# Patient Record
Sex: FEMALE | Race: WHITE | Employment: FULL TIME | URBAN - METROPOLITAN AREA
[De-identification: names, ages, dates, MRNs, and addresses within clinical notes are randomized per-mention and may not be internally consistent; named-entity substitution may affect disease eponyms.]

---

## 2017-03-20 ENCOUNTER — TELEPHONE (OUTPATIENT)
Dept: OBGYN CLINIC | Age: 37
End: 2017-03-20

## 2017-03-21 RX ORDER — VITAMIN A ACETATE, .BETA.-CAROTENE, ASCORBIC ACID, CHOLECALCIFEROL, .ALPHA.-TOCOPHEROL ACETATE, DL-, THIAMINE MONONITRATE, RIBOFLAVIN, NIACINAMIDE, PYRIDOXINE HYDROCHLORIDE, FOLIC ACID, CYANOCOBALAMIN, CALCIUM CARBONATE, FERROUS FUMARATE, ZINC OXIDE, AND CUPRIC OXIDE 2000; 2000; 120; 400; 22; 1.84; 3; 20; 10; 1; 12; 200; 27; 25; 2 [IU]/1; [IU]/1; MG/1; [IU]/1; MG/1; MG/1; MG/1; MG/1; MG/1; MG/1; UG/1; MG/1; MG/1; MG/1; MG/1
1 TABLET ORAL DAILY
Qty: 30 TABLET | Refills: 5 | Status: SHIPPED | OUTPATIENT
Start: 2017-03-21 | End: 2017-09-18 | Stop reason: SDUPTHER

## 2017-03-28 ENCOUNTER — OFFICE VISIT (OUTPATIENT)
Dept: OBGYN CLINIC | Age: 37
End: 2017-03-28
Payer: MEDICARE

## 2017-03-28 ENCOUNTER — HOSPITAL ENCOUNTER (OUTPATIENT)
Age: 37
Setting detail: SPECIMEN
Discharge: HOME | End: 2017-03-28
Payer: MEDICARE

## 2017-03-28 VITALS
BODY MASS INDEX: 41.12 KG/M2 | DIASTOLIC BLOOD PRESSURE: 70 MMHG | HEIGHT: 68 IN | WEIGHT: 271.3 LBS | SYSTOLIC BLOOD PRESSURE: 130 MMHG

## 2017-03-28 DIAGNOSIS — O09.529 AMA (ADVANCED MATERNAL AGE) MULTIGRAVIDA 35+, UNSPECIFIED TRIMESTER: ICD-10-CM

## 2017-03-28 DIAGNOSIS — N91.2 AMENORRHEA: ICD-10-CM

## 2017-03-28 DIAGNOSIS — Z01.419 VISIT FOR GYNECOLOGIC EXAMINATION: ICD-10-CM

## 2017-03-28 DIAGNOSIS — Z32.01 POSITIVE URINE PREGNANCY TEST: ICD-10-CM

## 2017-03-28 DIAGNOSIS — N91.2 AMENORRHEA: Primary | ICD-10-CM

## 2017-03-28 LAB
-: ABNORMAL
ABO/RH: NORMAL
ABSOLUTE EOS #: 0.2 K/UL (ref 0–0.4)
ABSOLUTE LYMPH #: 1.8 K/UL (ref 1–4.8)
ABSOLUTE MONO #: 0.7 K/UL (ref 0.1–1.2)
AMORPHOUS: ABNORMAL
ANTIBODY SCREEN: NEGATIVE
BACTERIA: ABNORMAL
BASOPHILS # BLD: 1 % (ref 0–2)
BASOPHILS ABSOLUTE: 0 K/UL (ref 0–0.2)
BILIRUBIN URINE: NEGATIVE
CASTS UA: ABNORMAL /LPF (ref 0–2)
CHLAMYDIA CULTURE: POSITIVE
COLOR: ABNORMAL
COMMENT UA: ABNORMAL
CONTROL: PRESENT
CRYSTALS, UA: ABNORMAL /HPF
CULTURE, GONORRHOEAE: NEGATIVE
DIFFERENTIAL TYPE: ABNORMAL
EOSINOPHILS RELATIVE PERCENT: 2 % (ref 1–4)
EPITHELIAL CELLS UA: ABNORMAL /HPF (ref 0–5)
GLUCOSE URINE: NEGATIVE
HCT VFR BLD CALC: 39.2 % (ref 36–46)
HEMOGLOBIN: 13.5 G/DL (ref 12–16)
HEPATITIS B SURFACE ANTIGEN: NONREACTIVE
HIV AG/AB: NONREACTIVE
KETONES, URINE: ABNORMAL
LEUKOCYTE ESTERASE, URINE: ABNORMAL
LYMPHOCYTES # BLD: 21 % (ref 24–44)
MCH RBC QN AUTO: 29.1 PG (ref 26–34)
MCHC RBC AUTO-ENTMCNC: 34.4 G/DL (ref 31–37)
MCV RBC AUTO: 84.5 FL (ref 80–100)
MONOCYTES # BLD: 9 % (ref 2–11)
MUCUS: ABNORMAL
NITRITE, URINE: NEGATIVE
OTHER OBSERVATIONS UA: ABNORMAL
PDW BLD-RTO: 13.8 % (ref 12.5–15.4)
PH UA: 5 (ref 5–8)
PLATELET # BLD: 387 K/UL (ref 140–450)
PLATELET ESTIMATE: ABNORMAL
PMV BLD AUTO: 7.3 FL (ref 6–12)
PREGNANCY TEST URINE, POC: POSITIVE
PROTEIN UA: NEGATIVE
RBC # BLD: 4.64 M/UL (ref 4–5.2)
RBC # BLD: ABNORMAL 10*6/UL
RBC UA: ABNORMAL /HPF (ref 0–2)
RENAL EPITHELIAL, UA: ABNORMAL /HPF
RUBV IGG SER QL: 28.5 IU/ML
SEG NEUTROPHILS: 67 % (ref 36–66)
SEGMENTED NEUTROPHILS ABSOLUTE COUNT: 5.8 K/UL (ref 1.8–7.7)
SPECIFIC GRAVITY UA: 1.03 (ref 1–1.03)
T. PALLIDUM, IGG: NONREACTIVE
TRICHOMONAS: ABNORMAL
TURBIDITY: ABNORMAL
URINE HGB: NEGATIVE
UROBILINOGEN, URINE: NORMAL
WBC # BLD: 8.6 K/UL (ref 3.5–11)
WBC # BLD: ABNORMAL 10*3/UL
WBC UA: ABNORMAL /HPF (ref 0–5)
YEAST: ABNORMAL

## 2017-03-28 PROCEDURE — 99213 OFFICE O/P EST LOW 20 MIN: CPT | Performed by: NURSE PRACTITIONER

## 2017-03-28 PROCEDURE — 81025 URINE PREGNANCY TEST: CPT | Performed by: NURSE PRACTITIONER

## 2017-03-28 RX ORDER — OSELTAMIVIR PHOSPHATE 75 MG/1
CAPSULE ORAL
COMMUNITY
Start: 2017-01-15 | End: 2017-04-06

## 2017-03-28 ASSESSMENT — ENCOUNTER SYMPTOMS
COUGH: 0
RHINORRHEA: 0
CONSTIPATION: 0
WHEEZING: 0
SHORTNESS OF BREATH: 0
ABDOMINAL PAIN: 0
BACK PAIN: 0
DIARRHEA: 0

## 2017-03-29 ENCOUNTER — TELEPHONE (OUTPATIENT)
Dept: OBGYN CLINIC | Age: 37
End: 2017-03-29

## 2017-03-29 DIAGNOSIS — A74.9 CHLAMYDIA: Primary | ICD-10-CM

## 2017-03-29 LAB
C TRACH DNA GENITAL QL NAA+PROBE: ABNORMAL
CULTURE: NORMAL
CULTURE: NORMAL
Lab: NORMAL
N. GONORRHOEAE DNA: NEGATIVE
SPECIMEN DESCRIPTION: NORMAL
STATUS: NORMAL

## 2017-03-29 RX ORDER — AZITHROMYCIN 250 MG/1
TABLET, FILM COATED ORAL
Qty: 4 TABLET | Refills: 0 | Status: SHIPPED | OUTPATIENT
Start: 2017-03-29 | End: 2017-04-06

## 2017-03-30 ENCOUNTER — TELEPHONE (OUTPATIENT)
Dept: OBGYN CLINIC | Age: 37
End: 2017-03-30

## 2017-03-30 LAB
ALCOHOL URINE: NEGATIVE MG/DL
AMPHETAMINE SCREEN URINE: NEGATIVE NG/ML
BARBITURATE SCREEN URINE: NEGATIVE NG/ML
BENZODIAZEPINE SCREEN, URINE: NEGATIVE NG/ML
CANNABINOID SCREEN URINE: NEGATIVE NG/ML
COCAINE(METAB.)SCREEN, URINE: NEGATIVE NG/ML
CREATININE URINE: 246.3 MG/DL (ref 20–400)
CYTOLOGY REPORT: NORMAL
Lab: NORMAL
METHADONE SCREEN, URINE: NEGATIVE NG/ML
OPIATES, URINE: NEGATIVE NG/ML
PHENCYCLIDINE, URINE: NEGATIVE NG/ML
PROPOXYPHENE, URINE: NEGATIVE NG/ML

## 2017-04-06 ENCOUNTER — INITIAL PRENATAL (OUTPATIENT)
Dept: OBGYN CLINIC | Age: 37
End: 2017-04-06
Payer: MEDICARE

## 2017-04-06 ENCOUNTER — PROCEDURE VISIT (OUTPATIENT)
Dept: OBGYN CLINIC | Age: 37
End: 2017-04-06
Payer: MEDICARE

## 2017-04-06 VITALS — WEIGHT: 268 LBS | SYSTOLIC BLOOD PRESSURE: 102 MMHG | BODY MASS INDEX: 41.36 KG/M2 | DIASTOLIC BLOOD PRESSURE: 78 MMHG

## 2017-04-06 DIAGNOSIS — E28.2 PCOS (POLYCYSTIC OVARIAN SYNDROME): ICD-10-CM

## 2017-04-06 DIAGNOSIS — O10.019 BENIGN ESSENTIAL HYPERTENSION ANTEPARTUM: ICD-10-CM

## 2017-04-06 DIAGNOSIS — Z36.89 ENCOUNTER TO ESTABLISH GESTATIONAL AGE USING ULTRASOUND: ICD-10-CM

## 2017-04-06 DIAGNOSIS — O10.919 CHRONIC HYPERTENSION IN PREGNANCY: Primary | ICD-10-CM

## 2017-04-06 DIAGNOSIS — A74.9 POSITIVE CHLAMYDIA PCR: ICD-10-CM

## 2017-04-06 DIAGNOSIS — O09.529 AMA (ADVANCED MATERNAL AGE) MULTIGRAVIDA 35+, UNSPECIFIED TRIMESTER: ICD-10-CM

## 2017-04-06 DIAGNOSIS — Z3A.08 8 WEEKS GESTATION OF PREGNANCY: Primary | ICD-10-CM

## 2017-04-06 DIAGNOSIS — Z87.59 HISTORY OF ECTOPIC PREGNANCY: ICD-10-CM

## 2017-04-06 LAB
ABDOMINAL CIRCUMFERENCE: NORMAL CM
BIPARIETAL DIAMETER: NORMAL CM
ESTIMATED FETAL WEIGHT: NORMAL GRAMS
FEMORAL DIAMETER: NORMAL CM
HC/AC: NORMAL
HEAD CIRCUMFERENCE: NORMAL CM

## 2017-04-06 PROCEDURE — 99213 OFFICE O/P EST LOW 20 MIN: CPT | Performed by: NURSE PRACTITIONER

## 2017-04-06 PROCEDURE — 76801 OB US < 14 WKS SINGLE FETUS: CPT | Performed by: OBSTETRICS & GYNECOLOGY

## 2017-04-06 PROCEDURE — 76817 TRANSVAGINAL US OBSTETRIC: CPT | Performed by: OBSTETRICS & GYNECOLOGY

## 2017-05-04 ENCOUNTER — HOSPITAL ENCOUNTER (OUTPATIENT)
Age: 37
Setting detail: SPECIMEN
Discharge: HOME | End: 2017-05-04
Payer: MEDICARE

## 2017-05-04 ENCOUNTER — ROUTINE PRENATAL (OUTPATIENT)
Dept: OBGYN CLINIC | Age: 37
End: 2017-05-04
Payer: MEDICARE

## 2017-05-04 VITALS — SYSTOLIC BLOOD PRESSURE: 112 MMHG | DIASTOLIC BLOOD PRESSURE: 80 MMHG | BODY MASS INDEX: 41.17 KG/M2 | WEIGHT: 266.8 LBS

## 2017-05-04 DIAGNOSIS — L29.9 ITCHING: ICD-10-CM

## 2017-05-04 DIAGNOSIS — O98.811 CHLAMYDIA INFECTION AFFECTING PREGNANCY IN FIRST TRIMESTER, ANTEPARTUM: ICD-10-CM

## 2017-05-04 DIAGNOSIS — Z3A.12 12 WEEKS GESTATION OF PREGNANCY: Primary | ICD-10-CM

## 2017-05-04 DIAGNOSIS — A74.9 CHLAMYDIA INFECTION AFFECTING PREGNANCY IN FIRST TRIMESTER, ANTEPARTUM: ICD-10-CM

## 2017-05-04 LAB
ALBUMIN SERPL-MCNC: 3.9 G/DL (ref 3.5–5.2)
ALBUMIN/GLOBULIN RATIO: 1.1 (ref 1–2.5)
ALP BLD-CCNC: 52 U/L (ref 35–104)
ALT SERPL-CCNC: 13 U/L (ref 5–33)
ANION GAP SERPL CALCULATED.3IONS-SCNC: 17 MMOL/L (ref 9–17)
AST SERPL-CCNC: 17 U/L
BILIRUB SERPL-MCNC: 0.39 MG/DL (ref 0.3–1.2)
BUN BLDV-MCNC: 6 MG/DL (ref 6–20)
BUN/CREAT BLD: ABNORMAL (ref 9–20)
CALCIUM SERPL-MCNC: 8.9 MG/DL (ref 8.6–10.4)
CHLORIDE BLD-SCNC: 100 MMOL/L (ref 98–107)
CO2: 21 MMOL/L (ref 20–31)
CREAT SERPL-MCNC: 0.39 MG/DL (ref 0.5–0.9)
GFR AFRICAN AMERICAN: >60 ML/MIN
GFR NON-AFRICAN AMERICAN: >60 ML/MIN
GFR SERPL CREATININE-BSD FRML MDRD: ABNORMAL ML/MIN/{1.73_M2}
GFR SERPL CREATININE-BSD FRML MDRD: ABNORMAL ML/MIN/{1.73_M2}
GLUCOSE BLD-MCNC: 84 MG/DL (ref 70–99)
POTASSIUM SERPL-SCNC: 4 MMOL/L (ref 3.7–5.3)
SODIUM BLD-SCNC: 138 MMOL/L (ref 135–144)
TOTAL PROTEIN: 7.4 G/DL (ref 6.4–8.3)

## 2017-05-04 PROCEDURE — 99213 OFFICE O/P EST LOW 20 MIN: CPT | Performed by: NURSE PRACTITIONER

## 2017-05-05 LAB
SEND OUT REPORT: NORMAL
TEST NAME: NORMAL

## 2017-05-08 DIAGNOSIS — A74.9 CHLAMYDIA INFECTION AFFECTING PREGNANCY IN FIRST TRIMESTER, ANTEPARTUM: ICD-10-CM

## 2017-05-08 DIAGNOSIS — Z3A.12 12 WEEKS GESTATION OF PREGNANCY: ICD-10-CM

## 2017-05-08 DIAGNOSIS — O98.811 CHLAMYDIA INFECTION AFFECTING PREGNANCY IN FIRST TRIMESTER, ANTEPARTUM: ICD-10-CM

## 2017-05-09 ENCOUNTER — ROUTINE PRENATAL (OUTPATIENT)
Dept: PERINATAL CARE | Age: 37
End: 2017-05-09
Payer: MEDICARE

## 2017-05-09 VITALS
HEART RATE: 95 BPM | RESPIRATION RATE: 18 BRPM | SYSTOLIC BLOOD PRESSURE: 113 MMHG | DIASTOLIC BLOOD PRESSURE: 66 MMHG | WEIGHT: 268 LBS | TEMPERATURE: 99.2 F | HEIGHT: 67 IN | BODY MASS INDEX: 42.06 KG/M2

## 2017-05-09 DIAGNOSIS — Z3A.13 13 WEEKS GESTATION OF PREGNANCY: ICD-10-CM

## 2017-05-09 DIAGNOSIS — O28.3 INCREASED NUCHAL TRANSLUCENCY SPACE ON FETAL ULTRASOUND: ICD-10-CM

## 2017-05-09 DIAGNOSIS — Z36.9 FIRST TRIMESTER SCREENING: ICD-10-CM

## 2017-05-09 DIAGNOSIS — O09.521 AMA (ADVANCED MATERNAL AGE) MULTIGRAVIDA 35+, FIRST TRIMESTER: Primary | ICD-10-CM

## 2017-05-09 DIAGNOSIS — O99.211 OBESITY COMPLICATING PREGNANCY, FIRST TRIMESTER: ICD-10-CM

## 2017-05-09 DIAGNOSIS — O36.80X0 EXAMINATION TO DETERMINE FETAL VIABILITY OF PREGNANCY, NOT APPLICABLE OR UNSPECIFIED FETUS: ICD-10-CM

## 2017-05-09 PROBLEM — O99.210 OBESITY COMPLICATING PREGNANCY: Status: ACTIVE | Noted: 2017-05-09

## 2017-05-09 PROBLEM — O09.529 AMA (ADVANCED MATERNAL AGE) MULTIGRAVIDA 35+: Status: ACTIVE | Noted: 2017-05-09

## 2017-05-09 PROCEDURE — 76801 OB US < 14 WKS SINGLE FETUS: CPT | Performed by: OBSTETRICS & GYNECOLOGY

## 2017-05-09 PROCEDURE — 76813 OB US NUCHAL MEAS 1 GEST: CPT | Performed by: OBSTETRICS & GYNECOLOGY

## 2017-05-09 PROCEDURE — 99242 OFF/OP CONSLTJ NEW/EST SF 20: CPT | Performed by: OBSTETRICS & GYNECOLOGY

## 2017-05-15 ENCOUNTER — HOSPITAL ENCOUNTER (OUTPATIENT)
Age: 37
Setting detail: SPECIMEN
Discharge: HOME | End: 2017-05-15
Payer: MEDICARE

## 2017-05-15 LAB
CREAT SERPL-MCNC: 0.38 MG/DL (ref 0.5–0.9)
CREATININE CLEARANCE: 262.6 ML/MIN/BSA (ref 71–151)
CREATININE TIMED UR: ABNORMAL MG/ X H
CREATININE URINE: 240.6 MG/DL
CREATININE URINE: 240.6 MG/DL (ref 28–217)
CREATININE, 24H UR: 1910 MG/24 H (ref 740–1570)
HOURS COLLECTED: 24 H
HOURS COLLECTED: 24 H
LENGTH OF COLLECTION: 24 H
PATIENT HEIGHT: 171 CM
PATIENT WEIGHT: 122 KG
PROTEIN 24 HOUR URINE: 119 MG/24 H
PROTEIN,TOT TIMED UR: NORMAL MG/X H
URINE TOTAL PROTEIN: 15 MG/DL
VOLUME: 794 ML

## 2017-05-16 ENCOUNTER — HOSPITAL ENCOUNTER (OUTPATIENT)
Age: 37
Discharge: HOME | End: 2017-05-16
Payer: MEDICARE

## 2017-05-16 PROCEDURE — 36415 COLL VENOUS BLD VENIPUNCTURE: CPT

## 2017-05-17 LAB
SEND OUT REPORT: NORMAL
TEST NAME: NORMAL

## 2017-05-18 ENCOUNTER — ROUTINE PRENATAL (OUTPATIENT)
Dept: OBGYN CLINIC | Age: 37
End: 2017-05-18
Payer: MEDICARE

## 2017-05-18 VITALS — SYSTOLIC BLOOD PRESSURE: 118 MMHG | DIASTOLIC BLOOD PRESSURE: 82 MMHG | BODY MASS INDEX: 41.57 KG/M2 | WEIGHT: 265.4 LBS

## 2017-05-18 DIAGNOSIS — N76.0 ACUTE VAGINITIS: Primary | ICD-10-CM

## 2017-05-18 PROCEDURE — 99213 OFFICE O/P EST LOW 20 MIN: CPT | Performed by: NURSE PRACTITIONER

## 2017-05-19 DIAGNOSIS — N76.0 ACUTE VAGINITIS: ICD-10-CM

## 2017-05-19 RX ORDER — METRONIDAZOLE 7.5 MG/G
GEL VAGINAL
Qty: 1 TUBE | Refills: 0 | Status: SHIPPED | OUTPATIENT
Start: 2017-05-19 | End: 2017-05-19 | Stop reason: CLARIF

## 2017-05-23 ENCOUNTER — TELEPHONE (OUTPATIENT)
Dept: PERINATAL CARE | Age: 37
End: 2017-05-23

## 2017-06-01 ENCOUNTER — ROUTINE PRENATAL (OUTPATIENT)
Dept: OBGYN CLINIC | Age: 37
End: 2017-06-01
Payer: MEDICARE

## 2017-06-01 VITALS — DIASTOLIC BLOOD PRESSURE: 72 MMHG | WEIGHT: 266.8 LBS | BODY MASS INDEX: 41.79 KG/M2 | SYSTOLIC BLOOD PRESSURE: 100 MMHG

## 2017-06-01 DIAGNOSIS — Z3A.16 16 WEEKS GESTATION OF PREGNANCY: Primary | ICD-10-CM

## 2017-06-01 PROCEDURE — 99213 OFFICE O/P EST LOW 20 MIN: CPT | Performed by: NURSE PRACTITIONER

## 2017-06-08 ENCOUNTER — ROUTINE PRENATAL (OUTPATIENT)
Dept: PERINATAL CARE | Age: 37
End: 2017-06-08
Payer: MEDICARE

## 2017-06-08 VITALS
DIASTOLIC BLOOD PRESSURE: 65 MMHG | TEMPERATURE: 98.4 F | RESPIRATION RATE: 18 BRPM | WEIGHT: 266 LBS | SYSTOLIC BLOOD PRESSURE: 110 MMHG | BODY MASS INDEX: 41.66 KG/M2 | HEART RATE: 90 BPM

## 2017-06-08 DIAGNOSIS — O09.522 AMA (ADVANCED MATERNAL AGE) MULTIGRAVIDA 35+, SECOND TRIMESTER: ICD-10-CM

## 2017-06-08 DIAGNOSIS — Z3A.17 17 WEEKS GESTATION OF PREGNANCY: ICD-10-CM

## 2017-06-08 DIAGNOSIS — O28.3 INCREASED NUCHAL TRANSLUCENCY SPACE ON FETAL ULTRASOUND: ICD-10-CM

## 2017-06-08 DIAGNOSIS — O28.9 ABNORMAL FIRST TRIMESTER SCREEN: Primary | ICD-10-CM

## 2017-06-08 PROCEDURE — 99213 OFFICE O/P EST LOW 20 MIN: CPT | Performed by: OBSTETRICS & GYNECOLOGY

## 2017-06-27 ENCOUNTER — ROUTINE PRENATAL (OUTPATIENT)
Dept: PERINATAL CARE | Age: 37
End: 2017-06-27
Payer: MEDICARE

## 2017-06-27 VITALS
TEMPERATURE: 98.6 F | DIASTOLIC BLOOD PRESSURE: 74 MMHG | WEIGHT: 265 LBS | SYSTOLIC BLOOD PRESSURE: 116 MMHG | BODY MASS INDEX: 41.5 KG/M2 | RESPIRATION RATE: 20 BRPM | HEART RATE: 84 BPM

## 2017-06-27 DIAGNOSIS — O28.9 ABNORMAL FIRST TRIMESTER SCREEN: ICD-10-CM

## 2017-06-27 DIAGNOSIS — O28.3 INCREASED NUCHAL TRANSLUCENCY SPACE ON FETAL ULTRASOUND: ICD-10-CM

## 2017-06-27 DIAGNOSIS — Z3A.20 20 WEEKS GESTATION OF PREGNANCY: ICD-10-CM

## 2017-06-27 DIAGNOSIS — O09.522 AMA (ADVANCED MATERNAL AGE) MULTIGRAVIDA 35+, SECOND TRIMESTER: Primary | ICD-10-CM

## 2017-06-27 DIAGNOSIS — O10.919 CHRONIC HYPERTENSION IN PREGNANCY: ICD-10-CM

## 2017-06-27 DIAGNOSIS — Z36.86 ENCOUNTER FOR SCREENING FOR RISK OF PRE-TERM LABOR: ICD-10-CM

## 2017-06-27 DIAGNOSIS — O99.212 OBESITY COMPLICATING PREGNANCY, SECOND TRIMESTER: ICD-10-CM

## 2017-06-27 PROCEDURE — 76811 OB US DETAILED SNGL FETUS: CPT | Performed by: OBSTETRICS & GYNECOLOGY

## 2017-06-27 PROCEDURE — 76817 TRANSVAGINAL US OBSTETRIC: CPT | Performed by: OBSTETRICS & GYNECOLOGY

## 2017-07-11 ENCOUNTER — HOSPITAL ENCOUNTER (OUTPATIENT)
Age: 37
Setting detail: SPECIMEN
Discharge: HOME | End: 2017-07-11
Payer: MEDICARE

## 2017-07-11 ENCOUNTER — ROUTINE PRENATAL (OUTPATIENT)
Dept: OBGYN CLINIC | Age: 37
End: 2017-07-11
Payer: MEDICARE

## 2017-07-11 VITALS
HEART RATE: 86 BPM | DIASTOLIC BLOOD PRESSURE: 82 MMHG | BODY MASS INDEX: 41.97 KG/M2 | WEIGHT: 268 LBS | SYSTOLIC BLOOD PRESSURE: 114 MMHG

## 2017-07-11 DIAGNOSIS — O09.529 AMA (ADVANCED MATERNAL AGE) MULTIGRAVIDA 35+, UNSPECIFIED TRIMESTER: ICD-10-CM

## 2017-07-11 DIAGNOSIS — O09.92 ENCOUNTER FOR SUPERVISION OF HIGH RISK PREGNANCY IN SECOND TRIMESTER, ANTEPARTUM: ICD-10-CM

## 2017-07-11 DIAGNOSIS — Z3A.22 22 WEEKS GESTATION OF PREGNANCY: Primary | ICD-10-CM

## 2017-07-11 PROCEDURE — 99213 OFFICE O/P EST LOW 20 MIN: CPT | Performed by: NURSE PRACTITIONER

## 2017-07-14 LAB
AFP INTERPRETATION: NORMAL
AFP MOM: 1.25
AFP SPECIMEN: NORMAL
AFP: 66 NG/ML
DATE OF BIRTH: NORMAL
DATING METHOD: NORMAL
DETERMINED BY: NORMAL
DIABETIC: NO
DUE DATE: NORMAL
ESTIMATED DUE DATE: NORMAL
FAMILY HISTORY NTD: NO
GESTATIONAL AGE: 22 WEEKS
HISTORY OF ANEUPLOIDY?: NO
INSULIN REQ DIABETES: NO
LAST MENSTRUAL PERIOD: NORMAL
MATERNAL AGE AT EDD: 36.6 YR
MATERNAL WEIGHT: 265
NUMBER OF FETUSES: NORMAL
PATIENT WEIGHT: 265 LBS
PHYSICIAN: NORMAL
RACE (MATERNAL): NORMAL
RACE: NORMAL
ZZ NTE CLEAN UP: HISTORY: NO

## 2017-07-25 ENCOUNTER — ROUTINE PRENATAL (OUTPATIENT)
Dept: PERINATAL CARE | Age: 37
End: 2017-07-25
Payer: MEDICARE

## 2017-07-25 VITALS
DIASTOLIC BLOOD PRESSURE: 77 MMHG | TEMPERATURE: 97.4 F | RESPIRATION RATE: 18 BRPM | SYSTOLIC BLOOD PRESSURE: 128 MMHG | HEART RATE: 98 BPM | WEIGHT: 269 LBS | BODY MASS INDEX: 42.22 KG/M2 | HEIGHT: 67 IN

## 2017-07-25 DIAGNOSIS — Z36.4 ANTENATAL SCREENING FOR FETAL GROWTH RETARDATION USING ULTRASONICS: ICD-10-CM

## 2017-07-25 DIAGNOSIS — O10.919 CHRONIC HYPERTENSION IN PREGNANCY: ICD-10-CM

## 2017-07-25 DIAGNOSIS — O28.9 ABNORMAL FIRST TRIMESTER SCREEN: ICD-10-CM

## 2017-07-25 DIAGNOSIS — Z3A.24 24 WEEKS GESTATION OF PREGNANCY: ICD-10-CM

## 2017-07-25 DIAGNOSIS — O28.3 INCREASED NUCHAL TRANSLUCENCY SPACE ON FETAL ULTRASOUND: Primary | ICD-10-CM

## 2017-07-25 DIAGNOSIS — O99.212 OBESITY COMPLICATING PREGNANCY, SECOND TRIMESTER: ICD-10-CM

## 2017-07-25 DIAGNOSIS — O09.522 AMA (ADVANCED MATERNAL AGE) MULTIGRAVIDA 35+, SECOND TRIMESTER: ICD-10-CM

## 2017-07-25 PROCEDURE — 76816 OB US FOLLOW-UP PER FETUS: CPT | Performed by: OBSTETRICS & GYNECOLOGY

## 2017-07-25 PROCEDURE — 76827 ECHO EXAM OF FETAL HEART: CPT | Performed by: OBSTETRICS & GYNECOLOGY

## 2017-07-25 PROCEDURE — 76820 UMBILICAL ARTERY ECHO: CPT | Performed by: OBSTETRICS & GYNECOLOGY

## 2017-07-25 PROCEDURE — 93325 DOPPLER ECHO COLOR FLOW MAPG: CPT | Performed by: OBSTETRICS & GYNECOLOGY

## 2017-07-25 PROCEDURE — 76825 ECHO EXAM OF FETAL HEART: CPT | Performed by: OBSTETRICS & GYNECOLOGY

## 2017-08-08 ENCOUNTER — ROUTINE PRENATAL (OUTPATIENT)
Dept: OBGYN CLINIC | Age: 37
End: 2017-08-08
Payer: MEDICARE

## 2017-08-08 VITALS
SYSTOLIC BLOOD PRESSURE: 116 MMHG | HEART RATE: 90 BPM | BODY MASS INDEX: 42.51 KG/M2 | DIASTOLIC BLOOD PRESSURE: 84 MMHG | WEIGHT: 271.4 LBS

## 2017-08-08 DIAGNOSIS — O09.92 ENCOUNTER FOR SUPERVISION OF HIGH RISK PREGNANCY IN SECOND TRIMESTER, ANTEPARTUM: ICD-10-CM

## 2017-08-08 DIAGNOSIS — Z3A.26 26 WEEKS GESTATION OF PREGNANCY: Primary | ICD-10-CM

## 2017-08-08 DIAGNOSIS — O09.529 AMA (ADVANCED MATERNAL AGE) MULTIGRAVIDA 35+, UNSPECIFIED TRIMESTER: ICD-10-CM

## 2017-08-08 PROCEDURE — 99213 OFFICE O/P EST LOW 20 MIN: CPT | Performed by: NURSE PRACTITIONER

## 2017-08-18 ENCOUNTER — HOSPITAL ENCOUNTER (OUTPATIENT)
Age: 37
Setting detail: SPECIMEN
Discharge: HOME | End: 2017-08-18
Payer: MEDICARE

## 2017-08-18 DIAGNOSIS — Z3A.26 26 WEEKS GESTATION OF PREGNANCY: ICD-10-CM

## 2017-08-18 LAB
ABSOLUTE EOS #: 0.3 K/UL (ref 0–0.4)
ABSOLUTE LYMPH #: 1.6 K/UL (ref 1–4.8)
ABSOLUTE MONO #: 0.7 K/UL (ref 0.1–1.2)
BASOPHILS # BLD: 0 %
BASOPHILS ABSOLUTE: 0 K/UL (ref 0–0.2)
DIFFERENTIAL TYPE: ABNORMAL
EOSINOPHILS RELATIVE PERCENT: 3 %
GLUCOSE ADMINISTRATION: NORMAL
GLUCOSE TOLERANCE SCREEN 50G: 133 MG/DL (ref 70–135)
HCT VFR BLD CALC: 35.7 % (ref 36–46)
HEMOGLOBIN: 12.4 G/DL (ref 12–16)
LYMPHOCYTES # BLD: 15 %
MCH RBC QN AUTO: 30.9 PG (ref 26–34)
MCHC RBC AUTO-ENTMCNC: 34.7 G/DL (ref 31–37)
MCV RBC AUTO: 89.1 FL (ref 80–100)
MONOCYTES # BLD: 7 %
PDW BLD-RTO: 13.9 % (ref 12.5–15.4)
PLATELET # BLD: 330 K/UL (ref 140–450)
PLATELET ESTIMATE: ABNORMAL
PMV BLD AUTO: 7.9 FL (ref 6–12)
RBC # BLD: 4 M/UL (ref 4–5.2)
RBC # BLD: ABNORMAL 10*6/UL
SEG NEUTROPHILS: 75 %
SEGMENTED NEUTROPHILS ABSOLUTE COUNT: 8.1 K/UL (ref 1.8–7.7)
WBC # BLD: 10.7 K/UL (ref 3.5–11)
WBC # BLD: ABNORMAL 10*3/UL

## 2017-08-22 ENCOUNTER — ROUTINE PRENATAL (OUTPATIENT)
Dept: PERINATAL CARE | Age: 37
End: 2017-08-22
Payer: MEDICARE

## 2017-08-22 VITALS
DIASTOLIC BLOOD PRESSURE: 68 MMHG | BODY MASS INDEX: 41.97 KG/M2 | TEMPERATURE: 98.4 F | WEIGHT: 268 LBS | SYSTOLIC BLOOD PRESSURE: 112 MMHG | HEART RATE: 92 BPM | RESPIRATION RATE: 18 BRPM

## 2017-08-22 DIAGNOSIS — Z36.4 ANTENATAL SCREENING FOR FETAL GROWTH RETARDATION USING ULTRASONICS: ICD-10-CM

## 2017-08-22 DIAGNOSIS — O99.213 OBESITY COMPLICATING PREGNANCY, THIRD TRIMESTER: ICD-10-CM

## 2017-08-22 DIAGNOSIS — O28.9 ABNORMAL FIRST TRIMESTER SCREEN: ICD-10-CM

## 2017-08-22 DIAGNOSIS — Z3A.28 28 WEEKS GESTATION OF PREGNANCY: ICD-10-CM

## 2017-08-22 DIAGNOSIS — O10.019 BENIGN ESSENTIAL HYPERTENSION, ANTEPARTUM: Primary | ICD-10-CM

## 2017-08-22 DIAGNOSIS — Z13.89 ENCOUNTER FOR ROUTINE SCREENING FOR MALFORMATION USING ULTRASONICS: ICD-10-CM

## 2017-08-22 DIAGNOSIS — O28.3 INCREASED NUCHAL TRANSLUCENCY SPACE ON FETAL ULTRASOUND: ICD-10-CM

## 2017-08-22 DIAGNOSIS — O09.523 AMA (ADVANCED MATERNAL AGE) MULTIGRAVIDA 35+, THIRD TRIMESTER: ICD-10-CM

## 2017-08-22 PROCEDURE — 76805 OB US >/= 14 WKS SNGL FETUS: CPT | Performed by: OBSTETRICS & GYNECOLOGY

## 2017-08-22 PROCEDURE — 76820 UMBILICAL ARTERY ECHO: CPT | Performed by: OBSTETRICS & GYNECOLOGY

## 2017-08-22 PROCEDURE — 76819 FETAL BIOPHYS PROFIL W/O NST: CPT | Performed by: OBSTETRICS & GYNECOLOGY

## 2017-08-29 ENCOUNTER — ROUTINE PRENATAL (OUTPATIENT)
Dept: OBGYN CLINIC | Age: 37
End: 2017-08-29
Payer: MEDICARE

## 2017-08-29 VITALS — WEIGHT: 267.8 LBS | DIASTOLIC BLOOD PRESSURE: 80 MMHG | BODY MASS INDEX: 41.94 KG/M2 | SYSTOLIC BLOOD PRESSURE: 118 MMHG

## 2017-08-29 DIAGNOSIS — O09.523 AMA (ADVANCED MATERNAL AGE) MULTIGRAVIDA 35+, THIRD TRIMESTER: ICD-10-CM

## 2017-08-29 DIAGNOSIS — E28.2 PCOS (POLYCYSTIC OVARIAN SYNDROME): ICD-10-CM

## 2017-08-29 DIAGNOSIS — O28.3 INCREASED NUCHAL TRANSLUCENCY SPACE ON FETAL ULTRASOUND: ICD-10-CM

## 2017-08-29 DIAGNOSIS — O10.019 BENIGN ESSENTIAL HYPERTENSION, ANTEPARTUM: ICD-10-CM

## 2017-08-29 DIAGNOSIS — Z3A.29 29 WEEKS GESTATION OF PREGNANCY: Primary | ICD-10-CM

## 2017-08-29 PROCEDURE — 99213 OFFICE O/P EST LOW 20 MIN: CPT | Performed by: OBSTETRICS & GYNECOLOGY

## 2017-08-29 RX ORDER — AMOXICILLIN AND CLAVULANATE POTASSIUM 600; 42.9 MG/5ML; MG/5ML
125 POWDER, FOR SUSPENSION ORAL DAILY
COMMUNITY
End: 2017-10-02 | Stop reason: ALTCHOICE

## 2017-09-11 ENCOUNTER — HOSPITAL ENCOUNTER (OUTPATIENT)
Age: 37
Discharge: HOME | End: 2017-09-11
Attending: OBSTETRICS & GYNECOLOGY | Admitting: OBSTETRICS & GYNECOLOGY
Payer: MEDICARE

## 2017-09-11 ENCOUNTER — TELEPHONE (OUTPATIENT)
Dept: OBGYN CLINIC | Age: 37
End: 2017-09-11

## 2017-09-11 VITALS
RESPIRATION RATE: 18 BRPM | HEART RATE: 89 BPM | SYSTOLIC BLOOD PRESSURE: 125 MMHG | DIASTOLIC BLOOD PRESSURE: 67 MMHG | TEMPERATURE: 98.3 F

## 2017-09-11 PROBLEM — N93.9 VAGINAL BLEEDING: Status: ACTIVE | Noted: 2017-09-11

## 2017-09-11 LAB
-: ABNORMAL
AMORPHOUS: ABNORMAL
BACTERIA: ABNORMAL
BILIRUBIN URINE: NEGATIVE
CASTS UA: ABNORMAL /LPF (ref 0–2)
COLOR: ABNORMAL
COMMENT UA: ABNORMAL
CRYSTALS, UA: ABNORMAL /HPF
DIRECT EXAM: NORMAL
EPITHELIAL CELLS UA: ABNORMAL /HPF (ref 0–5)
GLUCOSE URINE: NEGATIVE
KETONES, URINE: NEGATIVE
LEUKOCYTE ESTERASE, URINE: NEGATIVE
Lab: NORMAL
MUCUS: ABNORMAL
NITRITE, URINE: NEGATIVE
OTHER OBSERVATIONS UA: ABNORMAL
PH UA: 5 (ref 5–8)
PROTEIN UA: ABNORMAL
RBC UA: ABNORMAL /HPF (ref 0–2)
RENAL EPITHELIAL, UA: ABNORMAL /HPF
SPECIFIC GRAVITY UA: 1.03 (ref 1–1.03)
SPECIMEN DESCRIPTION: NORMAL
STATUS: NORMAL
TRICHOMONAS: ABNORMAL
TURBIDITY: ABNORMAL
URINE HGB: ABNORMAL
UROBILINOGEN, URINE: NORMAL
WBC UA: ABNORMAL /HPF (ref 0–5)
YEAST: ABNORMAL

## 2017-09-11 PROCEDURE — 87660 TRICHOMONAS VAGIN DIR PROBE: CPT

## 2017-09-11 PROCEDURE — 87480 CANDIDA DNA DIR PROBE: CPT

## 2017-09-11 PROCEDURE — 87510 GARDNER VAG DNA DIR PROBE: CPT

## 2017-09-11 PROCEDURE — 99213 OFFICE O/P EST LOW 20 MIN: CPT

## 2017-09-11 PROCEDURE — 87591 N.GONORRHOEAE DNA AMP PROB: CPT

## 2017-09-11 PROCEDURE — 87491 CHLMYD TRACH DNA AMP PROBE: CPT

## 2017-09-11 PROCEDURE — 81001 URINALYSIS AUTO W/SCOPE: CPT

## 2017-09-12 LAB
C TRACH DNA GENITAL QL NAA+PROBE: NEGATIVE
N. GONORRHOEAE DNA: NEGATIVE

## 2017-09-18 ENCOUNTER — TELEPHONE (OUTPATIENT)
Dept: OBGYN CLINIC | Age: 37
End: 2017-09-18

## 2017-09-18 RX ORDER — VITAMIN A ACETATE, .BETA.-CAROTENE, ASCORBIC ACID, CHOLECALCIFEROL, .ALPHA.-TOCOPHEROL ACETATE, DL-, THIAMINE MONONITRATE, RIBOFLAVIN, NIACINAMIDE, PYRIDOXINE HYDROCHLORIDE, FOLIC ACID, CYANOCOBALAMIN, CALCIUM CARBONATE, FERROUS FUMARATE, ZINC OXIDE, AND CUPRIC OXIDE 2000; 2000; 120; 400; 22; 1.84; 3; 20; 10; 1; 12; 200; 27; 25; 2 [IU]/1; [IU]/1; MG/1; [IU]/1; MG/1; MG/1; MG/1; MG/1; MG/1; MG/1; UG/1; MG/1; MG/1; MG/1; MG/1
1 TABLET ORAL DAILY
Qty: 30 TABLET | Refills: 12 | Status: SHIPPED | OUTPATIENT
Start: 2017-09-18 | End: 2018-09-18 | Stop reason: SDUPTHER

## 2017-09-19 ENCOUNTER — ROUTINE PRENATAL (OUTPATIENT)
Dept: PERINATAL CARE | Age: 37
End: 2017-09-19
Payer: MEDICARE

## 2017-09-19 VITALS
HEART RATE: 86 BPM | RESPIRATION RATE: 18 BRPM | WEIGHT: 266 LBS | TEMPERATURE: 98.1 F | SYSTOLIC BLOOD PRESSURE: 98 MMHG | DIASTOLIC BLOOD PRESSURE: 63 MMHG | BODY MASS INDEX: 41.66 KG/M2

## 2017-09-19 DIAGNOSIS — O09.523 AMA (ADVANCED MATERNAL AGE) MULTIGRAVIDA 35+, THIRD TRIMESTER: ICD-10-CM

## 2017-09-19 DIAGNOSIS — O99.213 OBESITY COMPLICATING PREGNANCY, THIRD TRIMESTER: ICD-10-CM

## 2017-09-19 DIAGNOSIS — O28.9 ABNORMAL FIRST TRIMESTER SCREEN: ICD-10-CM

## 2017-09-19 DIAGNOSIS — Z36.4 ANTENATAL SCREENING FOR FETAL GROWTH RETARDATION USING ULTRASONICS: ICD-10-CM

## 2017-09-19 DIAGNOSIS — Z3A.32 32 WEEKS GESTATION OF PREGNANCY: ICD-10-CM

## 2017-09-19 DIAGNOSIS — O28.3 INCREASED NUCHAL TRANSLUCENCY SPACE ON FETAL ULTRASOUND: Primary | ICD-10-CM

## 2017-09-19 DIAGNOSIS — O10.919 CHRONIC HYPERTENSION IN PREGNANCY: ICD-10-CM

## 2017-09-19 PROCEDURE — 76820 UMBILICAL ARTERY ECHO: CPT | Performed by: OBSTETRICS & GYNECOLOGY

## 2017-09-19 PROCEDURE — 76816 OB US FOLLOW-UP PER FETUS: CPT | Performed by: OBSTETRICS & GYNECOLOGY

## 2017-09-19 PROCEDURE — 76818 FETAL BIOPHYS PROFILE W/NST: CPT | Performed by: OBSTETRICS & GYNECOLOGY

## 2017-09-26 ENCOUNTER — ROUTINE PRENATAL (OUTPATIENT)
Dept: OBGYN CLINIC | Age: 37
End: 2017-09-26
Payer: MEDICARE

## 2017-09-26 ENCOUNTER — TELEPHONE (OUTPATIENT)
Dept: OBGYN CLINIC | Age: 37
End: 2017-09-26

## 2017-09-26 VITALS — BODY MASS INDEX: 41.16 KG/M2 | DIASTOLIC BLOOD PRESSURE: 64 MMHG | SYSTOLIC BLOOD PRESSURE: 120 MMHG | WEIGHT: 262.8 LBS

## 2017-09-26 DIAGNOSIS — O10.919 CHRONIC HYPERTENSION IN PREGNANCY: ICD-10-CM

## 2017-09-26 DIAGNOSIS — Z87.59 HISTORY OF ECTOPIC PREGNANCY: ICD-10-CM

## 2017-09-26 DIAGNOSIS — Z3A.33 33 WEEKS GESTATION OF PREGNANCY: ICD-10-CM

## 2017-09-26 DIAGNOSIS — O28.9 ABNORMAL FIRST TRIMESTER SCREEN: ICD-10-CM

## 2017-09-26 DIAGNOSIS — O09.93 HIGH-RISK PREGNANCY, THIRD TRIMESTER: Primary | ICD-10-CM

## 2017-09-26 DIAGNOSIS — O09.523 AMA (ADVANCED MATERNAL AGE) MULTIGRAVIDA 35+, THIRD TRIMESTER: ICD-10-CM

## 2017-09-26 PROBLEM — O09.90 HIGH-RISK PREGNANCY: Status: ACTIVE | Noted: 2017-09-26

## 2017-09-26 PROCEDURE — 99213 OFFICE O/P EST LOW 20 MIN: CPT | Performed by: NURSE PRACTITIONER

## 2017-09-26 PROCEDURE — 59025 FETAL NON-STRESS TEST: CPT | Performed by: NURSE PRACTITIONER

## 2017-09-26 RX ORDER — AZITHROMYCIN 250 MG/1
TABLET, FILM COATED ORAL
COMMUNITY
Start: 2017-07-20 | End: 2017-10-02 | Stop reason: ALTCHOICE

## 2017-09-26 RX ORDER — AMOXICILLIN AND CLAVULANATE POTASSIUM 875; 125 MG/1; MG/1
TABLET, FILM COATED ORAL
COMMUNITY
Start: 2017-08-26 | End: 2017-10-02 | Stop reason: ALTCHOICE

## 2017-09-26 NOTE — TELEPHONE ENCOUNTER
Patient calls back and scheduled  testing thru 10/20/17.  bpp /nst at [facility]  L&D 5 pm on 10/05/17 and 10/12/17

## 2017-09-26 NOTE — TELEPHONE ENCOUNTER
patient here for NST , also scheduled her for BPP/NST at [address] L&D 17 at 5 pm. She wants to call [facility] today and ask if she can just do testing once a week as she works, has 2 other children.  She will call after she speaks with DARREN and perhaps schedule more ante erendira testing

## 2017-09-29 ENCOUNTER — HOSPITAL ENCOUNTER (OUTPATIENT)
Age: 37
Discharge: HOME | End: 2017-09-29
Attending: OBSTETRICS & GYNECOLOGY | Admitting: OBSTETRICS & GYNECOLOGY
Payer: MEDICARE

## 2017-09-29 VITALS
SYSTOLIC BLOOD PRESSURE: 132 MMHG | TEMPERATURE: 98.1 F | HEART RATE: 94 BPM | DIASTOLIC BLOOD PRESSURE: 78 MMHG | RESPIRATION RATE: 20 BRPM

## 2017-09-29 PROCEDURE — 59025 FETAL NON-STRESS TEST: CPT

## 2017-09-29 PROCEDURE — 99213 OFFICE O/P EST LOW 20 MIN: CPT

## 2017-09-29 RX ORDER — ONDANSETRON 2 MG/ML
4 INJECTION INTRAMUSCULAR; INTRAVENOUS EVERY 6 HOURS PRN
Status: DISCONTINUED | OUTPATIENT
Start: 2017-09-29 | End: 2017-09-29 | Stop reason: HOSPADM

## 2017-09-29 RX ORDER — ACETAMINOPHEN 500 MG
1000 TABLET ORAL EVERY 6 HOURS PRN
Status: DISCONTINUED | OUTPATIENT
Start: 2017-09-29 | End: 2017-09-29 | Stop reason: HOSPADM

## 2017-10-02 ENCOUNTER — ROUTINE PRENATAL (OUTPATIENT)
Dept: OBGYN CLINIC | Age: 37
End: 2017-10-02
Payer: MEDICARE

## 2017-10-02 VITALS — SYSTOLIC BLOOD PRESSURE: 108 MMHG | WEIGHT: 267.8 LBS | BODY MASS INDEX: 41.94 KG/M2 | DIASTOLIC BLOOD PRESSURE: 84 MMHG

## 2017-10-02 DIAGNOSIS — O10.919 CHRONIC HYPERTENSION IN PREGNANCY: ICD-10-CM

## 2017-10-02 DIAGNOSIS — Z3A.33 33 WEEKS GESTATION OF PREGNANCY: ICD-10-CM

## 2017-10-02 DIAGNOSIS — O43.93 ABNORMAL PLACENTA AFFECTING MANAGEMENT OF MOTHER IN THIRD TRIMESTER: ICD-10-CM

## 2017-10-02 DIAGNOSIS — O09.523 AMA (ADVANCED MATERNAL AGE) MULTIGRAVIDA 35+, THIRD TRIMESTER: ICD-10-CM

## 2017-10-02 DIAGNOSIS — O28.9 ABNORMAL FIRST TRIMESTER SCREEN: Primary | ICD-10-CM

## 2017-10-02 PROCEDURE — 59025 FETAL NON-STRESS TEST: CPT | Performed by: OBSTETRICS & GYNECOLOGY

## 2017-10-02 PROCEDURE — 99213 OFFICE O/P EST LOW 20 MIN: CPT | Performed by: OBSTETRICS & GYNECOLOGY

## 2017-10-06 ENCOUNTER — HOSPITAL ENCOUNTER (OUTPATIENT)
Age: 37
Discharge: HOME | End: 2017-10-06
Attending: OBSTETRICS & GYNECOLOGY | Admitting: OBSTETRICS & GYNECOLOGY
Payer: MEDICARE

## 2017-10-06 VITALS — HEART RATE: 103 BPM | SYSTOLIC BLOOD PRESSURE: 139 MMHG | TEMPERATURE: 98.2 F | DIASTOLIC BLOOD PRESSURE: 79 MMHG

## 2017-10-06 PROCEDURE — 76818 FETAL BIOPHYS PROFILE W/NST: CPT | Performed by: OBSTETRICS & GYNECOLOGY

## 2017-10-06 PROCEDURE — 76818 FETAL BIOPHYS PROFILE W/NST: CPT

## 2017-10-06 PROCEDURE — 99213 OFFICE O/P EST LOW 20 MIN: CPT

## 2017-10-06 NOTE — PROGRESS NOTES
TESTING NOTE    Eri Mclaughlin is a 40 y.o. female [id number] at 34w3d    The patient was seen and examined. She is here today for  testing for because she is at high risk for the following reasons: cHTN, AMA, increased risk for down syndrome on FTS and increased NT. The baby is moving well and she denies any complaints. Patient Active Problem List   Diagnosis    Hypertension    PCOS (polycystic ovarian syndrome)    History of ectopic pregnancy    Abnormal first trimester screen    Chronic hypertension in pregnancy    AMA (advanced maternal age) multigravida 35+    Increased nuchal translucency space on fetal ultrasound    Obesity complicating pregnancy    Vaginal bleeding    High-risk pregnancy    Abnormal placenta affecting management of mother in third trimester     Vitals:  Vitals:    10/06/17 1734   BP: 139/79   Pulse: 103   Temp: 98.2 °F (36.8 °C)   TempSrc: Oral     NST:   Fetal heart rate baseline: 130, moderate variability, accelerations present, decelerations absent    The tracing has been reviewed and is considered reactive. Biophysical Profile:   Amniotic Fluid Index: MVP of 5 cm   Tone: Present  Movement: Present  Breathing: Present  Biophysical Score: 8 / 8  Fetal Position: Cephalic    Assessment/Plan:  1. Eri Mclaughlin is a 40 y.o. female [id number] at 34w3d  2. NST/BPP 10/10   - The patient will continue with her scheduled office appointments. - She will continue with her  testing as scheduled. - Signs and Symptoms of  labor precautions were reviewed. - She was counseled on adequate hydration prior to discharge   - The patient was instructed on fetal kick counts. 3. Dr. Aubrey Nazario has reviewed this NST/BPP and agrees with the above stated assessment and plan. 4. Discussed results with Dr. Lizbeth Freire who is agreeable to the above plan.      Micael Merlin, DO  Ob/Gyn Resident   10/6/2017, 6:45 PM     Date: 10/7/2017  Time: 6:36

## 2017-10-09 ENCOUNTER — ROUTINE PRENATAL (OUTPATIENT)
Dept: OBGYN CLINIC | Age: 37
End: 2017-10-09
Payer: MEDICARE

## 2017-10-09 VITALS
WEIGHT: 267 LBS | HEART RATE: 74 BPM | SYSTOLIC BLOOD PRESSURE: 110 MMHG | BODY MASS INDEX: 41.82 KG/M2 | DIASTOLIC BLOOD PRESSURE: 68 MMHG

## 2017-10-09 DIAGNOSIS — O09.93 HIGH-RISK PREGNANCY, THIRD TRIMESTER: ICD-10-CM

## 2017-10-09 DIAGNOSIS — Z3A.35 35 WEEKS GESTATION OF PREGNANCY: Primary | ICD-10-CM

## 2017-10-09 DIAGNOSIS — O09.523 AMA (ADVANCED MATERNAL AGE) MULTIGRAVIDA 35+, THIRD TRIMESTER: ICD-10-CM

## 2017-10-09 DIAGNOSIS — O10.919 CHRONIC HYPERTENSION IN PREGNANCY: ICD-10-CM

## 2017-10-09 DIAGNOSIS — O43.93 ABNORMAL PLACENTA AFFECTING MANAGEMENT OF MOTHER IN THIRD TRIMESTER: ICD-10-CM

## 2017-10-09 PROCEDURE — 99213 OFFICE O/P EST LOW 20 MIN: CPT | Performed by: NURSE PRACTITIONER

## 2017-10-09 PROCEDURE — 59025 FETAL NON-STRESS TEST: CPT | Performed by: NURSE PRACTITIONER

## 2017-10-09 NOTE — PATIENT INSTRUCTIONS
Counting Your Baby's Kicks: Care Instructions  Your Care Instructions  Counting your baby's kicks is one way your doctor can tell that your baby is healthy. Most womenespecially in a first pregnancyfeel their baby move for the first time between 16 and 22 weeks. The movement may feel like flutters rather than kicks. Your baby may move more at certain times of the day. When you are active, you may notice less kicking than when you are resting. At your prenatal visits, your doctor will ask whether the baby is active. In your last trimester, your doctor may ask you to count the number of times you feel your baby move. Follow-up care is a key part of your treatment and safety. Be sure to make and go to all appointments, and call your doctor if you are having problems. It's also a good idea to know your test results and keep a list of the medicines you take. How do you count fetal kicks? · A common method of checking your baby's movement is to count the number of kicks or moves you feel in 1 hour. Ten movements (such as kicks, flutters, or rolls) in 1 hour are normal. Some doctors suggest that you count in the morning until you get to 10 movements. Then you can quit for that day and start again the next day. · Pick your baby's most active time of day to count. This may be any time from morning to evening. · If you do not feel 10 movements in an hour, your baby may be sleeping. Wait for the next hour and count again. When should you call for help? Call your doctor now or seek immediate medical care if:  · You noticed that your baby has stopped moving or is moving much less than normal.  Watch closely for changes in your health, and be sure to contact your doctor if you have any problems. Where can you learn more? Go to https://chpejulianaeb.[email or web address] org and sign in to your DataPad account.  Enter W896 in the [employer] box to learn more about \"Counting Your Baby's Kicks: Care Instructions. \"     If you do not have an account, please click on the \"Sign Up Now\" link. Current as of: March 16, 2017  Content Version: 11.3  © 6377-0970 [employer]. Care instructions adapted under license by [facility] ([facility]). If you have questions about a medical condition or this instruction, always ask your healthcare professional. [facility] any warranty or liability for your use of this information. Learning About When to Call Your Doctor During Pregnancy (After 20 Weeks)  Your Care Instructions  It's common to have concerns about what might be a problem during pregnancy. Although most pregnant women don't have any serious problems, it's important to know when to call your doctor if you have certain symptoms or signs of labor. These are general suggestions. Your doctor may give you some more information about when to call. When to call your doctor (after 20 weeks)  Call 911 anytime you think you may need emergency care. For example, call if:  · You have severe vaginal bleeding. · You have sudden, severe pain in your belly. · You passed out (lost consciousness). · You have a seizure. · You see or feel the umbilical cord. · You think you are about to deliver your baby and can't make it safely to the hospital.  Call your doctor now or seek immediate medical care if:  · You have vaginal bleeding. · You have belly pain. · You have a fever. · You have symptoms of preeclampsia, such as:  ¨ Sudden swelling of your face, hands, or feet. ¨ New vision problems (such as dimness or blurring). ¨ A severe headache. · You have a sudden release of fluid from your vagina. (You think your water broke.)  · You think that you may be in labor. This means that you've had at least 4 contractions within 20 minutes or at least 8 contractions in an hour.   · You notice that your baby has stopped moving or is moving much less than normal.  · You have symptoms of a urinary tract Search Health Information box to learn more about \"Weeks 34 to 36 of Your Pregnancy: Care Instructions. \"     If you do not have an account, please click on the \"Sign Up Now\" link. Current as of: March 16, 2017  Content Version: 11.3  © 0754-5720 [employer]. Care instructions adapted under license by [facility] ([facility]). If you have questions about a medical condition or this instruction, always ask your healthcare professional. [address] any warranty or liability for your use of this information.

## 2017-10-12 ENCOUNTER — HOSPITAL ENCOUNTER (OUTPATIENT)
Age: 37
Discharge: NURSING, REHAB OR LONG-TERM CARE | End: 2017-10-12
Attending: OBSTETRICS & GYNECOLOGY | Admitting: OBSTETRICS & GYNECOLOGY
Payer: MEDICARE

## 2017-10-12 VITALS
DIASTOLIC BLOOD PRESSURE: 81 MMHG | TEMPERATURE: 98.2 F | RESPIRATION RATE: 18 BRPM | SYSTOLIC BLOOD PRESSURE: 128 MMHG | HEART RATE: 99 BPM

## 2017-10-12 PROCEDURE — 99213 OFFICE O/P EST LOW 20 MIN: CPT

## 2017-10-12 NOTE — PROGRESS NOTES
TESTING NOTE    Morris Guthrie is a 40 y.o. female [id number] at 35w2d    The patient was seen and examined. She is here today for  testing for because she is at high risk for the following reasons: cHTN, AMA, and increased risk of Down Syndrome on first trimester screen and increased NT. The baby is moving well and she denies any complaints. Patient Active Problem List   Diagnosis    Hypertension    PCOS (polycystic ovarian syndrome)    History of ectopic pregnancy    Abnormal first trimester screen    Chronic hypertension in pregnancy    AMA (advanced maternal age) multigravida 35+    Increased nuchal translucency space on fetal ultrasound    Obesity complicating pregnancy    Vaginal bleeding    High-risk pregnancy    Abnormal placenta affecting management of mother in third trimester    34 weeks gestation of pregnancy    Chronic hypertension with exacerbation during pregnancy in third trimester       Vitals:  Vitals:    10/12/17 1719 10/12/17 1720   BP: (!) 111/94 128/81   Pulse: 107 99   Resp:  18   Temp:  98.2 °F (36.8 °C)   TempSrc:  Oral       NST:   Fetal heart rate baseline: 130, moderate variability, accelerations present, decelerations absent    The tracing has been reviewed and is considered reactive. Biophysical Profile:   Amniotic Fluid Index: 13.5 cm  Tone: Present  Movement: Present  Breathing: Present    Biophysical Score: 8 / 8    Fetal Position: Cephalic    Assessment/Plan:  1. Morris Guthrie is a 40 y.o. female [id number] at 35w2d  2. NST/ BPP 10/10   - The patient will continue with her scheduled office appointments. - She will continue with her  testing as scheduled. - Signs and Symptoms of  labor precautions were reviewed. - She was counseled on adequate hydration prior to discharge   - The patient was instructed on fetal kick counts. 3. Dr. Misha Alvarado has reviewed this NST/ BPP and agrees with the above stated assessment and plan.

## 2017-10-17 ENCOUNTER — ROUTINE PRENATAL (OUTPATIENT)
Dept: PERINATAL CARE | Age: 37
End: 2017-10-17
Payer: MEDICARE

## 2017-10-17 VITALS
RESPIRATION RATE: 16 BRPM | WEIGHT: 267 LBS | TEMPERATURE: 98.4 F | HEART RATE: 84 BPM | SYSTOLIC BLOOD PRESSURE: 112 MMHG | BODY MASS INDEX: 41.91 KG/M2 | DIASTOLIC BLOOD PRESSURE: 64 MMHG | HEIGHT: 67 IN

## 2017-10-17 DIAGNOSIS — Z3A.36 36 WEEKS GESTATION OF PREGNANCY: ICD-10-CM

## 2017-10-17 DIAGNOSIS — O09.523 ELDERLY MULTIGRAVIDA IN THIRD TRIMESTER: ICD-10-CM

## 2017-10-17 DIAGNOSIS — O28.9 ABNORMAL FIRST TRIMESTER SCREEN: ICD-10-CM

## 2017-10-17 DIAGNOSIS — O40.9XX0 POLYHYDRAMNIOS, ANTEPARTUM, SINGLE OR UNSPECIFIED FETUS: Primary | ICD-10-CM

## 2017-10-17 DIAGNOSIS — Z36.4 ANTENATAL SCREENING FOR FETAL GROWTH RETARDATION USING ULTRASONICS: ICD-10-CM

## 2017-10-17 DIAGNOSIS — O99.213 OBESITY AFFECTING PREGNANCY IN THIRD TRIMESTER: ICD-10-CM

## 2017-10-17 DIAGNOSIS — O28.3 INCREASED NUCHAL TRANSLUCENCY SPACE ON FETAL ULTRASOUND: ICD-10-CM

## 2017-10-17 DIAGNOSIS — O36.60X0 EXCESSIVE FETAL GROWTH AFFECTING PREGNANCY, ANTEPARTUM, SINGLE OR UNSPECIFIED FETUS: ICD-10-CM

## 2017-10-17 DIAGNOSIS — Z13.89 ENCOUNTER FOR ROUTINE SCREENING FOR MALFORMATION USING ULTRASONICS: ICD-10-CM

## 2017-10-17 PROCEDURE — 76818 FETAL BIOPHYS PROFILE W/NST: CPT | Performed by: OBSTETRICS & GYNECOLOGY

## 2017-10-17 PROCEDURE — 76805 OB US >/= 14 WKS SNGL FETUS: CPT | Performed by: OBSTETRICS & GYNECOLOGY

## 2017-10-20 ENCOUNTER — HOSPITAL ENCOUNTER (OUTPATIENT)
Age: 37
Setting detail: SPECIMEN
Discharge: HOME | End: 2017-10-20
Payer: MEDICARE

## 2017-10-20 ENCOUNTER — ROUTINE PRENATAL (OUTPATIENT)
Dept: OBGYN CLINIC | Age: 37
End: 2017-10-20
Payer: MEDICARE

## 2017-10-20 VITALS
DIASTOLIC BLOOD PRESSURE: 64 MMHG | BODY MASS INDEX: 41.88 KG/M2 | WEIGHT: 267.4 LBS | HEART RATE: 86 BPM | SYSTOLIC BLOOD PRESSURE: 128 MMHG

## 2017-10-20 DIAGNOSIS — O10.919 CHRONIC HYPERTENSION IN PREGNANCY: ICD-10-CM

## 2017-10-20 DIAGNOSIS — Z3A.36 36 WEEKS GESTATION OF PREGNANCY: Primary | ICD-10-CM

## 2017-10-20 DIAGNOSIS — O09.93 HIGH-RISK PREGNANCY, THIRD TRIMESTER: ICD-10-CM

## 2017-10-20 DIAGNOSIS — O09.523 AMA (ADVANCED MATERNAL AGE) MULTIGRAVIDA 35+, THIRD TRIMESTER: ICD-10-CM

## 2017-10-20 DIAGNOSIS — O43.93 ABNORMAL PLACENTA AFFECTING MANAGEMENT OF MOTHER IN THIRD TRIMESTER: ICD-10-CM

## 2017-10-20 LAB
GROUP B STREP CULTURE: NEGATIVE
GROUP B STREP CULTURE: POSITIVE

## 2017-10-20 PROCEDURE — 59025 FETAL NON-STRESS TEST: CPT | Performed by: NURSE PRACTITIONER

## 2017-10-20 PROCEDURE — G8417 CALC BMI ABV UP PARAM F/U: HCPCS | Performed by: NURSE PRACTITIONER

## 2017-10-20 PROCEDURE — G8484 FLU IMMUNIZE NO ADMIN: HCPCS | Performed by: NURSE PRACTITIONER

## 2017-10-20 PROCEDURE — 1036F TOBACCO NON-USER: CPT | Performed by: NURSE PRACTITIONER

## 2017-10-20 PROCEDURE — 99213 OFFICE O/P EST LOW 20 MIN: CPT | Performed by: NURSE PRACTITIONER

## 2017-10-20 PROCEDURE — G8428 CUR MEDS NOT DOCUMENT: HCPCS | Performed by: NURSE PRACTITIONER

## 2017-10-20 RX ORDER — VALACYCLOVIR HYDROCHLORIDE 500 MG/1
500 TABLET, FILM COATED ORAL DAILY
Qty: 30 TABLET | Refills: 0 | Status: SHIPPED | OUTPATIENT
Start: 2017-10-20 | End: 2017-11-19

## 2017-10-20 NOTE — Clinical Note
Pt concerned about baby already measuring 7.10 per MFM US. She has poly and baby is measuring LGA. Last baby was 10# and she did \"not have a good experience\" before. I discussed that we will continue to monitor and that we do not normally schedule IOL prior to 39 weeks unless there is a medical indication.   She is scheduled to see me the next 2 Fridays, but I will advise that she needs to see either you or Dr Mike Newman

## 2017-10-20 NOTE — PATIENT INSTRUCTIONS
infection. These may include:  ¨ Pain or burning when you urinate. ¨ A frequent need to urinate without being able to pass much urine. ¨ Pain in the flank, which is just below the rib cage and above the waist on either side of the back. ¨ Blood in your urine. Watch closely for changes in your health, and be sure to contact your doctor if:  · You have vaginal discharge that smells bad. · You have skin changes, such as:  ¨ A rash. ¨ Itching. ¨ Yellow color to your skin. · You have other concerns about your pregnancy. If you have labor signs at 37 weeks or more  If you have signs of labor at 37 weeks or more, your doctor may tell you to call when your labor becomes more active. Symptoms of active labor include:  · Contractions that are regular. · Contractions that are less than 5 minutes apart. · Contractions that are hard to talk through. Follow-up care is a key part of your treatment and safety. Be sure to make and go to all appointments, and call your doctor if you are having problems. It's also a good idea to know your test results and keep a list of the medicines you take. Where can you learn more? Go to https://chpepiceweb.healthMalcovery [email or web address] org and sign in to your [facility] account. Enter  in the Edgeware box to learn more about \"Learning About When to Call Your Doctor During Pregnancy (After 20 Weeks). \"     If you do not have an account, please click on the \"Sign Up Now\" link. Current as of: March 16, 2017  Content Version: 11.3  © 2888-5975 [employer]. Care instructions adapted under license by [facility] ([facility]). If you have questions about a medical condition or this instruction, always ask your healthcare professional. [place] [address] any warranty or liability for your use of this information. Weeks 34 to 36 of Your Pregnancy: Care Instructions  Your Care Instructions    By now, your baby and your belly have grown quite large.  It is almost time to give birth. A full-term pregnancy can deliver between 37 and 42 weeks. Your baby's lungs are almost ready to breathe air. The bones in your baby's head are now firm enough to protect it, but soft enough to move down through the birth canal.  You may feel excited, happy, anxious, or scared. You may wonder how you will know if you are in labor or what to expect during labor. Try to be flexible in your expectations of the birth. Because each birth is different, there is no way to know exactly what childbirth will be like for you. This care sheet will help you know what to expect and how to prepare. This may make your childbirth easier. If you haven't already had the Tdap shot during this pregnancy, talk to your doctor about getting it. It will help protect your  against pertussis infection. In the 36th week, most women have a test for group B streptococcus (GBS). GBS is a common bacteria that can live in the vagina and rectum. It can make your baby sick after birth. If you test positive, you will get antibiotics during labor. The medicine will keep your baby from getting the bacteria. Follow-up care is a key part of your treatment and safety. Be sure to make and go to all appointments, and call your doctor if you are having problems. It's also a good idea to know your test results and keep a list of the medicines you take. How can you care for yourself at home? Learn about pain relief choices  · Pain is different for every woman. Talk with your doctor about your feelings about pain. · You can choose from several types of pain relief. These include medicine or breathing techniques, as well as comfort measures. You can use more than one option. · If you choose to have pain medicine during labor, talk to your doctor about your options. Some medicines lower anxiety and help with some of the pain. Others make your lower body numb so that you won't feel pain.   · Be sure to tell your doctor about your pain medicine choice before you start labor or very early in your labor. You may be able to change your mind as labor progresses. · Rarely, a woman is put to sleep by medicine given through a mask or an IV. Labor and delivery  · The first stage of labor has three parts: early, active, and transition. ¨ Most women have early labor at home. You can stay busy or rest, eat light snacks, drink clear fluids, and start counting contractions. ¨ When talking during a contraction gets hard, you may be moving to active labor. During active labor, you should head for the hospital if you are not there already. ¨ You are in active labor when contractions come every 3 to 4 minutes and last about 60 seconds. Your cervix is opening more rapidly. ¨ If your water breaks, contractions will come faster and stronger. ¨ During transition, your cervix is stretching, and contractions are coming more rapidly. ¨ You may want to push, but your cervix might not be ready. Your doctor will tell you when to push. · The second stage starts when your cervix is completely opened and you are ready to push. ¨ Contractions are very strong to push the baby down the birth canal.  ¨ You will feel the urge to push. You may feel like you need to have a bowel movement. ¨ You may be coached to push with contractions. These contractions will be very strong, but you will not have them as often. You can get a little rest between contractions. ¨ You may be emotional and irritable. You may not be aware of what is going on around you. ¨ One last push, and your baby is born. · The third stage is when a few more contractions push out the placenta. This may take 30 minutes or less. · The fourth stage is the welcome recovery. You may feel overwhelmed with emotions and exhausted but alert. This is a good time to start breastfeeding. Where can you learn more? Go to https://maria antonia.[email or web address] org and sign in to your Peela account.  Enter I211 in the

## 2017-10-23 LAB
CULTURE: ABNORMAL
CULTURE: ABNORMAL
Lab: ABNORMAL
SPECIMEN DESCRIPTION: ABNORMAL
STATUS: ABNORMAL

## 2017-10-24 ENCOUNTER — HOSPITAL ENCOUNTER (OUTPATIENT)
Dept: LABOR AND DELIVERY | Age: 37
Discharge: HOME | End: 2017-10-24
Attending: OBSTETRICS & GYNECOLOGY | Admitting: OBSTETRICS & GYNECOLOGY
Payer: MEDICARE

## 2017-10-24 VITALS
SYSTOLIC BLOOD PRESSURE: 138 MMHG | DIASTOLIC BLOOD PRESSURE: 89 MMHG | HEART RATE: 94 BPM | TEMPERATURE: 97.6 F | RESPIRATION RATE: 18 BRPM

## 2017-10-24 PROCEDURE — 59025 FETAL NON-STRESS TEST: CPT

## 2017-10-24 NOTE — PROGRESS NOTES
the above stated assessment and plan. 4. Discussed results with Dr. Cj Marin who is agreeable to the above plan.      Dennise Tellez DO  Ob/Gyn Resident   10/24/2017, 5:10 PM

## 2017-10-27 ENCOUNTER — ROUTINE PRENATAL (OUTPATIENT)
Dept: OBGYN CLINIC | Age: 37
End: 2017-10-27
Payer: MEDICARE

## 2017-10-27 VITALS — SYSTOLIC BLOOD PRESSURE: 124 MMHG | DIASTOLIC BLOOD PRESSURE: 66 MMHG | BODY MASS INDEX: 42.13 KG/M2 | WEIGHT: 269 LBS

## 2017-10-27 DIAGNOSIS — O43.93 ABNORMAL PLACENTA AFFECTING MANAGEMENT OF MOTHER IN THIRD TRIMESTER: ICD-10-CM

## 2017-10-27 DIAGNOSIS — O36.60X0 EXCESSIVE FETAL GROWTH AFFECTING PREGNANCY, ANTEPARTUM, SINGLE OR UNSPECIFIED FETUS: ICD-10-CM

## 2017-10-27 DIAGNOSIS — O40.9XX0 POLYHYDRAMNIOS, ANTEPARTUM, SINGLE OR UNSPECIFIED FETUS: ICD-10-CM

## 2017-10-27 DIAGNOSIS — O09.93 HIGH-RISK PREGNANCY IN THIRD TRIMESTER: Primary | ICD-10-CM

## 2017-10-27 DIAGNOSIS — Z3A.37 37 WEEKS GESTATION OF PREGNANCY: ICD-10-CM

## 2017-10-27 PROCEDURE — 99213 OFFICE O/P EST LOW 20 MIN: CPT | Performed by: OBSTETRICS & GYNECOLOGY

## 2017-10-27 PROCEDURE — G8417 CALC BMI ABV UP PARAM F/U: HCPCS | Performed by: OBSTETRICS & GYNECOLOGY

## 2017-10-27 PROCEDURE — G8484 FLU IMMUNIZE NO ADMIN: HCPCS | Performed by: OBSTETRICS & GYNECOLOGY

## 2017-10-27 PROCEDURE — 1036F TOBACCO NON-USER: CPT | Performed by: OBSTETRICS & GYNECOLOGY

## 2017-10-27 PROCEDURE — 59025 FETAL NON-STRESS TEST: CPT | Performed by: OBSTETRICS & GYNECOLOGY

## 2017-10-27 PROCEDURE — G8427 DOCREV CUR MEDS BY ELIG CLIN: HCPCS | Performed by: OBSTETRICS & GYNECOLOGY

## 2017-10-30 ENCOUNTER — TELEPHONE (OUTPATIENT)
Dept: OBGYN CLINIC | Age: 37
End: 2017-10-30

## 2017-10-30 NOTE — TELEPHONE ENCOUNTER
OB 37.6wks    Pt calling asking if she needs to keep her appt with SV DARREN in morning of her induction at [facility] on 11/07/17? MFM appt in morning and induction is in evening. Pt is already scheduled for 2 other NST's and OB check also.

## 2017-10-30 NOTE — TELEPHONE ENCOUNTER
No, she does not need to see M the morning of her induction. Keep  testing this week and she can have her NST here if she has one on .

## 2017-11-01 ENCOUNTER — HOSPITAL ENCOUNTER (OUTPATIENT)
Age: 37
Discharge: HOME | End: 2017-11-01
Attending: OBSTETRICS & GYNECOLOGY | Admitting: OBSTETRICS & GYNECOLOGY
Payer: MEDICARE

## 2017-11-01 ENCOUNTER — HOSPITAL ENCOUNTER (OUTPATIENT)
Dept: LABOR AND DELIVERY | Age: 37
Discharge: HOME | End: 2017-11-01
Payer: MEDICARE

## 2017-11-01 VITALS
TEMPERATURE: 98.2 F | DIASTOLIC BLOOD PRESSURE: 83 MMHG | HEART RATE: 106 BPM | SYSTOLIC BLOOD PRESSURE: 134 MMHG | RESPIRATION RATE: 18 BRPM

## 2017-11-01 PROCEDURE — 76818 FETAL BIOPHYS PROFILE W/NST: CPT

## 2017-11-01 PROCEDURE — 76818 FETAL BIOPHYS PROFILE W/NST: CPT | Performed by: OBSTETRICS & GYNECOLOGY

## 2017-11-01 NOTE — PROGRESS NOTES
TESTING NOTE    Zak Hall is a 40 y.o. [id number] 38w1d    The patient was seen and examined. She is here today for  testing for because she is at high risk for the following reasons: cHTN, AMA, polyhydramnios. The baby is moving well. She denies headache, vision changes, RUQ pain, increased swelling, leakage of fluid, vaginal bleeding and contractions. Her next appointment with Dr. Liane Escalante is on 11/3/17. Patient Active Problem List   Diagnosis    Hypertension    PCOS (polycystic ovarian syndrome)    History of ectopic pregnancy    Abnormal first trimester screen    Chronic hypertension in pregnancy    AMA (advanced maternal age) multigravida 35+    Increased nuchal translucency space on fetal ultrasound    Obesity complicating pregnancy    Vaginal bleeding    High-risk pregnancy    Abnormal placenta affecting management of mother in third trimester    34 weeks gestation of pregnancy    Chronic hypertension with exacerbation during pregnancy in third trimester    Polyhydramnios, antepartum complication    LGA (large for gestational age) fetus affecting mother, antepartum       Vitals:  Vitals:    17 1700   BP: 134/83   Pulse: 106   Resp: 18   Temp: 98.2 °F (36.8 °C)   TempSrc: Oral         NST: The tracing has been reviewed and is considered reactive. FHT's: 140  Variability: moderate   Accelerations: present  Decelerations: absent    Biophysical Profile:   MVP: 10.55cm  Tone: Present  Movement: Present  Breathing: Present    Biophysical Score: 8 / 8    Fetal Position: Cephalic    Assessment/Plan:  1. Zak Hall is a 40 y.o. female 38w1d  2. NST/ BPP 10/10   - The patient will continue with her scheduled office appointments. - She will continue with her  testing as scheduled. - Signs and Symptoms of labor precautions were reviewed.    - She was counseled on adequate hydration prior to discharge   - The patient was instructed on fetal kick Cardiology

## 2017-11-03 ENCOUNTER — ROUTINE PRENATAL (OUTPATIENT)
Dept: OBGYN CLINIC | Age: 37
End: 2017-11-03
Payer: MEDICARE

## 2017-11-03 VITALS — SYSTOLIC BLOOD PRESSURE: 118 MMHG | BODY MASS INDEX: 42.13 KG/M2 | WEIGHT: 269 LBS | DIASTOLIC BLOOD PRESSURE: 72 MMHG

## 2017-11-03 DIAGNOSIS — O43.93 ABNORMAL PLACENTA AFFECTING MANAGEMENT OF MOTHER IN THIRD TRIMESTER: ICD-10-CM

## 2017-11-03 DIAGNOSIS — O10.913 CHRONIC HYPERTENSION WITH EXACERBATION DURING PREGNANCY IN THIRD TRIMESTER: Primary | ICD-10-CM

## 2017-11-03 PROCEDURE — G8428 CUR MEDS NOT DOCUMENT: HCPCS | Performed by: OBSTETRICS & GYNECOLOGY

## 2017-11-03 PROCEDURE — 59025 FETAL NON-STRESS TEST: CPT | Performed by: OBSTETRICS & GYNECOLOGY

## 2017-11-03 PROCEDURE — 1036F TOBACCO NON-USER: CPT | Performed by: OBSTETRICS & GYNECOLOGY

## 2017-11-03 PROCEDURE — G8417 CALC BMI ABV UP PARAM F/U: HCPCS | Performed by: OBSTETRICS & GYNECOLOGY

## 2017-11-03 PROCEDURE — 99213 OFFICE O/P EST LOW 20 MIN: CPT | Performed by: OBSTETRICS & GYNECOLOGY

## 2017-11-03 PROCEDURE — G8484 FLU IMMUNIZE NO ADMIN: HCPCS | Performed by: OBSTETRICS & GYNECOLOGY

## 2017-11-03 NOTE — PROGRESS NOTES
+FM, -ctxns, -VB, -LOF    Gen-NAD  CVS-RRR  Resp-nonlabored  Abd-soft, nontender, gravid  Ext-no edema      ICD-10-CM ICD-9-CM    1. Chronic hypertension with exacerbation during pregnancy in third trimester O10.913 642.03 ND FETAL NON-STRESS TEST   2.  Abnormal placenta affecting management of mother in third trimester O43.93 656.73 ND FETAL NON-STRESS TEST       Scheduled for IOL on 11/7/17, cervix 2-3cm/60%/-3, soft, mid  NST reactive today  BP normal  On Valtrex, GBS +  Labor precautions reviewed

## 2017-11-22 ENCOUNTER — POSTPARTUM VISIT (OUTPATIENT)
Dept: OBGYN CLINIC | Age: 37
End: 2017-11-22

## 2017-11-22 VITALS — WEIGHT: 204 LBS | HEIGHT: 67 IN | BODY MASS INDEX: 32.02 KG/M2

## 2017-11-22 PROCEDURE — 1036F TOBACCO NON-USER: CPT | Performed by: OBSTETRICS & GYNECOLOGY

## 2017-11-22 PROCEDURE — G8417 CALC BMI ABV UP PARAM F/U: HCPCS | Performed by: OBSTETRICS & GYNECOLOGY

## 2017-11-22 PROCEDURE — G8484 FLU IMMUNIZE NO ADMIN: HCPCS | Performed by: OBSTETRICS & GYNECOLOGY

## 2017-11-22 PROCEDURE — 99024 POSTOP FOLLOW-UP VISIT: CPT | Performed by: OBSTETRICS & GYNECOLOGY

## 2017-11-22 PROCEDURE — G8427 DOCREV CUR MEDS BY ELIG CLIN: HCPCS | Performed by: OBSTETRICS & GYNECOLOGY

## 2017-11-22 RX ORDER — ACETAMINOPHEN AND CODEINE PHOSPHATE 120; 12 MG/5ML; MG/5ML
1 SOLUTION ORAL DAILY
Qty: 28 TABLET | Refills: 5 | Status: SHIPPED | OUTPATIENT
Start: 2017-11-22 | End: 2018-01-10 | Stop reason: ALTCHOICE

## 2017-12-20 ENCOUNTER — POSTPARTUM VISIT (OUTPATIENT)
Dept: OBGYN CLINIC | Age: 37
End: 2017-12-20
Payer: MEDICARE

## 2017-12-20 VITALS
DIASTOLIC BLOOD PRESSURE: 70 MMHG | HEIGHT: 67 IN | BODY MASS INDEX: 39.68 KG/M2 | WEIGHT: 252.8 LBS | SYSTOLIC BLOOD PRESSURE: 120 MMHG

## 2017-12-20 PROCEDURE — G8417 CALC BMI ABV UP PARAM F/U: HCPCS | Performed by: OBSTETRICS & GYNECOLOGY

## 2017-12-20 PROCEDURE — G8428 CUR MEDS NOT DOCUMENT: HCPCS | Performed by: OBSTETRICS & GYNECOLOGY

## 2017-12-20 PROCEDURE — G8484 FLU IMMUNIZE NO ADMIN: HCPCS | Performed by: OBSTETRICS & GYNECOLOGY

## 2017-12-20 PROCEDURE — 1036F TOBACCO NON-USER: CPT | Performed by: OBSTETRICS & GYNECOLOGY

## 2017-12-20 NOTE — PROGRESS NOTES
The patient was seen. [id number]. She delivered a girl Aide Remy on 11/15 . She has no complaints today. She is  breast feeding (occasionally, though mostly bottle feeding) and there are not signs/symptoms of mastitis. Today her lochia is light she denies any dizziness or shortness of breath. Her pregnancy was complicated by hypertension, though her BP is normal today. She does not have any signs or symptoms of post partum depression. She does have good home support. Her bowels are regular and shedenies any urinary tract symptomology. Her choice for birth control is to continue the Micronor for now--she will return to work after [employer] and may change to regular BCP then.       1. Postpartum care following vaginal delivery       Patient Active Problem List   Diagnosis    Hypertension    PCOS (polycystic ovarian syndrome)    History of ectopic pregnancy    Abnormal first trimester screen    Chronic hypertension in pregnancy    AMA (advanced maternal age) multigravida 35+    Increased nuchal translucency space on fetal ultrasound    Obesity complicating pregnancy    Vaginal bleeding    High-risk pregnancy    Abnormal placenta affecting management of mother in third trimester    34 weeks gestation of pregnancy    Chronic hypertension with exacerbation during pregnancy in third trimester    Polyhydramnios, antepartum complication    LGA (large for gestational age) fetus affecting mother, antepartum    Elderly multigravida in third trimester    Secondary hypertension       Past Medical History:   Diagnosis Date    Back pain     Chlamydia     Depression 2013    on antidepressant loss of ectopic pregnancy    Herpes simplex without mention of complication     History of blood transfusion 2013    transfusion with ectopic pregnancy    Hypertension     No problem since 1/2015    Infertility, female     PCOS (polycystic ovarian syndrome)     Seasonal allergies     Trauma 2015    Uterine disorder 2006 breastfeeding. General Appearance: This  is a well Developed, well Nourished, well groomed female. Her BMI was reviewed. Nutritional decision making was discussed. Skin:  There was a Normal Inspection of the skin. There were no rashes or lesions. Lymphatic:  No Lymph Nodes were Palpable in the neck , axilla or groin. Neck and EENT:  The neck was supple. There were no masses   The thyroid was not enlarged and had no masses. Perrla  Cardiovascular: The lungs were auscultated and found to be clear. There were no rales, rhonchi or wheezes. There was a good respiratory effort. The heart was in a regular rate and rhythm. There was no murmur appreciated. The patients extremities were without calf tenderness, edema, or varicosities. Abdomen: The abdomen was soft and non-tender. There were good bowel sounds in all quadrants and there was no guarding, rebound or rigidity. On evaluation there was no evidence of hepatosplenomegaly and there was no costal vertebral zeke tenderness bilaterally. No hernias were appreciated. Psych: The patient had a normal Orientation to: Time, Place, Person, and Situation  There is no Mood / Affect changes  I finds no signs of postpartum depression  Breast:  (Chest)  The patients breasts were symmetrical.  There was no masses, discharge or retractions bilateral.  Both nipples were everted. Self breast exams were reviewed in detail. Literature was given. Pelvic Exam:  The external genitalia was without lesions or masses with a normal appearance. The vaginal vault was normal. There were no Cystocele, Rectocele, or enterocele appreciated. There was no vaginal discharge. The urethra is in a normal anatomic position. No masses or tenderness were noted. The bladder was smooth without masses, fullness, or tenderness. The cervix was without lesions. There was no cervical motion tenderness. The uterus has returned to normal size.  It was mobile, midline and regular. The  adnexa were palpated and noted no fullness, tenderness or masses in either region. Rectal Exam:  Noted good sphincter tone and no masses. Musculoskeletal:  Normal Gait and station was noted. Digits were evaluated without abnormal findings. Range of motion, stability and strength were evaluated and found to be appropriate for the patients age. Assessment:  1. Postpartum care following vaginal delivery       Chief Complaint   Patient presents with    Postpartum Care     6 weeks post vag delivery @ [facility] 11/5/17. Female \" Chary\" occassionally breast feeding.  Other     Last annual exam and pap 3/28/17. Pap was negative. Plan:  1. Return to the office for annual exam  2. Signs & Symptoms of mastitis reviewed; notify if occurs  3. Secondary smoke risks reviewed. Increased risks of respiratory problems, Sudden infant death syndrome, and potential malignancies.   4. Birth control to continue with Micronor, in spite of acne and weight gain concerns, until she ends breast feeding entirely

## 2018-01-10 ENCOUNTER — TELEPHONE (OUTPATIENT)
Dept: OBGYN CLINIC | Age: 38
End: 2018-01-10

## 2018-01-10 DIAGNOSIS — Z30.09 FAMILY PLANNING: ICD-10-CM

## 2018-01-10 RX ORDER — NORETHINDRONE ACETATE AND ETHINYL ESTRADIOL 1.5-30(21)
1 KIT ORAL DAILY
Qty: 28 TABLET | Refills: 12 | Status: SHIPPED | OUTPATIENT
Start: 2018-01-10 | End: 2018-03-30 | Stop reason: SDUPTHER

## 2018-01-10 NOTE — PROGRESS NOTES
Phone call:  The patient has stopped breast-feeding so once to go back to her pill before, which is Microgestin 1.5

## 2018-02-01 ENCOUNTER — OFFICE VISIT (OUTPATIENT)
Dept: OBGYN CLINIC | Age: 38
End: 2018-02-01
Payer: MEDICARE

## 2018-02-01 VITALS
SYSTOLIC BLOOD PRESSURE: 120 MMHG | HEIGHT: 68 IN | WEIGHT: 256 LBS | DIASTOLIC BLOOD PRESSURE: 64 MMHG | BODY MASS INDEX: 38.8 KG/M2

## 2018-02-01 DIAGNOSIS — N75.0 CYST OF RIGHT BARTHOLIN'S GLAND: Primary | ICD-10-CM

## 2018-02-01 PROCEDURE — 1036F TOBACCO NON-USER: CPT | Performed by: OBSTETRICS & GYNECOLOGY

## 2018-02-01 PROCEDURE — G8427 DOCREV CUR MEDS BY ELIG CLIN: HCPCS | Performed by: OBSTETRICS & GYNECOLOGY

## 2018-02-01 PROCEDURE — 99213 OFFICE O/P EST LOW 20 MIN: CPT | Performed by: OBSTETRICS & GYNECOLOGY

## 2018-02-01 PROCEDURE — G8417 CALC BMI ABV UP PARAM F/U: HCPCS | Performed by: OBSTETRICS & GYNECOLOGY

## 2018-02-01 PROCEDURE — G8484 FLU IMMUNIZE NO ADMIN: HCPCS | Performed by: OBSTETRICS & GYNECOLOGY

## 2018-02-01 RX ORDER — DOXYCYCLINE HYCLATE 100 MG
100 TABLET ORAL 2 TIMES DAILY
Qty: 14 TABLET | Refills: 0 | Status: SHIPPED | OUTPATIENT
Start: 2018-02-01 | End: 2018-02-08

## 2018-02-01 ASSESSMENT — ENCOUNTER SYMPTOMS
BLURRED VISION: 0
DIARRHEA: 0
CONSTIPATION: 0
COUGH: 0
ABDOMINAL PAIN: 0
WHEEZING: 0
VOMITING: 0
NAUSEA: 0
ORTHOPNEA: 0
HEARTBURN: 0
DOUBLE VISION: 0

## 2018-02-01 NOTE — PROGRESS NOTES
Refill    norethindrone-ethinyl estradiol-iron (MICROGESTIN FE 1.5/30) 1.5-30 MG-MCG tablet Take 1 tablet by mouth daily 28 tablet 12    Prenatal Vit-Fe Fumarate-FA (PNV PRENATAL PLUS MULTIVITAMIN) 27-1 MG TABS Take 1 tablet by mouth daily 30 tablet 12    cetirizine (ZYRTEC) 10 MG tablet Take 10 mg by mouth daily. No current facility-administered medications for this visit. Allergies:  Codeine and Sulfa antibiotics    Gynecologic History:  Patient's last menstrual period was 2018. Obstetric History       T2      L2     SAB0   TAB0   Ectopic1   Molar0   Multiple0   Live Births2        Review of Systems   Constitutional: Negative for chills, fever and weight loss. HENT: Negative for hearing loss. Eyes: Negative for blurred vision and double vision. Respiratory: Negative for cough and wheezing. Cardiovascular: Negative for chest pain, palpitations and orthopnea. Gastrointestinal: Negative for abdominal pain, constipation, diarrhea, heartburn, nausea and vomiting. Genitourinary: Negative for dysuria, frequency and urgency. Musculoskeletal: Negative for joint pain and myalgias. Skin: Negative for rash. Neurological: Negative for dizziness, tingling, focal weakness, weakness and headaches. Endo/Heme/Allergies: Does not bruise/bleed easily. Psychiatric/Behavioral: Negative for depression, substance abuse and suicidal ideas. The patient does not have insomnia. /64   Ht 5' 7.5\" (1.715 m)   Wt 256 lb (116.1 kg)   LMP 2018   Breastfeeding? No   BMI 39.50 kg/m²     Physical Exam   Constitutional: She is oriented to person, place, and time. She appears well-developed and well-nourished. HENT:   Head: Normocephalic. Eyes: EOM are normal.   Neck: Normal range of motion. No thyromegaly present. Cardiovascular: Normal rate and regular rhythm. Pulmonary/Chest: Effort normal. No respiratory distress. Abdominal: Soft.  She exhibits no

## 2018-02-01 NOTE — PATIENT INSTRUCTIONS
harmful. · Wear panty liners or pads if you have discharge from the draining cyst.  · If you are sexually active, avoid sex until:  ¨ You have finished the antibiotics. ¨ The area has healed. · If you had a catheter placed in the cyst to help it drain, follow your doctor's instructions for activities until the tube comes out. When should you call for help? Call your doctor now or seek immediate medical care if:  ? · You have symptoms of a new or worse infection, such as:  ¨ Increased pain, swelling, warmth, or redness. ¨ Red streaks leading from the area. ¨ Pus draining from the area. ¨ A fever. ? Watch closely for changes in your health, and be sure to contact your doctor if:  ? · The catheter falls out. ? · You are not getting better as expected. Where can you learn more? Go to [email or web address] and sign in to your [employer] account. Enter H276 in the [employer] box to learn more about \"Bartholin Gland Cyst: Care Instructions. \"     If you do not have an account, please click on the \"Sign Up Now\" link. Current as of: October 13, 2016  Content Version: 11.5  © 3105-1390 [employer]wise, [employer]. Care instructions adapted under license by [facility] ([facility]). If you have questions about a medical condition or this instruction, always ask your healthcare professional. [address]  any warranty or liability for your use of this information.

## 2018-03-30 ENCOUNTER — OFFICE VISIT (OUTPATIENT)
Dept: OBGYN CLINIC | Age: 38
End: 2018-03-30
Payer: MEDICARE

## 2018-03-30 VITALS
BODY MASS INDEX: 40.4 KG/M2 | DIASTOLIC BLOOD PRESSURE: 84 MMHG | SYSTOLIC BLOOD PRESSURE: 116 MMHG | HEIGHT: 67 IN | WEIGHT: 257.4 LBS

## 2018-03-30 DIAGNOSIS — Z30.09 FAMILY PLANNING: ICD-10-CM

## 2018-03-30 DIAGNOSIS — Z01.419 ENCOUNTER FOR GYNECOLOGICAL EXAMINATION WITHOUT ABNORMAL FINDING: Primary | ICD-10-CM

## 2018-03-30 LAB — PAP SMEAR: NORMAL

## 2018-03-30 PROCEDURE — 99395 PREV VISIT EST AGE 18-39: CPT | Performed by: NURSE PRACTITIONER

## 2018-03-30 RX ORDER — NORETHINDRONE ACETATE AND ETHINYL ESTRADIOL 1.5-30(21)
1 KIT ORAL DAILY
Qty: 28 TABLET | Refills: 12 | Status: SHIPPED | OUTPATIENT
Start: 2018-03-30 | End: 2019-04-08 | Stop reason: ALTCHOICE

## 2018-03-30 ASSESSMENT — ENCOUNTER SYMPTOMS
DIARRHEA: 0
CONSTIPATION: 0
COUGH: 0
SHORTNESS OF BREATH: 0
ABDOMINAL DISTENTION: 0
BACK PAIN: 0
ABDOMINAL PAIN: 0

## 2018-03-30 NOTE — PROGRESS NOTES
HPI:     Riki Todd is a 40 y.o. female who presents today for:   Chief Complaint   Patient presents with    Annual Exam     last pap 3/28/17-WNL        HPI  Here for annual exam.  Works at [employer] 3 children- 11/3/5 months. Working on healthy eating and adding activity. Feels like she has had problems with PCOS in the past and that this is the reason that she has had difficulty losing weight. We had a long discussion about how PCOS can affect the body and long term concerns. She has not seen her PCP in quite some time. I ordered a HgbA1c, but she knows that she will need to FU with her PCP if it is abnormal, but I also advised an annual well visit with her  GYNECOLOGIC HISTORY:  Menstrual History: Patient's last menstrual period was 02/26/2018. Sexually Transmitted Infections: No  History of Ectopic Pregnancy:No  Menarche: 12  Cycles q 28-30 Days-   Duration of cycles: 5  Dysmenorrhea: mild  Sexually active: yes  Dyspareunia: no    Current Outpatient Prescriptions   Medication Sig Dispense Refill    norethindrone-ethinyl estradiol-iron (MICROGESTIN FE 1.5/30) 1.5-30 MG-MCG tablet Take 1 tablet by mouth daily 28 tablet 12    Prenatal Vit-Fe Fumarate-FA (PNV PRENATAL PLUS MULTIVITAMIN) 27-1 MG TABS Take 1 tablet by mouth daily 30 tablet 12    cetirizine (ZYRTEC) 10 MG tablet Take 10 mg by mouth daily. No current facility-administered medications for this visit.       Allergies   Allergen Reactions    Codeine Nausea And Vomiting    Sulfa Antibiotics Rash       Past Medical History:   Diagnosis Date    Back pain     Chlamydia     Depression 2013    on antidepressant loss of ectopic pregnancy    Herpes simplex without mention of complication     History of blood transfusion 2013    transfusion with ectopic pregnancy    Hypertension     No problem since 1/2015    Infertility, female     PCOS (polycystic ovarian syndrome)     Seasonal allergies     Trauma 2015    Uterine disorder 2006    septation noted, but not noted on any subsequent ultrasound     Denies family history of breast, ovarian, uterus, colon CA     Past Surgical History:   Procedure Laterality Date    ECTOPIC PREGNANCY SURGERY  7/2013    Left fallopian tube removed    NASAL SEPTUM SURGERY  11/12/12    with turbinate reduction and polypectomy, ENT- dr. Earnest Morales       Family History   Problem Relation Age of Onset    Diabetes Paternal Grandfather     Heart Disease Paternal Grandfather     Thyroid Disease Maternal Grandmother     Diabetes Maternal Grandfather     Hypertension Maternal Grandfather     Hypertension Father     Heart Surgery Father     Thyroid Disease Mother     High Blood Pressure Mother     No Known Problems Paternal Grandmother     Breast Cancer Maternal Aunt      in her 48\"s     Social History   Substance Use Topics    Smoking status: Never Smoker    Smokeless tobacco: Never Used    Alcohol use No        Subjective:      Review of Systems   Constitutional: Negative for appetite change and fatigue. HENT: Negative for congestion and hearing loss. Eyes: Negative for visual disturbance. Respiratory: Negative for cough and shortness of breath. Cardiovascular: Negative for chest pain and palpitations. Gastrointestinal: Negative for abdominal distention, abdominal pain, constipation and diarrhea. Genitourinary: Negative for flank pain, frequency, menstrual problem, pelvic pain and vaginal discharge. Musculoskeletal: Positive for arthralgias (has plantar fasciitis). Negative for back pain. Neurological: Negative for syncope and headaches. Psychiatric/Behavioral: Negative for behavioral problems. Objective:     /84 (Site: Right Arm, Position: Sitting, Cuff Size: Large Adult)   Ht 5' 6.5\" (1.689 m)   Wt 257 lb 6.4 oz (116.8 kg)   LMP 02/26/2018   Breastfeeding?  No   BMI 40.92 kg/m²   Physical Exam   Constitutional: She is oriented to person, place, and time. She appears well-developed and well-nourished. Eyes: Pupils are equal, round, and reactive to light. Neck: No tracheal deviation present. No thyromegaly present. Cardiovascular: Normal rate, regular rhythm and normal heart sounds. Pulmonary/Chest: Effort normal and breath sounds normal. No respiratory distress. Right breast exhibits no inverted nipple. Left breast exhibits no inverted nipple, no mass, no nipple discharge, no skin change and no tenderness. Breasts are symmetrical.   Abdominal: Soft. Bowel sounds are normal. She exhibits no distension and no mass. There is no tenderness. There is no CVA tenderness. Hernia confirmed negative in the right inguinal area and confirmed negative in the left inguinal area. Genitourinary: No breast swelling, tenderness, discharge or bleeding. There is no rash or lesion on the right labia. There is no rash or lesion on the left labia. Uterus is not deviated, not enlarged and not fixed. Cervix exhibits no motion tenderness, no discharge and no friability. Right adnexum displays no mass, no tenderness and no fullness. Left adnexum displays no mass, no tenderness and no fullness. No tenderness in the vagina. No vaginal discharge found. Musculoskeletal: She exhibits no edema or tenderness. Lymphadenopathy:     She has no cervical adenopathy. Right: No inguinal adenopathy present. Left: No inguinal adenopathy present. Neurological: She is alert and oriented to person, place, and time. Skin: Skin is warm and dry. Psychiatric: She has a normal mood and affect. Her behavior is normal. Judgment and thought content normal.         Assessment:     1. Encounter for gynecological examination without abnormal finding  PAP SMEAR         Plan:   1. Pap collected. Discussed new pap smear guidelines. Desires re-pap in 1 year. 2. Breast self exam reviewed  3. Calcium and Vitamin D dosing reviewed. 4. Seat belt use reviewed  5.  Family planning reviewed. Birth control ocp  6.  Will consider HgA1c  7. FU PCP for wellness exam    Electronically signed by Cherri Paul on 3/30/2018

## 2018-04-13 DIAGNOSIS — Z01.419 ENCOUNTER FOR GYNECOLOGICAL EXAMINATION WITHOUT ABNORMAL FINDING: ICD-10-CM

## 2018-09-18 RX ORDER — VITAMIN A, VITAMIN C, VITAMIN D-3, VITAMIN E, VITAMIN B-1, VITAMIN B-2, NIACIN, VITAMIN B-6, CALCIUM, IRON, ZINC, COPPER 4000; 120; 400; 22; 1.84; 3; 20; 10; 1; 12; 200; 27; 25; 2 [IU]/1; MG/1; [IU]/1; MG/1; MG/1; MG/1; MG/1; MG/1; MG/1; UG/1; MG/1; MG/1; MG/1; MG/1
TABLET ORAL
Qty: 30 TABLET | Refills: 11 | Status: SHIPPED | OUTPATIENT
Start: 2018-09-18 | End: 2019-04-29

## 2019-04-08 ENCOUNTER — TELEPHONE (OUTPATIENT)
Dept: OBGYN CLINIC | Age: 39
End: 2019-04-08

## 2019-04-08 ENCOUNTER — OFFICE VISIT (OUTPATIENT)
Dept: OBGYN CLINIC | Age: 39
End: 2019-04-08
Payer: MEDICARE

## 2019-04-08 VITALS
DIASTOLIC BLOOD PRESSURE: 84 MMHG | HEIGHT: 67 IN | WEIGHT: 278.2 LBS | SYSTOLIC BLOOD PRESSURE: 108 MMHG | BODY MASS INDEX: 43.66 KG/M2

## 2019-04-08 DIAGNOSIS — Z30.09 FAMILY PLANNING: ICD-10-CM

## 2019-04-08 DIAGNOSIS — Z01.419 ENCOUNTER FOR GYNECOLOGICAL EXAMINATION: Primary | ICD-10-CM

## 2019-04-08 LAB — PAP SMEAR: NORMAL

## 2019-04-08 PROCEDURE — 99395 PREV VISIT EST AGE 18-39: CPT | Performed by: NURSE PRACTITIONER

## 2019-04-08 RX ORDER — NORETHINDRONE ACETATE AND ETHINYL ESTRADIOL 1.5-30(21)
KIT ORAL
Qty: 28 TABLET | Refills: 12 | Status: SHIPPED | OUTPATIENT
Start: 2019-04-08 | End: 2019-04-29

## 2019-04-08 ASSESSMENT — ENCOUNTER SYMPTOMS
BACK PAIN: 0
CONSTIPATION: 0
ABDOMINAL PAIN: 0
ABDOMINAL DISTENTION: 0
COUGH: 0
SHORTNESS OF BREATH: 0
DIARRHEA: 0

## 2019-04-08 NOTE — PROGRESS NOTES
HPI:     Keke Pérez a 46 y.o. female who presents today for:  Chief Complaint   Patient presents with    Annual Exam     last pap 3/30/18-WNL     Medication Refill     refill bcp        HPI  Here for annual exam. Works in day care for 19 years. Has 3 children- 12/4/17 months. Working on healthy eating and adding activity. GYNECOLOGICHISTORY:  MenstrualHistory: Patient's last menstrual period was 03/29/2019. Sexually Transmitted Infections: No  History of Ectopic Pregnancy:No  Menarche: 12  Cycles q 28 Days  Durationof cycles: 2-5  Dysmenorrhea: mild  Sexually active: yes  Dyspareunia: no    Current Outpatient Medications   Medication Sig Dispense Refill    BLISOVI FE 1.5/30 1.5-30 MG-MCG tablet TAKE ONE TABLET BY MOUTH DAILY 28 tablet 2    Prenatal Vit-Fe Fumarate-FA (PRENATAL VITAMIN PLUS LOW IRON) 27-1 MG TABS TAKE ONE TABLET BY MOUTH DAILY 30 tablet 11    cetirizine (ZYRTEC) 10 MG tablet Take 10 mg by mouth daily. No current facility-administered medications for this visit.       Allergies   Allergen Reactions    Codeine Nausea And Vomiting    Sulfa Antibiotics Rash       Past Medical History:   Diagnosis Date    Back pain     Chlamydia     Depression 2013    on antidepressant loss of ectopic pregnancy    Herpes simplex without mention of complication     History of blood transfusion 2013    transfusion with ectopic pregnancy    Hypertension     No problem since 1/2015    Infertility, female     PCOS (polycystic ovarian syndrome)     Seasonal allergies     Trauma 2015    Uterine disorder 2006    septation noted, but not noted on any subsequent ultrasound     Denies family history of breast, ovarian, uterus, colon CA     Past Surgical History:   Procedure Laterality Date    ECTOPIC PREGNANCY SURGERY  7/2013    Left fallopian tube removed    NASAL SEPTUM SURGERY  11/12/12    with turbinate reduction and polypectomy, ENT- dr. Era Sanchez       Family History   Problem Relation Age of Onset    Diabetes Paternal Grandfather     Heart Disease Paternal Grandfather     Thyroid Disease Maternal Grandmother     Diabetes Maternal Grandfather     Hypertension Maternal Grandfather     Hypertension Father     Heart Surgery Father     Thyroid Disease Mother     High Blood Pressure Mother     No Known Problems Paternal Grandmother     Breast Cancer Maternal Aunt         in her 48\"s     Social History     Tobacco Use    Smoking status: Never Smoker    Smokeless tobacco: Never Used   Substance Use Topics    Alcohol use: No        Subjective:      Review of Systems   Constitutional: Negative for appetite change and fatigue. HENT: Negative for congestion and hearing loss. Eyes: Negative for visual disturbance. Respiratory: Negative for cough and shortness of breath. Cardiovascular: Negative for chest pain and palpitations. Gastrointestinal: Negative for abdominal distention, abdominal pain, constipation and diarrhea. Genitourinary: Negative for flank pain, frequency, menstrual problem, pelvic pain and vaginal discharge. Musculoskeletal: Negative for back pain. Neurological: Negative for syncope and headaches. Psychiatric/Behavioral: Negative for behavioral problems. Objective:     /84 (Site: Right Upper Arm, Position: Sitting, Cuff Size: Large Adult)   Ht 5' 6.5\" (1.689 m)   Wt 278 lb 3.2 oz (126.2 kg)   LMP 03/29/2019   Breastfeeding? No   BMI 44.23 kg/m²   Physical Exam   Constitutional: She is oriented to person, place, and time. She appears well-developed and well-nourished. Eyes: Pupils are equal, round, and reactive to light. Neck: No tracheal deviation present. No thyromegaly present. Cardiovascular: Normal rate, regular rhythm and normal heart sounds. Pulmonary/Chest: Effort normal and breath sounds normal. No respiratory distress. Right breast exhibits no inverted nipple.  Left breast exhibits no inverted nipple, no mass, no nipple discharge, no skin change and no tenderness. No breast tenderness, discharge or bleeding. Breasts are symmetrical.   Abdominal: Soft. Bowel sounds are normal. She exhibits no distension and no mass. There is no tenderness. There is no CVA tenderness. Hernia confirmed negative in the right inguinal area and confirmed negative in the left inguinal area. Genitourinary: No breast tenderness, discharge or bleeding. There is no rash or lesion on the right labia. There is no rash or lesion on the left labia. Uterus is not deviated, not enlarged and not fixed. Cervix exhibits no motion tenderness, no discharge and no friability. Right adnexum displays no mass, no tenderness and no fullness. Left adnexum displays no mass, no tenderness and no fullness. No tenderness in the vagina. No vaginal discharge found. Musculoskeletal: She exhibits no edema or tenderness. Lymphadenopathy:     She has no cervical adenopathy. Right: No inguinal adenopathy present. Left: No inguinal adenopathy present. Neurological: She is alert and oriented to person, place, and time. Skin: Skin is warm and dry. Psychiatric: She has a normal mood and affect. Her behavior is normal. Judgment and thought content normal.         Assessment:     1. Encounter for gynecological examination          Plan:   1. Pap collected. Discussed new pap smear guidelines. Desires re-pap in 1 year. 2. Breast self exam reviewed  3. Calcium and Vitamin D dosing reviewed. 4. Seat belt use reviewed  5. Family planning reviewed.   Birth control ocp    Electronicallysigned by Sandra Tucker on 4/8/2019

## 2019-04-11 RX ORDER — ETHYNODIOL DIACETATE AND ETHINYL ESTRADIOL 1 MG-35MCG
1 KIT ORAL DAILY
Qty: 1 PACKET | Refills: 12 | Status: SHIPPED | OUTPATIENT
Start: 2019-04-11 | End: 2019-04-29

## 2019-04-11 NOTE — TELEPHONE ENCOUNTER
Patient not happy that blisovi is no longer covered by her insurance .  read the denial from [facility] to her and advised that formerly [facility] had sent Christen Duarte which was listed on her formulary list. Advised patient to call her insurance if she has questions on coverage as it is possible that their formulary changed since she filled last . Patient verbalized understanding

## 2019-04-11 NOTE — TELEPHONE ENCOUNTER
Left message at [phone] that new rx was sent after [facility] denied norethindrone , any questions please call our office

## 2019-04-11 NOTE — TELEPHONE ENCOUNTER
Received denial from [employer] for norethindrone.  Scanned in to epic with alternatives that are available without PA

## 2019-04-15 DIAGNOSIS — Z01.419 ENCOUNTER FOR GYNECOLOGICAL EXAMINATION: ICD-10-CM

## 2019-10-01 RX ORDER — PNV,CALCIUM 72/IRON/FOLIC ACID 27 MG-1 MG
TABLET ORAL
Qty: 30 TABLET | Refills: 10 | Status: SHIPPED | OUTPATIENT
Start: 2019-10-01 | End: 2020-09-08

## 2019-12-12 ENCOUNTER — OFFICE VISIT (OUTPATIENT)
Dept: FAMILY MEDICINE CLINIC | Age: 39
End: 2019-12-12
Payer: MEDICARE

## 2019-12-12 ENCOUNTER — HOSPITAL ENCOUNTER (OUTPATIENT)
Age: 39
Setting detail: SPECIMEN
Discharge: HOME | End: 2019-12-12
Payer: MEDICARE

## 2019-12-12 VITALS
HEART RATE: 83 BPM | DIASTOLIC BLOOD PRESSURE: 86 MMHG | BODY MASS INDEX: 43.05 KG/M2 | OXYGEN SATURATION: 97 % | TEMPERATURE: 99 F | WEIGHT: 270.8 LBS | SYSTOLIC BLOOD PRESSURE: 122 MMHG

## 2019-12-12 DIAGNOSIS — F41.9 ANXIETY: ICD-10-CM

## 2019-12-12 DIAGNOSIS — Z79.899 MEDICATION MANAGEMENT: ICD-10-CM

## 2019-12-12 DIAGNOSIS — Z76.89 ENCOUNTER TO ESTABLISH CARE: ICD-10-CM

## 2019-12-12 DIAGNOSIS — Z13.29 SCREENING FOR THYROID DISORDER: ICD-10-CM

## 2019-12-12 DIAGNOSIS — F41.9 ANXIETY: Primary | ICD-10-CM

## 2019-12-12 DIAGNOSIS — Z13.220 SCREENING FOR HYPERLIPIDEMIA: ICD-10-CM

## 2019-12-12 LAB
ALBUMIN SERPL-MCNC: 4 G/DL (ref 3.5–5.2)
ALBUMIN/GLOBULIN RATIO: 1.2 (ref 1–2.5)
ALP BLD-CCNC: 57 U/L (ref 35–104)
ALT SERPL-CCNC: 35 U/L (ref 5–33)
ANION GAP SERPL CALCULATED.3IONS-SCNC: 13 MMOL/L (ref 9–17)
AST SERPL-CCNC: 24 U/L
BILIRUB SERPL-MCNC: 0.28 MG/DL (ref 0.3–1.2)
BUN BLDV-MCNC: 12 MG/DL (ref 6–20)
BUN/CREAT BLD: ABNORMAL (ref 9–20)
CALCIUM SERPL-MCNC: 9.2 MG/DL (ref 8.6–10.4)
CHLORIDE BLD-SCNC: 104 MMOL/L (ref 98–107)
CHOLESTEROL, FASTING: 164 MG/DL
CHOLESTEROL/HDL RATIO: 3.5
CO2: 25 MMOL/L (ref 20–31)
CREAT SERPL-MCNC: 0.54 MG/DL (ref 0.5–0.9)
FOLLICLE STIMULATING HORMONE: 4.6 U/L (ref 1.7–21.5)
GFR AFRICAN AMERICAN: >60 ML/MIN
GFR NON-AFRICAN AMERICAN: >60 ML/MIN
GFR SERPL CREATININE-BSD FRML MDRD: ABNORMAL ML/MIN/{1.73_M2}
GFR SERPL CREATININE-BSD FRML MDRD: ABNORMAL ML/MIN/{1.73_M2}
GLUCOSE BLD-MCNC: 88 MG/DL (ref 70–99)
HCT VFR BLD CALC: 45 % (ref 36.3–47.1)
HDLC SERPL-MCNC: 47 MG/DL
HEMOGLOBIN: 14.6 G/DL (ref 11.9–15.1)
LDL CHOLESTEROL: 100 MG/DL (ref 0–130)
LH: 7.3 U/L (ref 1–95.6)
MCH RBC QN AUTO: 29.9 PG (ref 25.2–33.5)
MCHC RBC AUTO-ENTMCNC: 32.4 G/DL (ref 28.4–34.8)
MCV RBC AUTO: 92.2 FL (ref 82.6–102.9)
NRBC AUTOMATED: 0 PER 100 WBC
PDW BLD-RTO: 12.2 % (ref 11.8–14.4)
PLATELET # BLD: 410 K/UL (ref 138–453)
PMV BLD AUTO: 9.3 FL (ref 8.1–13.5)
POTASSIUM SERPL-SCNC: 4.4 MMOL/L (ref 3.7–5.3)
RBC # BLD: 4.88 M/UL (ref 3.95–5.11)
SODIUM BLD-SCNC: 142 MMOL/L (ref 135–144)
TOTAL PROTEIN: 7.4 G/DL (ref 6.4–8.3)
TRIGLYCERIDE, FASTING: 85 MG/DL
TSH SERPL DL<=0.05 MIU/L-ACNC: 2.02 MIU/L (ref 0.3–5)
VLDLC SERPL CALC-MCNC: NORMAL MG/DL (ref 1–30)
WBC # BLD: 6.7 K/UL (ref 3.5–11.3)

## 2019-12-12 PROCEDURE — G8427 DOCREV CUR MEDS BY ELIG CLIN: HCPCS | Performed by: NURSE PRACTITIONER

## 2019-12-12 PROCEDURE — 99203 OFFICE O/P NEW LOW 30 MIN: CPT | Performed by: NURSE PRACTITIONER

## 2019-12-12 PROCEDURE — G8484 FLU IMMUNIZE NO ADMIN: HCPCS | Performed by: NURSE PRACTITIONER

## 2019-12-12 PROCEDURE — G8417 CALC BMI ABV UP PARAM F/U: HCPCS | Performed by: NURSE PRACTITIONER

## 2019-12-12 PROCEDURE — 1036F TOBACCO NON-USER: CPT | Performed by: NURSE PRACTITIONER

## 2019-12-12 ASSESSMENT — ENCOUNTER SYMPTOMS
EYE DISCHARGE: 0
COUGH: 0
SHORTNESS OF BREATH: 0
DIARRHEA: 0
SINUS PRESSURE: 0
BLOOD IN STOOL: 0
ABDOMINAL PAIN: 0
RHINORRHEA: 0
CHEST TIGHTNESS: 0
CONSTIPATION: 0

## 2019-12-12 ASSESSMENT — PATIENT HEALTH QUESTIONNAIRE - PHQ9
SUM OF ALL RESPONSES TO PHQ QUESTIONS 1-9: 2
2. FEELING DOWN, DEPRESSED OR HOPELESS: 1
SUM OF ALL RESPONSES TO PHQ9 QUESTIONS 1 & 2: 2
1. LITTLE INTEREST OR PLEASURE IN DOING THINGS: 1
SUM OF ALL RESPONSES TO PHQ QUESTIONS 1-9: 2

## 2019-12-17 LAB
ESTRADIOL LEVEL: 135 PG/ML
ESTROGEN TOTAL: 242 PG/ML
ESTRONE: 107 PG/ML

## 2019-12-18 ENCOUNTER — TELEPHONE (OUTPATIENT)
Dept: FAMILY MEDICINE CLINIC | Age: 39
End: 2019-12-18

## 2019-12-20 RX ORDER — ESCITALOPRAM OXALATE 10 MG/1
10 TABLET ORAL DAILY
Qty: 30 TABLET | Refills: 3 | Status: SHIPPED | OUTPATIENT
Start: 2019-12-20 | End: 2020-04-17

## 2020-04-16 NOTE — TELEPHONE ENCOUNTER
Last visit: 12/12/2019  With Erin Magana  Last Med refill: 30/2020  Does patient have enough medication for 72 hours: No: only 2 pills left    Next Visit Date:  Future Appointments   Date Time Provider Morales Morgan   4/22/2020 12:50 PM Stacia Shah, APRN - CNP [facility] [address] Maintenance   Topic Date Due    Varicella vaccine (1 of 2 - 2-dose childhood series) 04/07/1982    Cervical cancer screen  04/08/2020    Flu vaccine (Season Ended) 12/12/2020 (Originally 9/1/2020)    Potassium monitoring  12/12/2020    Creatinine monitoring  12/12/2020    DTaP/Tdap/Td vaccine (2 - Td) 11/07/2027    HIV screen  Completed    Hepatitis A vaccine  Aged Out    Hepatitis B vaccine  Aged Out    Hib vaccine  Aged Out    Meningococcal (ACWY) vaccine  Aged Out    Pneumococcal 0-64 years Vaccine  Aged Out       No results found for: LABA1C          ( goal A1C is < 7)   No results found for: LABMICR  LDL Cholesterol (mg/dL)   Date Value   12/12/2019 100   02/20/2012 96       (goal LDL is <100)   AST (U/L)   Date Value   12/12/2019 24     ALT (U/L)   Date Value   12/12/2019 35 (H)     BUN (mg/dL)   Date Value   12/12/2019 12     BP Readings from Last 3 Encounters:   12/12/19 122/86   04/08/19 108/84   03/30/18 116/84          (goal 120/80)    All Future Testing planned in [facility]  Lab Frequency Next Occurrence               Patient Active Problem List:     Hypertension     PCOS (polycystic ovarian syndrome)     History of ectopic pregnancy     Abnormal first trimester screen     Chronic hypertension in pregnancy     AMA (advanced maternal age) multigravida 35+     Increased nuchal translucency space on fetal ultrasound     Obesity complicating pregnancy     Vaginal bleeding     High-risk pregnancy     Abnormal placenta affecting management of mother in third trimester     34 weeks gestation of pregnancy     Chronic hypertension with exacerbation during pregnancy in third trimester     Polyhydramnios,

## 2020-04-17 RX ORDER — ESCITALOPRAM OXALATE 10 MG/1
10 TABLET ORAL DAILY
Qty: 30 TABLET | Refills: 3 | Status: SHIPPED | OUTPATIENT
Start: 2020-04-17 | End: 2020-08-11 | Stop reason: ALTCHOICE

## 2020-04-17 RX ORDER — ESCITALOPRAM OXALATE 10 MG/1
TABLET ORAL
Qty: 30 TABLET | Refills: 5 | Status: SHIPPED | OUTPATIENT
Start: 2020-04-17 | End: 2020-04-17

## 2020-04-17 NOTE — TELEPHONE ENCOUNTER
Last visit: 12/12/19  Last Med refill: 12/20/19  Does patient have enough medication for 72 hours: Yes    Next Visit Date:  Future Appointments   Date Time Provider Morales Morgan   4/22/2020 12:50 PM TORIBIO Crockett CNP FP [address] Maintenance   Topic Date Due    Varicella vaccine (1 of 2 - 2-dose childhood series) 04/07/1982    Cervical cancer screen  04/08/2020    Flu vaccine (Season Ended) 12/12/2020 (Originally 9/1/2020)    Potassium monitoring  12/12/2020    Creatinine monitoring  12/12/2020    DTaP/Tdap/Td vaccine (2 - Td) 11/07/2027    HIV screen  Completed    Hepatitis A vaccine  Aged Out    Hepatitis B vaccine  Aged Out    Hib vaccine  Aged Out    Meningococcal (ACWY) vaccine  Aged Out    Pneumococcal 0-64 years Vaccine  Aged Out       No results found for: LABA1C          ( goal A1C is < 7)   No results found for: LABMICR  LDL Cholesterol (mg/dL)   Date Value   12/12/2019 100   02/20/2012 96       (goal LDL is <100)   AST (U/L)   Date Value   12/12/2019 24     ALT (U/L)   Date Value   12/12/2019 35 (H)     BUN (mg/dL)   Date Value   12/12/2019 12     BP Readings from Last 3 Encounters:   12/12/19 122/86   04/08/19 108/84   03/30/18 116/84          (goal 120/80)    All Future Testing planned in [facility]  Lab Frequency Next Occurrence               Patient Active Problem List:     Hypertension     PCOS (polycystic ovarian syndrome)     History of ectopic pregnancy     Abnormal first trimester screen     Chronic hypertension in pregnancy     AMA (advanced maternal age) multigravida 35+     Increased nuchal translucency space on fetal ultrasound     Obesity complicating pregnancy     Vaginal bleeding     High-risk pregnancy     Abnormal placenta affecting management of mother in third trimester     34 weeks gestation of pregnancy     Chronic hypertension with exacerbation during pregnancy in third trimester     Polyhydramnios, antepartum complication     LGA (large for gestational age) fetus affecting mother, antepartum     Elderly multigravida in third trimester     Secondary hypertension

## 2020-04-22 ENCOUNTER — OFFICE VISIT (OUTPATIENT)
Dept: FAMILY MEDICINE CLINIC | Age: 40
End: 2020-04-22
Payer: MEDICARE

## 2020-04-22 VITALS
BODY MASS INDEX: 42.28 KG/M2 | TEMPERATURE: 98.1 F | DIASTOLIC BLOOD PRESSURE: 78 MMHG | WEIGHT: 269.4 LBS | HEIGHT: 67 IN | SYSTOLIC BLOOD PRESSURE: 110 MMHG | HEART RATE: 82 BPM | OXYGEN SATURATION: 99 %

## 2020-04-22 PROBLEM — O40.9XX0 POLYHYDRAMNIOS, ANTEPARTUM COMPLICATION: Status: RESOLVED | Noted: 2017-10-17 | Resolved: 2020-04-22

## 2020-04-22 PROBLEM — O28.3 INCREASED NUCHAL TRANSLUCENCY SPACE ON FETAL ULTRASOUND: Status: RESOLVED | Noted: 2017-05-09 | Resolved: 2020-04-22

## 2020-04-22 PROBLEM — O09.90 HIGH-RISK PREGNANCY: Status: RESOLVED | Noted: 2017-09-26 | Resolved: 2020-04-22

## 2020-04-22 PROBLEM — F34.1 DYSTHYMIA: Status: ACTIVE | Noted: 2020-04-22

## 2020-04-22 PROBLEM — O43.93 ABNORMAL PLACENTA AFFECTING MANAGEMENT OF MOTHER IN THIRD TRIMESTER: Status: RESOLVED | Noted: 2017-10-02 | Resolved: 2020-04-22

## 2020-04-22 PROBLEM — O09.529 AMA (ADVANCED MATERNAL AGE) MULTIGRAVIDA 35+: Status: RESOLVED | Noted: 2017-05-09 | Resolved: 2020-04-22

## 2020-04-22 PROBLEM — O36.60X0 LGA (LARGE FOR GESTATIONAL AGE) FETUS AFFECTING MOTHER, ANTEPARTUM: Status: RESOLVED | Noted: 2017-10-17 | Resolved: 2020-04-22

## 2020-04-22 PROBLEM — N93.9 VAGINAL BLEEDING: Status: RESOLVED | Noted: 2017-09-11 | Resolved: 2020-04-22

## 2020-04-22 PROCEDURE — G8417 CALC BMI ABV UP PARAM F/U: HCPCS | Performed by: NURSE PRACTITIONER

## 2020-04-22 PROCEDURE — 99213 OFFICE O/P EST LOW 20 MIN: CPT | Performed by: NURSE PRACTITIONER

## 2020-04-22 PROCEDURE — G8427 DOCREV CUR MEDS BY ELIG CLIN: HCPCS | Performed by: NURSE PRACTITIONER

## 2020-04-22 PROCEDURE — 1036F TOBACCO NON-USER: CPT | Performed by: NURSE PRACTITIONER

## 2020-04-22 RX ORDER — BUPROPION HYDROCHLORIDE 150 MG/1
150 TABLET ORAL EVERY MORNING
Qty: 30 TABLET | Refills: 2 | Status: SHIPPED | OUTPATIENT
Start: 2020-04-22 | End: 2020-07-20

## 2020-04-22 ASSESSMENT — PATIENT HEALTH QUESTIONNAIRE - PHQ9
SUM OF ALL RESPONSES TO PHQ QUESTIONS 1-9: 2
SUM OF ALL RESPONSES TO PHQ QUESTIONS 1-9: 2
SUM OF ALL RESPONSES TO PHQ9 QUESTIONS 1 & 2: 2
1. LITTLE INTEREST OR PLEASURE IN DOING THINGS: 1
2. FEELING DOWN, DEPRESSED OR HOPELESS: 1

## 2020-04-22 ASSESSMENT — ENCOUNTER SYMPTOMS
RESPIRATORY NEGATIVE: 1
COUGH: 0

## 2020-07-20 RX ORDER — NORETHINDRONE ACETATE AND ETHINYL ESTRADIOL 1.5-30(21)
KIT ORAL
Qty: 28 TABLET | Refills: 1 | Status: SHIPPED | OUTPATIENT
Start: 2020-07-20 | End: 2020-08-11 | Stop reason: SDUPTHER

## 2020-08-11 ENCOUNTER — OFFICE VISIT (OUTPATIENT)
Dept: OBGYN CLINIC | Age: 40
End: 2020-08-11
Payer: MEDICARE

## 2020-08-11 VITALS
HEIGHT: 67 IN | DIASTOLIC BLOOD PRESSURE: 84 MMHG | BODY MASS INDEX: 43.98 KG/M2 | WEIGHT: 280.2 LBS | SYSTOLIC BLOOD PRESSURE: 102 MMHG

## 2020-08-11 LAB — PAP SMEAR: NORMAL

## 2020-08-11 PROCEDURE — 99395 PREV VISIT EST AGE 18-39: CPT | Performed by: NURSE PRACTITIONER

## 2020-08-11 RX ORDER — NORETHINDRONE ACETATE AND ETHINYL ESTRADIOL 1.5-30(21)
KIT ORAL
Qty: 28 TABLET | Refills: 12 | Status: SHIPPED | OUTPATIENT
Start: 2020-08-11 | End: 2021-08-16 | Stop reason: SDUPTHER

## 2020-08-11 ASSESSMENT — ENCOUNTER SYMPTOMS
COUGH: 0
CONSTIPATION: 0
DIARRHEA: 0
SHORTNESS OF BREATH: 0
ABDOMINAL PAIN: 0
BACK PAIN: 0
ABDOMINAL DISTENTION: 0

## 2020-08-11 NOTE — PROGRESS NOTES
HPI:     Vince John a 45 y.o. female who presents today for:  Chief Complaint   Patient presents with    Annual Exam     last pap 4/8/19-WNL     Medication Refill     refill bcp        HPI  Here for annual exam. Works in day care. Has 3 children- 14/5/2. Working on healthy eating and adding activity. GYNECOLOGICHISTORY:  MenstrualHistory: Patient's last menstrual period was 07/14/2020. Sexually Transmitted Infections: No  History of Ectopic Pregnancy:No  Menarche: 12  Cycles q 28 Days  Durationof cycles: 4-5  Dysmenorrhea: none  Sexually active: yes  Dyspareunia: none    Current Outpatient Medications   Medication Sig Dispense Refill    buPROPion HCl (WELLBUTRIN PO) Take by mouth      BLISOVI FE 1.5/30 1.5-30 MG-MCG tablet TAKE ONE TABLET BY MOUTH DAILY 28 tablet 1    Prenatal Vit-Fe Fumarate-FA (PREPLUS) 27-1 MG TABS TAKE ONE TABLET BY MOUTH DAILY 30 tablet 10    cetirizine (ZYRTEC) 10 MG tablet Take 10 mg by mouth daily Indications: OTC        No current facility-administered medications for this visit.       Allergies   Allergen Reactions    Codeine Nausea And Vomiting    Sulfa Antibiotics Rash       Past Medical History:   Diagnosis Date    Anxiety     Back pain     Chlamydia     Depression 2013    on antidepressant loss of ectopic pregnancy    Herpes simplex without mention of complication     History of blood transfusion 2013    transfusion with ectopic pregnancy    Hypertension     No problem since 1/2015    Infertility, female     PCOS (polycystic ovarian syndrome)     Seasonal allergies     Trauma 2015    Uterine disorder 2006    septation noted, but not noted on any subsequent ultrasound     Denies family history of breast, ovarian, uterus, colon CA     Past Surgical History:   Procedure Laterality Date    ECTOPIC PREGNANCY SURGERY  7/2013    Left fallopian tube removed    NASAL SEPTUM SURGERY  11/12/12    with turbinate reduction and polypectomy, ENT- dr. Miguel Newman TONSILLECTOMY       Family History   Problem Relation Age of Onset    Diabetes Paternal Grandfather     Heart Disease Paternal Grandfather     Thyroid Disease Maternal Grandmother     Diabetes Maternal Grandfather     Hypertension Maternal Grandfather     Hypertension Father     Heart Surgery Father     Thyroid Disease Mother     High Blood Pressure Mother     No Known Problems Paternal Grandmother     Breast Cancer Maternal Aunt         in her 48\"s     Social History     Tobacco Use    Smoking status: Never Smoker    Smokeless tobacco: Never Used   Substance Use Topics    Alcohol use: No        Subjective:      Review of Systems   Constitutional: Negative for appetite change and fatigue. HENT: Negative for congestion and hearing loss. Eyes: Negative for visual disturbance. Respiratory: Negative for cough and shortness of breath. Cardiovascular: Negative for chest pain and palpitations. Gastrointestinal: Negative for abdominal distention, abdominal pain, constipation and diarrhea. Genitourinary: Negative for flank pain, frequency, menstrual problem, pelvic pain and vaginal discharge. Musculoskeletal: Negative for back pain. Neurological: Negative for syncope and headaches. Psychiatric/Behavioral: Negative for behavioral problems. Objective:     /84 (Site: Right Upper Arm, Position: Sitting, Cuff Size: Large Adult)   Ht 5' 7\" (1.702 m)   Wt 280 lb 3.2 oz (127.1 kg)   LMP 07/14/2020   Breastfeeding No   BMI 43.89 kg/m²   Physical Exam  Constitutional:       Appearance: She is well-developed. Eyes:      Pupils: Pupils are equal, round, and reactive to light. Neck:      Thyroid: No thyromegaly. Trachea: No tracheal deviation. Cardiovascular:      Rate and Rhythm: Normal rate and regular rhythm. Heart sounds: Normal heart sounds. Pulmonary:      Effort: Pulmonary effort is normal. No respiratory distress. Breath sounds: Normal breath sounds. Chest:      Breasts: Breasts are symmetrical.         Right: No inverted nipple. Left: No inverted nipple, mass, nipple discharge, skin change or tenderness. Abdominal:      General: Bowel sounds are normal. There is no distension. Palpations: Abdomen is soft. There is no mass. Tenderness: There is no abdominal tenderness. Hernia: There is no hernia in the left inguinal area. Genitourinary:     Labia:         Right: No rash or lesion. Left: No rash or lesion. Vagina: Vaginal discharge (moderate) present. No tenderness. Cervix: No cervical motion tenderness, discharge or friability. Uterus: Not deviated, not enlarged and not fixed. Adnexa:         Right: No mass, tenderness or fullness. Left: No mass, tenderness or fullness. Musculoskeletal:         General: No tenderness. Lymphadenopathy:      Cervical: No cervical adenopathy. Skin:     General: Skin is warm and dry. Neurological:      Mental Status: She is alert and oriented to person, place, and time. Psychiatric:         Behavior: Behavior normal.         Thought Content: Thought content normal.         Judgment: Judgment normal.           Assessment:     1. Encounter for gynecological examination    2. Encounter for screening mammogram for breast cancer          Plan:   1. Pap collected. Discussed new pap smear guidelines. Desires re-pap in 1 year. 2. Breast self exam reviewed  3. Calcium and Vitamin D dosing reviewed. 4. Seat belt use reviewed  5. Family planning reviewed.   Birth control ocp  Affirm    Electronicallysigned by Brad Browning on 8/11/2020

## 2020-08-12 RX ORDER — METRONIDAZOLE 500 MG/1
500 TABLET ORAL 2 TIMES DAILY
Qty: 14 TABLET | Refills: 0 | Status: SHIPPED | OUTPATIENT
Start: 2020-08-12 | End: 2020-08-19

## 2020-09-08 RX ORDER — PNV,CALCIUM 72/IRON/FOLIC ACID 27 MG-1 MG
TABLET ORAL
Qty: 30 TABLET | Refills: 9 | Status: SHIPPED | OUTPATIENT
Start: 2020-09-08 | End: 2021-07-21

## 2020-12-10 ENCOUNTER — HOSPITAL ENCOUNTER (OUTPATIENT)
Age: 40
Setting detail: SPECIMEN
Discharge: HOME | End: 2020-12-10
Payer: MEDICARE

## 2020-12-10 ENCOUNTER — OFFICE VISIT (OUTPATIENT)
Dept: OBGYN CLINIC | Age: 40
End: 2020-12-10
Payer: MEDICARE

## 2020-12-10 VITALS
SYSTOLIC BLOOD PRESSURE: 114 MMHG | HEIGHT: 67 IN | DIASTOLIC BLOOD PRESSURE: 84 MMHG | WEIGHT: 275.6 LBS | BODY MASS INDEX: 43.26 KG/M2 | TEMPERATURE: 98.3 F

## 2020-12-10 LAB
CONTROL: PRESENT
DIRECT EXAM: NORMAL
Lab: NORMAL
PREGNANCY TEST URINE, POC: NEGATIVE
SPECIMEN DESCRIPTION: NORMAL

## 2020-12-10 PROCEDURE — 81025 URINE PREGNANCY TEST: CPT | Performed by: NURSE PRACTITIONER

## 2020-12-10 PROCEDURE — G8484 FLU IMMUNIZE NO ADMIN: HCPCS | Performed by: NURSE PRACTITIONER

## 2020-12-10 PROCEDURE — 1036F TOBACCO NON-USER: CPT | Performed by: NURSE PRACTITIONER

## 2020-12-10 PROCEDURE — G8417 CALC BMI ABV UP PARAM F/U: HCPCS | Performed by: NURSE PRACTITIONER

## 2020-12-10 PROCEDURE — 99213 OFFICE O/P EST LOW 20 MIN: CPT | Performed by: NURSE PRACTITIONER

## 2020-12-10 PROCEDURE — G8427 DOCREV CUR MEDS BY ELIG CLIN: HCPCS | Performed by: NURSE PRACTITIONER

## 2020-12-10 NOTE — PROGRESS NOTES
Chaperone for Intimate Exam   Chaperone was offered as part of the rooming process. Patient declined and agrees to continue with exam without a chaperone.    Chaperone: NONE

## 2020-12-10 NOTE — PROGRESS NOTES
Vaginitis: Patient complains of an abnormal vaginal discharge for 3 days. Vaginal symptoms include local irritation. Vulvar symptoms include local irritation and vulvar itching. STI Risk: Possible STD exposure (recently broke up w/partner- last unprotected intercourse was Sunday)  Discharge described as: yellow and mucoid . Menstrual pattern: She had been bleeding irregularly. Contraception: OCP (estrogen/progesterone)   Periods have been irregular. Last \"normal period\" was in October. No period in November. Not typical to miss periods. Denies missing any pills. Temp 98.3 °F (36.8 °C)   Ht 5' 7\" (1.702 m)   BMI 43.89 kg/m²     ALLERGIES:  Codeine, sulfa  O-  Physical Exam  Genitourinary:     General: Normal vulva. Labia:         Right: No rash, tenderness, lesion or injury. Left: No rash, tenderness, lesion or injury. Vagina: Normal. No foreign body. No vaginal discharge, erythema, tenderness, bleeding or lesions. Moderate amount white/yellow discharge     A. Vaginitis  options. Irregular periods  P. Affirm collected and sent to lab  Will treat results accordingly  She was also counseled on her preventative health maintenance recommendations and follow-up. Monitor/track cycles.  RTO 3 months if pattern continues  RTO annual exam and PRN

## 2020-12-11 LAB
C TRACH DNA GENITAL QL NAA+PROBE: NEGATIVE
N. GONORRHOEAE DNA: NEGATIVE
SPECIMEN DESCRIPTION: NORMAL

## 2021-03-30 ENCOUNTER — OFFICE VISIT (OUTPATIENT)
Dept: FAMILY MEDICINE CLINIC | Age: 41
End: 2021-03-30
Payer: MEDICARE

## 2021-03-30 VITALS
TEMPERATURE: 97.8 F | HEIGHT: 67 IN | DIASTOLIC BLOOD PRESSURE: 74 MMHG | HEART RATE: 92 BPM | WEIGHT: 271.2 LBS | SYSTOLIC BLOOD PRESSURE: 122 MMHG | BODY MASS INDEX: 42.56 KG/M2 | OXYGEN SATURATION: 98 %

## 2021-03-30 DIAGNOSIS — R55 NEAR SYNCOPE: Primary | ICD-10-CM

## 2021-03-30 DIAGNOSIS — Z11.59 ENCOUNTER FOR HEPATITIS C SCREENING TEST FOR LOW RISK PATIENT: ICD-10-CM

## 2021-03-30 DIAGNOSIS — Z13.1 SCREENING FOR DIABETES MELLITUS: ICD-10-CM

## 2021-03-30 DIAGNOSIS — E66.01 CLASS 3 SEVERE OBESITY WITHOUT SERIOUS COMORBIDITY WITH BODY MASS INDEX (BMI) OF 40.0 TO 44.9 IN ADULT, UNSPECIFIED OBESITY TYPE (HCC): ICD-10-CM

## 2021-03-30 DIAGNOSIS — Z78.9 UNKNOWN VARICELLA VACCINATION STATUS: ICD-10-CM

## 2021-03-30 DIAGNOSIS — Z13.0 SCREENING FOR DEFICIENCY ANEMIA: ICD-10-CM

## 2021-03-30 PROBLEM — E66.813 CLASS 3 SEVERE OBESITY WITHOUT SERIOUS COMORBIDITY WITH BODY MASS INDEX (BMI) OF 40.0 TO 44.9 IN ADULT: Status: ACTIVE | Noted: 2021-03-30

## 2021-03-30 PROCEDURE — 1036F TOBACCO NON-USER: CPT | Performed by: NURSE PRACTITIONER

## 2021-03-30 PROCEDURE — G8417 CALC BMI ABV UP PARAM F/U: HCPCS | Performed by: NURSE PRACTITIONER

## 2021-03-30 PROCEDURE — G8427 DOCREV CUR MEDS BY ELIG CLIN: HCPCS | Performed by: NURSE PRACTITIONER

## 2021-03-30 PROCEDURE — G8484 FLU IMMUNIZE NO ADMIN: HCPCS | Performed by: NURSE PRACTITIONER

## 2021-03-30 PROCEDURE — 99214 OFFICE O/P EST MOD 30 MIN: CPT | Performed by: NURSE PRACTITIONER

## 2021-03-30 ASSESSMENT — ENCOUNTER SYMPTOMS
RESPIRATORY NEGATIVE: 1
COUGH: 0

## 2021-03-30 ASSESSMENT — PATIENT HEALTH QUESTIONNAIRE - PHQ9: 2. FEELING DOWN, DEPRESSED OR HOPELESS: 1

## 2021-03-30 NOTE — PROGRESS NOTES
[facility] [address] [facility]  [address] [address]  Dept: [phone]  Dept Fax: [phone]    Perlita Mtz is a 45 y.o. female who presents today for her medical conditions/complaintsas noted below. Perlita Mtz is c/o of   Chief Complaint   Patient presents with    Loss of Consciousness     THURSDAY OF LAST WEEK ONLY ONCE         HPI:     HPI  [place] is here today after having a near syncopal episode last week when she was in the ortho MD with her daughter last week. She began feeling hot, nauseated, faint, sweaty. She did not pass out. Was not dehydrated and did have something to eat prior to this. Denies chest pain/shortness of breath at the time. Obesity-she struggles with wt loss. She was successful in losing weight once when on metformin and followed a PCOS diet. She has exercised without weight loss. She struggles to afford fresher foods. She is typically making more convenient foods for herself and children. She has considered bariatric surgery but is hesitant.        No results found for: LABA1C          ( goal A1Cis < 7)   No results found for: LABMICR  LDL Cholesterol (mg/dL)   Date Value   12/12/2019 100   02/20/2012 96       (goal LDL is <100)   AST (U/L)   Date Value   12/12/2019 24     ALT (U/L)   Date Value   12/12/2019 35 (H)     BUN (mg/dL)   Date Value   12/12/2019 12     BP Readings from Last 3 Encounters:   03/30/21 122/74   12/10/20 114/84   08/11/20 102/84          (goal 120/80)    Past Medical History:   Diagnosis Date    Anxiety     Back pain     Chlamydia     Depression 2013    on antidepressant loss of ectopic pregnancy    Herpes simplex without mention of complication     History of blood transfusion 2013    transfusion with ectopic pregnancy    Hypertension     No problem since 1/2015    Infertility, female     PCOS (polycystic ovarian syndrome)     Seasonal allergies     Trauma 2015    Uterine comorbidity with body mass index (BMI) of 40.0 to 44.9 in adult, unspecified obesity type ([facility] [place] [facility]  Lipid Panel             Plan:      No follow-ups on file. Orders Placed This Encounter   Procedures    Hepatitis C Antibody     Standing Status:   Future     Standing Expiration Date:   3/30/2022    Varicella Zoster Antibody, IgG     Standing Status:   Future     Standing Expiration Date:   3/30/2022    Basic Metabolic Panel     Standing Status:   Future     Standing Expiration Date:   3/30/2022    CBC Auto Differential     Standing Status:   Future     Standing Expiration Date:   3/30/2022    Hemoglobin A1C     Standing Status:   Future     Standing Expiration Date:   3/30/2022    Lipid Panel     Standing Status:   Future     Standing Expiration Date:   3/30/2022     Order Specific Question:   Is Patient Fasting?/# of Hours     Answer:   yes   1. Near syncope  ? Low blood sugar, dehydration? Vagal reaction  Encouraged to drink at least 64 oz water daily  Lab pending  2. Class 3 severe obesity without serious comorbidity with body mass index (BMI) of 40.0 to 44.9 in adult, unspecified obesity type ([facility] [place] [facility]  Discussion of options for wt loss: adipex, diet and exercise, bariatrics. Will send referral for bariatric consult so she can have all options presented to her and go from there. - Lipid Panel; Future  - Helgaerik Crisostomo Son, DO, Weight Management and [facility]    3. Encounter for hepatitis C screening test for low risk patient    - Hepatitis C Antibody; Future    4. Unknown varicella vaccination status    - Varicella Zoster Antibody, IgG; Future    5. Screening for diabetes mellitus    - Basic Metabolic Panel; Future  - Hemoglobin A1C; Future    6. Screening for deficiency anemia    - CBC Auto Differential; Future      No orders of the defined types were placed in this encounter. Patient given educational materials - see patient instructions. Discussed use, benefit, and side effects of

## 2021-04-03 ENCOUNTER — HOSPITAL ENCOUNTER (OUTPATIENT)
Age: 41
Discharge: HOME | End: 2021-04-03
Payer: MEDICARE

## 2021-04-03 DIAGNOSIS — E66.01 CLASS 3 SEVERE OBESITY WITHOUT SERIOUS COMORBIDITY WITH BODY MASS INDEX (BMI) OF 40.0 TO 44.9 IN ADULT, UNSPECIFIED OBESITY TYPE (HCC): ICD-10-CM

## 2021-04-03 DIAGNOSIS — Z11.59 ENCOUNTER FOR HEPATITIS C SCREENING TEST FOR LOW RISK PATIENT: ICD-10-CM

## 2021-04-03 DIAGNOSIS — Z13.1 SCREENING FOR DIABETES MELLITUS: ICD-10-CM

## 2021-04-03 DIAGNOSIS — Z78.9 UNKNOWN VARICELLA VACCINATION STATUS: ICD-10-CM

## 2021-04-03 DIAGNOSIS — Z13.0 SCREENING FOR DEFICIENCY ANEMIA: ICD-10-CM

## 2021-04-03 LAB
ABSOLUTE EOS #: 0.18 K/UL (ref 0–0.44)
ABSOLUTE IMMATURE GRANULOCYTE: <0.03 K/UL (ref 0–0.3)
ABSOLUTE LYMPH #: 1.82 K/UL (ref 1.1–3.7)
ABSOLUTE MONO #: 0.62 K/UL (ref 0.1–1.2)
ANION GAP SERPL CALCULATED.3IONS-SCNC: 11 MMOL/L (ref 9–17)
BASOPHILS # BLD: 1 % (ref 0–2)
BASOPHILS ABSOLUTE: 0.05 K/UL (ref 0–0.2)
BUN BLDV-MCNC: 9 MG/DL (ref 6–20)
BUN/CREAT BLD: ABNORMAL (ref 9–20)
CALCIUM SERPL-MCNC: 8.4 MG/DL (ref 8.6–10.4)
CHLORIDE BLD-SCNC: 105 MMOL/L (ref 98–107)
CHOLESTEROL/HDL RATIO: 3.3
CHOLESTEROL: 150 MG/DL
CO2: 22 MMOL/L (ref 20–31)
CREAT SERPL-MCNC: 0.52 MG/DL (ref 0.5–0.9)
DIFFERENTIAL TYPE: NORMAL
EOSINOPHILS RELATIVE PERCENT: 3 % (ref 1–4)
GFR AFRICAN AMERICAN: >60 ML/MIN
GFR NON-AFRICAN AMERICAN: >60 ML/MIN
GFR SERPL CREATININE-BSD FRML MDRD: ABNORMAL ML/MIN/{1.73_M2}
GFR SERPL CREATININE-BSD FRML MDRD: ABNORMAL ML/MIN/{1.73_M2}
GLUCOSE BLD-MCNC: 84 MG/DL (ref 70–99)
HCT VFR BLD CALC: 43.8 % (ref 36.3–47.1)
HDLC SERPL-MCNC: 46 MG/DL
HEMOGLOBIN: 14.2 G/DL (ref 11.9–15.1)
HEPATITIS C ANTIBODY: NONREACTIVE
IMMATURE GRANULOCYTES: 0 %
LDL CHOLESTEROL: 88 MG/DL (ref 0–130)
LYMPHOCYTES # BLD: 31 % (ref 24–43)
MCH RBC QN AUTO: 30 PG (ref 25.2–33.5)
MCHC RBC AUTO-ENTMCNC: 32.4 G/DL (ref 28.4–34.8)
MCV RBC AUTO: 92.6 FL (ref 82.6–102.9)
MONOCYTES # BLD: 11 % (ref 3–12)
NRBC AUTOMATED: 0 PER 100 WBC
PDW BLD-RTO: 12 % (ref 11.8–14.4)
PLATELET # BLD: 343 K/UL (ref 138–453)
PLATELET ESTIMATE: NORMAL
PMV BLD AUTO: 9.4 FL (ref 8.1–13.5)
POTASSIUM SERPL-SCNC: 4.1 MMOL/L (ref 3.7–5.3)
RBC # BLD: 4.73 M/UL (ref 3.95–5.11)
RBC # BLD: NORMAL 10*6/UL
SEG NEUTROPHILS: 54 % (ref 36–65)
SEGMENTED NEUTROPHILS ABSOLUTE COUNT: 3.13 K/UL (ref 1.5–8.1)
SODIUM BLD-SCNC: 138 MMOL/L (ref 135–144)
TRIGL SERPL-MCNC: 80 MG/DL
VLDLC SERPL CALC-MCNC: NORMAL MG/DL (ref 1–30)
WBC # BLD: 5.8 K/UL (ref 3.5–11.3)
WBC # BLD: NORMAL 10*3/UL

## 2021-04-03 PROCEDURE — 86803 HEPATITIS C AB TEST: CPT

## 2021-04-03 PROCEDURE — 80061 LIPID PANEL: CPT

## 2021-04-03 PROCEDURE — 36415 COLL VENOUS BLD VENIPUNCTURE: CPT

## 2021-04-03 PROCEDURE — 83036 HEMOGLOBIN GLYCOSYLATED A1C: CPT

## 2021-04-03 PROCEDURE — 80048 BASIC METABOLIC PNL TOTAL CA: CPT

## 2021-04-03 PROCEDURE — 85025 COMPLETE CBC W/AUTO DIFF WBC: CPT

## 2021-04-03 PROCEDURE — 86787 VARICELLA-ZOSTER ANTIBODY: CPT

## 2021-04-05 LAB — VZV IGG SER QL IA: 1.82

## 2021-04-06 LAB
ESTIMATED AVERAGE GLUCOSE: 108 MG/DL
HBA1C MFR BLD: 5.4 % (ref 4–6)

## 2021-06-04 SDOH — ECONOMIC STABILITY: TRANSPORTATION INSECURITY
IN THE PAST 12 MONTHS, HAS LACK OF TRANSPORTATION KEPT YOU FROM MEETINGS, WORK, OR FROM GETTING THINGS NEEDED FOR DAILY LIVING?: NO

## 2021-06-04 SDOH — ECONOMIC STABILITY: TRANSPORTATION INSECURITY
IN THE PAST 12 MONTHS, HAS THE LACK OF TRANSPORTATION KEPT YOU FROM MEDICAL APPOINTMENTS OR FROM GETTING MEDICATIONS?: NO

## 2021-06-04 SDOH — ECONOMIC STABILITY: FOOD INSECURITY: WITHIN THE PAST 12 MONTHS, YOU WORRIED THAT YOUR FOOD WOULD RUN OUT BEFORE YOU GOT MONEY TO BUY MORE.: NEVER TRUE

## 2021-06-04 SDOH — ECONOMIC STABILITY: FOOD INSECURITY: WITHIN THE PAST 12 MONTHS, THE FOOD YOU BOUGHT JUST DIDN'T LAST AND YOU DIDN'T HAVE MONEY TO GET MORE.: NEVER TRUE

## 2021-06-04 ASSESSMENT — PATIENT HEALTH QUESTIONNAIRE - PHQ9
SUM OF ALL RESPONSES TO PHQ QUESTIONS 1-9: 0
1. LITTLE INTEREST OR PLEASURE IN DOING THINGS: 0
SUM OF ALL RESPONSES TO PHQ QUESTIONS 1-9: 0
SUM OF ALL RESPONSES TO PHQ QUESTIONS 1-9: 0
2. FEELING DOWN, DEPRESSED OR HOPELESS: 0
SUM OF ALL RESPONSES TO PHQ9 QUESTIONS 1 & 2: 0

## 2021-06-04 ASSESSMENT — SOCIAL DETERMINANTS OF HEALTH (SDOH): HOW HARD IS IT FOR YOU TO PAY FOR THE VERY BASICS LIKE FOOD, HOUSING, MEDICAL CARE, AND HEATING?: NOT HARD AT ALL

## 2021-06-04 NOTE — PROGRESS NOTES
Visit Information    Have you changed or started any medications since your last visit including any over-the-counter medicines, vitamins, or herbal medicines? no   Are you having any side effects from any of your medications? -  no  Have you stopped taking any of your medications? Is so, why? -  no    Have you seen any other physician or provider since your last visit? No  Have you had any other diagnostic tests since your last visit? No  Have you been seen in the emergency room and/or had an admission to a hospital since we last saw you? No  Have you had your routine dental cleaning in the past 6 months? no    Have you activated your [employer] account? If not, what are your barriers?  Yes     Patient Care Team:  TORIBIO Donovan CNP as PCP - General  TORIBIO Donovan CNP as PCP - [facility]  Farrukh Deng MD as Obstetrician (Perinatology)    Medical History Review  Past Medical, Family, and Social History reviewed and does contribute to the patient presenting condition    Health Maintenance   Topic Date Due    COVID-19 Vaccine (1) Never done    Cervical cancer screen  08/11/2021    Flu vaccine (Season Ended) 09/01/2021    Lipid screen  04/03/2026    DTaP/Tdap/Td vaccine (2 - Td or Tdap) 11/07/2027    Hepatitis C screen  Completed    HIV screen  Completed    Hepatitis A vaccine  Aged Out    Hepatitis B vaccine  Aged Out    Hib vaccine  Aged Out    Meningococcal (ACWY) vaccine  Aged Out    Pneumococcal 0-64 years Vaccine  Aged Out    Varicella vaccine  Discontinued

## 2021-06-07 ENCOUNTER — TELEMEDICINE (OUTPATIENT)
Dept: FAMILY MEDICINE CLINIC | Age: 41
End: 2021-06-07
Payer: MEDICARE

## 2021-06-07 DIAGNOSIS — E66.01 CLASS 3 SEVERE OBESITY WITHOUT SERIOUS COMORBIDITY WITH BODY MASS INDEX (BMI) OF 40.0 TO 44.9 IN ADULT, UNSPECIFIED OBESITY TYPE (HCC): ICD-10-CM

## 2021-06-07 DIAGNOSIS — R55 NEAR SYNCOPE: Primary | ICD-10-CM

## 2021-06-07 PROCEDURE — 99212 OFFICE O/P EST SF 10 MIN: CPT | Performed by: NURSE PRACTITIONER

## 2021-06-07 PROCEDURE — G8427 DOCREV CUR MEDS BY ELIG CLIN: HCPCS | Performed by: NURSE PRACTITIONER

## 2021-06-07 ASSESSMENT — ENCOUNTER SYMPTOMS
COUGH: 0
RESPIRATORY NEGATIVE: 1

## 2021-06-07 NOTE — PROGRESS NOTES
CNP   cetirizine (ZYRTEC) 10 MG tablet Take 10 mg by mouth daily Indications: OTC  Yes Historical Provider, MD       Social History     Tobacco Use    Smoking status: Never Smoker    Smokeless tobacco: Never Used   Vaping Use    Vaping Use: Never used   Substance Use Topics    Alcohol use: No    Drug use: No          PHYSICAL EXAMINATION:    Vital Signs: (As obtained by patient/caregiver or practitioner observation)    Patient-Reported Vitals 6/4/2021   Patient-Reported Weight 271   Patient-Reported Height 5 7                 Constitutional: [x] Appears well-developed and well-nourished [x] No apparent distress      [] Abnormal-       Mental status  [x] Alert and awake  [x] Oriented to person/place/time [x]Able to follow commands      Eyes:  EOM    [x]  Normal  [] Abnormal-  Sclera  [x]  Normal  [] Abnormal -         Discharge [x]  None visible  [] Abnormal -    HENT:   [x] Normocephalic, atraumatic.   [] Abnormal   [x] Mouth/Throat: Mucous membranes are moist.     External Ears [x] Normal  [] Abnormal-     Neck: [x] No visualized mass     Pulmonary/Chest: [x] Respiratory effort normal.  [x] No visualized signs of difficulty breathing or respiratory distress        [] Abnormal        Musculoskeletal:   [] Normal gait with no signs of ataxia         [x] Normal range of motion of neck        [] Abnormal-       Neurological:        [x] No Facial Asymmetry (Cranial nerve 7 motor function) (limited exam to video visit)          [x] No gaze palsy        [] Abnormal-            Skin:        [x] No significant exanthematous lesions or discoloration noted on facial skin         [] Abnormal-            Psychiatric:      [x] No Hallucinations       [x]Mood is normal      [x]Behavior is normal      [x]Judgment is normal      [x]Thought content is normal       [] Abnormal-     Other pertinent observable physical exam findings-    Due to this being a TeleHealth encounter, evaluation of the following organ systems is limited: Vitals/Constitutional/EENT/Resp/CV/GI//MS/Neuro/Skin/Heme-Lymph-Imm. Lab Results   Component Value Date    WBC 5.8 04/03/2021    HGB 14.2 04/03/2021    HCT 43.8 04/03/2021    MCV 92.6 04/03/2021     04/03/2021       Lab Results   Component Value Date     04/03/2021    K 4.1 04/03/2021     04/03/2021    CO2 22 04/03/2021    BUN 9 04/03/2021    CREATININE 0.52 04/03/2021    GLUCOSE 84 04/03/2021    GLUCOSE 92 02/20/2012    CALCIUM 8.4 04/03/2021        Lab Results   Component Value Date    ALT 35 (H) 12/12/2019    AST 24 12/12/2019    ALKPHOS 57 12/12/2019    BILITOT 0.28 (L) 12/12/2019       Lab Results   Component Value Date    TSH 2.02 12/12/2019       Lab Results   Component Value Date    CHOL 150 04/03/2021    CHOL 168 02/20/2012     Lab Results   Component Value Date    TRIG 80 04/03/2021    TRIG 120 02/20/2012     Lab Results   Component Value Date    HDL 46 04/03/2021    HDL 47 12/12/2019    HDL 48 02/20/2012     Lab Results   Component Value Date    LDLCHOLESTEROL 88 04/03/2021    LDLCHOLESTEROL 100 12/12/2019    LDLCHOLESTEROL 96 02/20/2012       Lab Results   Component Value Date    CHOLHDLRATIO 3.3 04/03/2021    CHOLHDLRATIO 3.5 12/12/2019    CHOLHDLRATIO 3.5 02/20/2012       Lab Results   Component Value Date    LABA1C 5.4 04/03/2021         No results found for: GVQCEATZ05    No results found for: VITD25    No orders of the defined types were placed in this encounter. No orders of the defined types were placed in this encounter. There are no discontinued medications. ASSESSMENT/PLAN:    1. Near syncope  2. Class 3 severe obesity without serious comorbidity with body mass index (BMI) of 40.0 to 44.9 in adult, unspecified obesity type ([address]  no further issues with near syncope. Continue to stay hydrated. Will call [place]'s office for appt. All patient questions answered. Patient voiced understanding.   The patient's past medical,surgical, social, and family history as well as her current medications and allergies were reviewed as documented in today's encounter. Medications, labs, diagnostic studies, consultations and follow-up as documented in this encounter. No follow-ups on file. Data Unavailable    Future Appointments   Date Time Provider Morales Cathy   2021 11:30 AM STA SCR MAMMO RM 2 STAZ MAMMO STA Radiolog           Rosalind Soliman is a 37 y.o. female being evaluated by a Virtual Visit (video visit) encounter to address concerns as mentioned above. Due to this being a TeleHealth encounter (During [facility]SQGQ-[facility] public [facility] emergency), evaluation of the following organ systems was limited: Vitals/Constitutional/EENT/Resp/CV/GI//MS/Neuro/Skin/Heme-Lymph-Imm. Pursuant to the emergency declaration under the [address] [place] [address], [address] [place] [address] authority and the [employer] Act, this Virtual Visit was conducted with patient's (and/or legal guardian's) consent, to reduce the patient's risk of exposure to COVID-19 and provide necessary medical care. The patient (and/or legal guardian) has also been advised to contact this office for worsening conditions or problems, and seek emergency medical treatment and/or call 911 if deemed necessary. Patient identification was verified at the start of the visit: Yes      --Mamie Weeks, APRN - CNP on 2021 at 8:14 AM  An electronic signature was used to authenticate this note.

## 2021-06-18 ENCOUNTER — HOSPITAL ENCOUNTER (OUTPATIENT)
Dept: MAMMOGRAPHY | Age: 41
Discharge: HOME | End: 2021-06-20
Payer: MEDICARE

## 2021-06-18 DIAGNOSIS — Z12.31 ENCOUNTER FOR SCREENING MAMMOGRAM FOR BREAST CANCER: ICD-10-CM

## 2021-06-18 PROCEDURE — 77063 BREAST TOMOSYNTHESIS BI: CPT

## 2021-07-09 ENCOUNTER — TELEPHONE (OUTPATIENT)
Dept: OBGYN CLINIC | Age: 41
End: 2021-07-09

## 2021-07-09 DIAGNOSIS — Z80.3 FH: BREAST CANCER: ICD-10-CM

## 2021-07-09 DIAGNOSIS — R92.2 BREAST DENSITY: Primary | ICD-10-CM

## 2021-07-09 NOTE — TELEPHONE ENCOUNTER
38 y/o Contacting pt regarding mammogram done on 06/18/21. Normal but radiologist recommend that do to your elevated risk factors that you be seen by high risk breast clinic.  (TC 24.9% and MRI due 12/21)

## 2021-07-21 RX ORDER — PNV,CALCIUM 72/IRON/FOLIC ACID 27 MG-1 MG
TABLET ORAL
Qty: 30 TABLET | Refills: 8 | Status: SHIPPED | OUTPATIENT
Start: 2021-07-21 | End: 2022-02-10 | Stop reason: ALTCHOICE

## 2021-07-26 RX ORDER — BUPROPION HYDROCHLORIDE 150 MG/1
TABLET ORAL
Qty: 30 TABLET | Refills: 5 | Status: SHIPPED | OUTPATIENT
Start: 2021-07-26 | End: 2022-02-07 | Stop reason: SDUPTHER

## 2021-07-26 NOTE — TELEPHONE ENCOUNTER
Please Approve or Refuse.   Send to Pharmacy per Pt's Request:      Next Visit Date:  Visit date not found   Last Visit Date: 6/7/2021    Hemoglobin A1C (%)   Date Value   04/03/2021 5.4             ( goal A1C is < 7)   BP Readings from Last 3 Encounters:   03/30/21 122/74   12/10/20 114/84   08/11/20 102/84          (goal 120/80)  BUN   Date Value Ref Range Status   04/03/2021 9 6 - 20 mg/dL Final     CREATININE   Date Value Ref Range Status   04/03/2021 0.52 0.50 - 0.90 mg/dL Final     Potassium   Date Value Ref Range Status   04/03/2021 4.1 3.7 - 5.3 mmol/L Final

## 2021-08-16 DIAGNOSIS — Z30.09 FAMILY PLANNING: ICD-10-CM

## 2021-08-16 RX ORDER — NORETHINDRONE ACETATE AND ETHINYL ESTRADIOL 1.5-30(21)
KIT ORAL
Qty: 28 TABLET | Refills: 12 | OUTPATIENT
Start: 2021-08-16

## 2021-08-16 RX ORDER — NORETHINDRONE ACETATE AND ETHINYL ESTRADIOL 1.5-30(21)
KIT ORAL
Qty: 28 TABLET | Refills: 0 | Status: SHIPPED | OUTPATIENT
Start: 2021-08-16 | End: 2021-09-27

## 2021-08-16 NOTE — TELEPHONE ENCOUNTER
Pt calling in response to Jordan Dorantes message on her VM,  to say she was Dx HTN prior to her pregnancy 2015, during pregnancy and del 01/21/15 no elevated BP. Not had any episodes of elevated BP since prior to that pregnancy >6 yrs.

## 2021-08-16 NOTE — TELEPHONE ENCOUNTER
Can we please clarify \"secondary hypertension\" listed in her problem list?  If she has the dx, will need to switch to progesterone only pills

## 2021-09-18 DIAGNOSIS — Z30.09 FAMILY PLANNING: ICD-10-CM

## 2021-09-20 RX ORDER — NORETHINDRONE ACETATE AND ETHINYL ESTRADIOL 1.5-30(21)
KIT ORAL
Qty: 28 TABLET | Refills: 0 | OUTPATIENT
Start: 2021-09-20

## 2021-10-11 ASSESSMENT — ENCOUNTER SYMPTOMS
DIARRHEA: 0
COUGH: 0
SHORTNESS OF BREATH: 0
ABDOMINAL DISTENTION: 0
BACK PAIN: 0
CONSTIPATION: 0
ABDOMINAL PAIN: 0

## 2021-10-11 NOTE — PROGRESS NOTES
some hot flashes/sweaty). Genitourinary: Negative for flank pain, frequency, menstrual problem, pelvic pain and vaginal discharge. Musculoskeletal: Negative for back pain. Neurological: Negative for syncope and headaches. Psychiatric/Behavioral: Negative for behavioral problems. There were no vitals taken for this visit. Physical Exam  Constitutional:       Appearance: She is well-developed. HENT:      Head: Normocephalic. Eyes:      Extraocular Movements: Extraocular movements intact. Conjunctiva/sclera: Conjunctivae normal.   Neck:      Thyroid: No thyromegaly. Trachea: No tracheal deviation. Pulmonary:      Effort: Pulmonary effort is normal. No respiratory distress. Chest:      Breasts: Breasts are symmetrical.         Right: No inverted nipple. Left: No inverted nipple, mass, nipple discharge, skin change or tenderness. Abdominal:      General: There is no distension. Palpations: Abdomen is soft. There is no mass. Tenderness: There is no abdominal tenderness. Genitourinary:     Labia:         Right: No rash or lesion. Left: No rash or lesion. Vagina: No vaginal discharge or tenderness. Cervix: No cervical motion tenderness, discharge or friability. Uterus: Not deviated, not enlarged and not fixed. Adnexa:         Right: No mass, tenderness or fullness. Left: No mass, tenderness or fullness. Musculoskeletal:         General: No tenderness. Normal range of motion. Cervical back: Normal range of motion. Skin:     General: Skin is warm and dry. Neurological:      General: No focal deficit present. Mental Status: She is alert and oriented to person, place, and time. Mental status is at baseline. Psychiatric:         Mood and Affect: Mood normal.         Behavior: Behavior normal.         Thought Content: Thought content normal.         Judgment: Judgment normal.                 ASSESSMENT:    37 y.o. Female; Annual   Diagnosis Orders   1. Encounter for gynecological examination  PAP SMEAR   2. Encounter for screening mammogram for breast cancer  SWETHA WALESAK DIGITAL SCREEN BILATERAL     No follow-ups on file. Hereditary Breast, Ovarian, Colon and Uterine Cancer screening Done. Tobacco & Secondary smoke risks reviewed; instructed on cessation and avoidance    - Pap collected per ASCCP guidelines.  -Discussed menopausal symptoms, HRT, incontinence. - Screening mammogram discussed and advised yearly if normalstarting at age 36.  - Calcium and Vitamin D dosing reviewed. - Colonoscopy screening reviewed. - General diet and exercise reviewed. - Routine health maintenance per patients PCP.     Electronically signed by TORIBIO Jimenez CNP on 10/11/21 at 7:08 PM EDT  [id number] [facility]

## 2021-10-12 ENCOUNTER — HOSPITAL ENCOUNTER (OUTPATIENT)
Age: 41
Setting detail: SPECIMEN
Discharge: HOME | End: 2021-10-12
Payer: MEDICARE

## 2021-10-12 ENCOUNTER — OFFICE VISIT (OUTPATIENT)
Dept: OBGYN CLINIC | Age: 41
End: 2021-10-12
Payer: MEDICARE

## 2021-10-12 VITALS
BODY MASS INDEX: 44.48 KG/M2 | WEIGHT: 283.4 LBS | SYSTOLIC BLOOD PRESSURE: 114 MMHG | HEIGHT: 67 IN | DIASTOLIC BLOOD PRESSURE: 84 MMHG

## 2021-10-12 DIAGNOSIS — Z12.31 ENCOUNTER FOR SCREENING MAMMOGRAM FOR BREAST CANCER: ICD-10-CM

## 2021-10-12 DIAGNOSIS — Z30.41 ORAL CONTRACEPTIVE PILL SURVEILLANCE: ICD-10-CM

## 2021-10-12 DIAGNOSIS — Z01.419 ENCOUNTER FOR GYNECOLOGICAL EXAMINATION: Primary | ICD-10-CM

## 2021-10-12 PROCEDURE — 99396 PREV VISIT EST AGE 40-64: CPT | Performed by: NURSE PRACTITIONER

## 2021-10-12 PROCEDURE — G8484 FLU IMMUNIZE NO ADMIN: HCPCS | Performed by: NURSE PRACTITIONER

## 2021-10-12 RX ORDER — NORETHINDRONE ACETATE AND ETHINYL ESTRADIOL 1.5-30(21)
KIT ORAL
Qty: 28 TABLET | Refills: 12 | Status: SHIPPED | OUTPATIENT
Start: 2021-10-12

## 2021-10-18 LAB — CYTOLOGY REPORT: NORMAL

## 2022-01-27 DIAGNOSIS — Z80.3 FH: BREAST CANCER: ICD-10-CM

## 2022-01-27 DIAGNOSIS — R92.2 BREAST DENSITY: Primary | ICD-10-CM

## 2022-01-27 NOTE — PROGRESS NOTES
Faxing orders to [facility] [place] [facility].

## 2022-02-07 ENCOUNTER — TELEPHONE (OUTPATIENT)
Dept: FAMILY MEDICINE CLINIC | Age: 42
End: 2022-02-07

## 2022-02-07 DIAGNOSIS — F34.1 DYSTHYMIA: Primary | ICD-10-CM

## 2022-02-07 RX ORDER — BUPROPION HYDROCHLORIDE 150 MG/1
TABLET ORAL
Qty: 30 TABLET | Refills: 0 | Status: SHIPPED | OUTPATIENT
Start: 2022-02-07 | End: 2022-02-10 | Stop reason: SDUPTHER

## 2022-02-07 NOTE — TELEPHONE ENCOUNTER
Please Approve or Refuse.   Send to Pharmacy per Pt's Request:      Next Visit Date:  2/10/2022 -has a NTP with you  Last Visit Date: 6/7/2021    Hemoglobin A1C (%)   Date Value   04/03/2021 5.4             ( goal A1C is < 7)   BP Readings from Last 3 Encounters:   10/12/21 114/84   03/30/21 122/74   12/10/20 114/84          (goal 120/80)  BUN   Date Value Ref Range Status   04/03/2021 9 6 - 20 mg/dL Final     CREATININE   Date Value Ref Range Status   04/03/2021 0.52 0.50 - 0.90 mg/dL Final     Potassium   Date Value Ref Range Status   04/03/2021 4.1 3.7 - 5.3 mmol/L Final

## 2022-02-07 NOTE — TELEPHONE ENCOUNTER
Patient did not want to move appt sooner, also stated that she does not need a dosage change can she just get a script called in since she has an appt established this wed. Please advise. She states that she is in need of this medication and has been out for a few days now.

## 2022-02-07 NOTE — TELEPHONE ENCOUNTER
----- Message from Keystephan Maldonado sent at 2/7/2022  8:07 AM EST -----  Subject: Refill Request    QUESTIONS  Name of Medication? buPROPion (WELLBUTRIN XL) 150 MG extended release   tablet  Patient-reported dosage and instructions? 150 mg daily   How many days do you have left? 0  Preferred Pharmacy? [facility]  Pharmacy phone number (if available)? [phone]  ---------------------------------------------------------------------------  --------------  CALL BACK INFO  What is the best way for the office to contact you? OK to leave message on   voicemail  Preferred Call Back Phone Number?  [phone]

## 2022-02-07 NOTE — TELEPHONE ENCOUNTER
Refill was given in the morning    Medication  buPROPion (WELLBUTRIN XL) 150 MG extended release tablet [[id number]]    buPROPion (WELLBUTRIN XL) 150 MG extended release tablet [[id number]]     Order Details  Dose, Route, Frequency: As Directed   Dispense Quantity: 30 tablet Refills: 0          Sig: TAKE ONE TABLET BY MOUTH EVERY MORNING         Start Date: 02/07/22 End Date: --   Written Date: 02/07/22 Expiration Date: 02/07/23       Diagnosis Association: Dysthymia (F34.1)   Original Order:  buPROPion (WELLBUTRIN XL) 150 MG extended release tablet [[id number]]     Providers    Authorizing Provider: Lavern Gabriel MD NPI: [id number]   Ordering User:  Lavern Gabriel, [address] [place] [address] [phone] - [facility]   [address] [address]   Phone:  [phone]  Fax:  [phone]

## 2022-02-07 NOTE — TELEPHONE ENCOUNTER
----- Message from Gulshan Resendez sent at 2/7/2022  8:06 AM EST -----  Subject: Message to Provider    QUESTIONS  Information for Provider? Pt has appt on 02/10/22 and would like office to   call her in a refill for her antidepressant buPROPion. Her previous Dr   retired and feels she needs medication before her appt on 02/10. Pt has   been out since 02/04. Please call pt back to advise.   ---------------------------------------------------------------------------  --------------  CALL BACK INFO  What is the best way for the office to contact you? OK to leave message on   voicemail  Preferred Call Back Phone Number? [phone]  ---------------------------------------------------------------------------  --------------  SCRIPT ANSWERS  Relationship to Patient?  Self

## 2022-02-09 ASSESSMENT — PATIENT HEALTH QUESTIONNAIRE - PHQ9
SUM OF ALL RESPONSES TO PHQ QUESTIONS 1-9: 0
SUM OF ALL RESPONSES TO PHQ QUESTIONS 1-9: 0
1. LITTLE INTEREST OR PLEASURE IN DOING THINGS: 0
SUM OF ALL RESPONSES TO PHQ QUESTIONS 1-9: 0
SUM OF ALL RESPONSES TO PHQ9 QUESTIONS 1 & 2: 0
2. FEELING DOWN, DEPRESSED OR HOPELESS: 0
SUM OF ALL RESPONSES TO PHQ QUESTIONS 1-9: 0

## 2022-02-10 ENCOUNTER — TELEMEDICINE (OUTPATIENT)
Dept: FAMILY MEDICINE CLINIC | Age: 42
End: 2022-02-10
Payer: MEDICARE

## 2022-02-10 ENCOUNTER — TELEPHONE (OUTPATIENT)
Dept: FAMILY MEDICINE CLINIC | Age: 42
End: 2022-02-10

## 2022-02-10 VITALS — HEIGHT: 67 IN | BODY MASS INDEX: 43.95 KG/M2 | WEIGHT: 280 LBS

## 2022-02-10 DIAGNOSIS — L65.9 HAIR LOSS: ICD-10-CM

## 2022-02-10 DIAGNOSIS — F33.42 RECURRENT MAJOR DEPRESSIVE DISORDER, IN FULL REMISSION (HCC): ICD-10-CM

## 2022-02-10 DIAGNOSIS — E66.01 CLASS 3 SEVERE OBESITY WITHOUT SERIOUS COMORBIDITY WITH BODY MASS INDEX (BMI) OF 40.0 TO 44.9 IN ADULT, UNSPECIFIED OBESITY TYPE (HCC): ICD-10-CM

## 2022-02-10 DIAGNOSIS — R76.8 ANA POSITIVE: ICD-10-CM

## 2022-02-10 DIAGNOSIS — Z30.09 BIRTH CONTROL COUNSELING: ICD-10-CM

## 2022-02-10 DIAGNOSIS — Z28.21 COVID-19 VACCINATION DECLINED: ICD-10-CM

## 2022-02-10 DIAGNOSIS — R53.82 CHRONIC FATIGUE: Primary | ICD-10-CM

## 2022-02-10 PROCEDURE — G8427 DOCREV CUR MEDS BY ELIG CLIN: HCPCS | Performed by: FAMILY MEDICINE

## 2022-02-10 PROCEDURE — 99214 OFFICE O/P EST MOD 30 MIN: CPT | Performed by: FAMILY MEDICINE

## 2022-02-10 RX ORDER — BUPROPION HYDROCHLORIDE 150 MG/1
TABLET ORAL
Qty: 90 TABLET | Refills: 2 | Status: SHIPPED | OUTPATIENT
Start: 2022-02-10 | End: 2022-03-09

## 2022-02-10 ASSESSMENT — ENCOUNTER SYMPTOMS
WHEEZING: 0
ROS SKIN COMMENTS: HAIR LOSS
CHEST TIGHTNESS: 0
CONSTIPATION: 0
ABDOMINAL PAIN: 0
VOMITING: 0
DIARRHEA: 0
SHORTNESS OF BREATH: 0
COUGH: 0
NAUSEA: 0
ABDOMINAL DISTENTION: 0

## 2022-02-10 NOTE — PROGRESS NOTES
2/10/2022    TELEHEALTH EVALUATION -- Audio/Visual (During KRHDD-79 public health emergency)      Azael Nagy (:  1980) is a 37 y.o. female,New patient, prior PCP was Teresa Andino who left our practice, here for evaluation of the following chief complaint(s): Depression and Health Maintenance (Pt declined covid and flu vaccine)        ASSESSMENT/PLAN:    1. Chronic fatigue  Failing to change as expected. Will do basic labs to rule out certain common medical conditions: hematologic, renal, hepatic, electrolyte imbalances, thyroid disorders,  Vitamin D deficiency, immunologic disorder    -     TSH without Reflex; Future  -     Vitamin D 25 Hydroxy; Future  -     CBC; Future  -     Comprehensive Metabolic Panel; Future  -     TRACIE Screen with Reflex; Future  -     Sedimentation Rate; Future  -     C-Reactive Protein; Future  2. Recurrent major depressive disorder, in full remission ([facility] [address]  Stable  Improved with Wellbutrin, denies side effects, continue current treatment, will continue to monitor  -     buPROPion (WELLBUTRIN XL) 150 MG extended release tablet; TAKE ONE TABLET BY MOUTH EVERY MORNING, Disp-90 tablet, R-2Normal  3. Hair loss  Failing to change as expected. We will check labs, Will do basic labs to rule out certain common medical conditions: hematologic, renal, hepatic, electrolyte imbalances, thyroid disorders, autoimmune disorder, inflammatory disorders.    -     TSH without Reflex; Future  -     Vitamin D 25 Hydroxy; Future  -     CBC; Future  -     Comprehensive Metabolic Panel; Future  -     TRACIE Screen with Reflex; Future  -     Sedimentation Rate; Future  -     C-Reactive Protein; Future  4.  Class 3 severe obesity without serious comorbidity with body mass index (BMI) of 40.0 to 44.9 in adult, unspecified obesity type [facility])  Improved  Patient reports her weight is 280 pounds, improved weight  Wt Readings from Last 3 Encounters:   10/12/21 283 lb 6.4 oz (128.5 kg)   21 271 lb 3.2 oz (123 kg)   12/10/20 275 lb 9.6 oz (125 kg)       -     TSH without Reflex; Future  -     CBC; Future  -     Comprehensive Metabolic Panel; Future  Low carb, low fat diet, increase fruits and vegetables, and exercise 4-5 times a week 30-40 minutes a day, or walk 1-2 hours per day, or wear a pedometer and get at least 10,000 steps per day. 5. COVID-19 vaccination declined  Declines despite counseling  6. TRACIE positive  Previously positive TRACIE anti-SSA mildly positive  -     TRACIE Screen with Reflex; Future  7. Birth control counseling  Birth control counseling. The patient was seen and counseled on all forms of birth control both male and female  reversible and non. She is aware that hormonal based birth control may increase her risk of developing a blood clot which may increase her morbidity and or mortality. She was counseled on alternate non hormonal based contraception options. We discussed that smoking and any hormonal based contraception may increase the patients risks of developing these life threatening blood clots. Patient does not smoke    The patient was instructed to use barrier contraception for sexually transmitted disease prevention. The patient was also informed of antibiotics decreasing contraceptive efficacy and the need for barrier contraception from the onset of her antibiotic dosing and through a minimum of thirty days from antibiotic cessation. The life threatening side effect profile was reviewed in detail this includes but is not limited to shortness of breath, chest pain, severe or persistent headaches, or calf pain. If any of these occur the patient has been instructed to stop using her hormonal based contraception, notify the office, and go to the emergency department or call 911.     The patient denied any personal history of blood clots in her leg, lung, or heart and denied any family history of stroke, TIA, sudden cardiac death < 36 y.o.,pulmonary embolism, or deep venous thrombosis. She will have to discuss with her brother if he had any testing after having the blood clots after knee surgery. Faizan Bella received counseling on the following healthy behaviors: nutrition, exercise, medication adherence and weight loss. Reviewed prior labs and health maintenance  Discussed use, benefit, and side effects of prescribed medications. Barriers to medication compliance addressed. Patient given educational materials - see patient instructions  All patient questions answered. Patient voiced understanding. The patient's past medical,surgical, social, and family history as well as her current medications and allergies were reviewed as documented in today's encounter. Medications, labs, diagnostic studies, consultations and follow-up as documented in this encounter. Return in about 6 months (around 8/10/2022) for Face-2F-30mins PHYSICAL, VISION screen, PHQ9. .    Data Unavailable    No future appointments. SUBJECTIVE/OBJECTIVE:  Harvey Mccullough (:  1980) has requested an audio/video evaluation for the following concern(s):Depression and Health Maintenance (Pt declined covid and flu vaccine)      Patient reports being tired a lot for the past several months,has hair loss, falling more lately, not getting better. Patient says 1 family member has thyroid issues she would like her thyroid checked. Her brother had blood clots after knee surgery. She does not know if he is still on blood thinners or if he was told he has any cause of developing blood clots. I advised her to check with him. She denies fever, chills, night sweats  Denies cold or heat intolerance, dry skin, constipation. She works in a , and she also has a 3 yo at home. Prior TRACIE positive, with anti-SSA antibodies increased, small titer, she was not aware. She denies joint pains or rashes.   Lab Results   Component Value Date    TRACIE POSITIVE (A) 2013       Depression is under good control, she works in a   Has been taking wellbutrin, tolerated well, denies side effects. Denies suicidal ideation, plan or intent. PHQ-2 Over the past 2 weeks, how often have you been bothered by any of the following problems? Little interest or pleasure in doing things: Not at all  Feeling down, depressed, or hopeless: Not at all  PHQ-2 Score: 0  PHQ-9 Over the past 2 weeks, how often have you been bothered by any of the following problems? PHQ-9 Total Score: 0  PHQ-9 Total Score: 0    PHQ Scores 2/9/2022 6/4/2021 3/30/2021 4/22/2020 12/12/2019   PHQ2 Score 0 0 2 2 2   PHQ9 Score 0 0 2 2 2       Morbid obesity per BMI, she reports 280 pounds, has lost 3 pounds since the last time. She has been very active at work and at home. Wt Readings from Last 3 Encounters:   10/12/21 283 lb 6.4 oz (128.5 kg)   03/30/21 271 lb 3.2 oz (123 kg)   12/10/20 275 lb 9.6 oz (125 kg)     Patient absolutely declines COVID-19 vaccine, despite counseling. Patient is on birth control pill, provided to her by her GYN. Counseling given. Review of Systems   Constitutional: Positive for fatigue. Negative for activity change, appetite change, chills, diaphoresis, fever and unexpected weight change. Respiratory: Negative for cough, chest tightness, shortness of breath and wheezing. Cardiovascular: Negative for chest pain, palpitations and leg swelling. Gastrointestinal: Negative for abdominal distention, abdominal pain, constipation, diarrhea, nausea and vomiting. Endocrine: Negative for cold intolerance, heat intolerance, polydipsia, polyphagia and polyuria. Musculoskeletal: Negative for arthralgias and myalgias. Skin: Negative for rash. Hair loss   Allergic/Immunologic: Positive for environmental allergies. Neurological: Negative for headaches. Hematological: Does not bruise/bleed easily. Psychiatric/Behavioral: Negative for dysphoric mood, self-injury, sleep disturbance and suicidal ideas. The patient is not nervous/anxious. Prior to Visit Medications    Medication Sig Taking? Authorizing Provider   buPROPion (WELLBUTRIN XL) 150 MG extended release tablet TAKE ONE TABLET BY MOUTH EVERY MORNING Yes Sheryl Strange MD   norethindrone-ethinyl estradiol-iron (BLISOVI FE 1.5/30) 1.5-30 MG-MCG tablet TAKE ONE TABLET BY MOUTH DAILY Yes TORIBIO Rivers - CNP   cetirizine (ZYRTEC) 10 MG tablet Take 10 mg by mouth daily Indications: OTC  Yes Historical Provider, MD       Social History     Tobacco Use    Smoking status: Never Smoker    Smokeless tobacco: Never Used   Vaping Use    Vaping Use: Never used   Substance Use Topics    Alcohol use: No    Drug use: No          PHYSICAL EXAMINATION:    Vital Signs: (As obtained by patient/caregiver or practitioner observation)  -vital signs stable and within normal limits except morbid obesity per BMI BMI 43.85 kg/M2  Patient-Reported Vitals 2/10/2022   Patient-Reported Weight 280 pounds   Patient-Reported Height 5 foot 7 inches           Intensity of pain is 0 out of 10      Constitutional: [x] Appears well-developed and well-nourished [x] No apparent distress      [x] Abnormal-obese    Mental status  [x] Alert and awake  [x] Oriented to person/place/time [x]Able to follow commands      Eyes:  EOM    [x]  Normal  [] Abnormal-  Sclera  [x]  Normal  [] Abnormal -         Discharge [x]  None visible  [] Abnormal -    HENT:   [x] Normocephalic, atraumatic.   [] Abnormal   [x] Mouth/Throat: Mucous membranes are moist.     External Ears [x] Normal  [] Abnormal-     Neck: [x] No visualized mass     Pulmonary/Chest: [x] Respiratory effort normal.  [x] No visualized signs of difficulty breathing or respiratory distress        [] Abnormal        Musculoskeletal:   [x] Normal gait with no signs of ataxia         [x] Normal range of motion of neck        [] Abnormal-       Neurological:        [x] No Facial Asymmetry (Cranial nerve 7 motor function) (limited exam to video visit)          [x] No gaze palsy        [] Abnormal-            Skin:        [x] No significant exanthematous lesions or discoloration noted on facial skin         [] Abnormal-            Psychiatric:      [x] No Hallucinations       [x]Mood is normal      [x]Behavior is normal      [x]Judgment is normal      [x]Thought content is normal       [] Abnormal-     Other pertinent observable physical exam findings- none    Due to this being a TeleHealth encounter, evaluation of the following organ systems is limited: Vitals/Constitutional/EENT/Resp/CV/GI//MS/Neuro/Skin/Heme-Lymph-Imm. I personally reviewed most recent labs reviewed with the patient and all questions fully answered.    Increased LFTs in the past  Otherwise labs within normal limits        Lab Results   Component Value Date    WBC 5.8 04/03/2021    HGB 14.2 04/03/2021    HCT 43.8 04/03/2021    MCV 92.6 04/03/2021     04/03/2021       Lab Results   Component Value Date     04/03/2021    K 4.1 04/03/2021     04/03/2021    CO2 22 04/03/2021    BUN 9 04/03/2021    CREATININE 0.52 04/03/2021    GLUCOSE 84 04/03/2021    GLUCOSE 92 02/20/2012    CALCIUM 8.4 04/03/2021        Lab Results   Component Value Date    ALT 35 (H) 12/12/2019    AST 24 12/12/2019    ALKPHOS 57 12/12/2019    BILITOT 0.28 (L) 12/12/2019       Lab Results   Component Value Date    TSH 2.02 12/12/2019       Lab Results   Component Value Date    CHOL 150 04/03/2021    CHOL 168 02/20/2012     Lab Results   Component Value Date    TRIG 80 04/03/2021    TRIG 120 02/20/2012     Lab Results   Component Value Date    HDL 46 04/03/2021    HDL 47 12/12/2019    HDL 48 02/20/2012     Lab Results   Component Value Date    LDLCHOLESTEROL 88 04/03/2021    LDLCHOLESTEROL 100 12/12/2019    LDLCHOLESTEROL 96 02/20/2012       Lab Results   Component Value Date    CHOLHDLRATIO 3.3 04/03/2021    CHOLHDLRATIO 3.5 12/12/2019    CHOLHDLRATIO 3.5 02/20/2012       Lab Results Component Value Date    LABA1C 5.4 04/03/2021     Lab Results   Component Value Date    HEPCAB NONREACTIVE 04/03/2021         No results found for: Nan Learn    No results found for: VITD25    Orders Placed This Encounter   Medications    buPROPion (WELLBUTRIN XL) 150 MG extended release tablet     Sig: TAKE ONE TABLET BY MOUTH EVERY MORNING     Dispense:  90 tablet     Refill:  2       Orders Placed This Encounter   Procedures    TSH without Reflex     Standing Status:   Future     Standing Expiration Date:   2/10/2023    Vitamin D 25 Hydroxy     Standing Status:   Future     Standing Expiration Date:   2/10/2023    CBC     Standing Status:   Future     Standing Expiration Date:   2/10/2023    Comprehensive Metabolic Panel     Standing Status:   Future     Standing Expiration Date:   4/9/2022    TRACIE Screen with Reflex     Standing Status:   Future     Standing Expiration Date:   2/10/2023    Sedimentation Rate     Standing Status:   Future     Standing Expiration Date:   2/10/2023    C-Reactive Protein     Standing Status:   Future     Standing Expiration Date:   4/9/2022       Medications Discontinued During This Encounter   Medication Reason    Prenatal Vit-Fe Fumarate-FA (PREPLUS) 27-1 MG TABS Therapy completed    buPROPion (WELLBUTRIN XL) 150 MG extended release tablet REORDER              Fahad Falling, was evaluated through a synchronous (real-time) audio-video encounter. The patient (or guardian if applicable) is aware that this is a billable service, which includes applicable co-pays. The virtual visit was conducted with patient's (and/or legal guardian consent). Verbal consent to proceed has been obtained within the past 12 months. The visit was conducted pursuant to the emergency declaration under the [address] [place] [address], [address] [place] [address] authority and the [employer] Act.   Patient identification was verified    Patient was alone   The patient was located in a state where the provider was licensed to provide care. On this date 2/10/2022 I have spent 35 minutes reviewing previous notes, test results and face to face with the patient discussing the diagnosis and importance of compliance with the treatment plan as well as documenting on the day of the visit. --Sheryl Strange MD on 2/10/2022 at 8:50 PM    An electronic signature was used to authenticate this note.

## 2022-02-11 NOTE — TELEPHONE ENCOUNTER
Return in about 6 months (around 8/10/2022) for Face-2F-30mins PHYSICAL, VISION screen, PHQ9. Saran Andino

## 2022-02-11 NOTE — TELEPHONE ENCOUNTER
Noted  Future Appointments   Date Time Provider Morales Morgan   8/12/2022  8:30 AM [facility] C Oliver [facility]

## 2022-02-12 ENCOUNTER — HOSPITAL ENCOUNTER (OUTPATIENT)
Age: 42
Discharge: HOME | End: 2022-02-12
Payer: MEDICARE

## 2022-02-12 DIAGNOSIS — R76.8 ANA POSITIVE: ICD-10-CM

## 2022-02-12 DIAGNOSIS — R53.82 CHRONIC FATIGUE: ICD-10-CM

## 2022-02-12 DIAGNOSIS — L65.9 HAIR LOSS: ICD-10-CM

## 2022-02-12 DIAGNOSIS — E66.01 CLASS 3 SEVERE OBESITY WITHOUT SERIOUS COMORBIDITY WITH BODY MASS INDEX (BMI) OF 40.0 TO 44.9 IN ADULT, UNSPECIFIED OBESITY TYPE (HCC): ICD-10-CM

## 2022-02-12 LAB
ALBUMIN SERPL-MCNC: 4 G/DL (ref 3.5–5.2)
ALBUMIN/GLOBULIN RATIO: 1.4 (ref 1–2.5)
ALP BLD-CCNC: 66 U/L (ref 35–104)
ALT SERPL-CCNC: 32 U/L (ref 5–33)
ANION GAP SERPL CALCULATED.3IONS-SCNC: 13 MMOL/L (ref 9–17)
AST SERPL-CCNC: 23 U/L
BILIRUB SERPL-MCNC: 0.32 MG/DL (ref 0.3–1.2)
BUN BLDV-MCNC: 9 MG/DL (ref 6–20)
BUN/CREAT BLD: NORMAL (ref 9–20)
C-REACTIVE PROTEIN: 4.4 MG/L (ref 0–5)
CALCIUM SERPL-MCNC: 9 MG/DL (ref 8.6–10.4)
CHLORIDE BLD-SCNC: 106 MMOL/L (ref 98–107)
CO2: 25 MMOL/L (ref 20–31)
CREAT SERPL-MCNC: 0.59 MG/DL (ref 0.5–0.9)
GFR AFRICAN AMERICAN: >60 ML/MIN
GFR NON-AFRICAN AMERICAN: >60 ML/MIN
GFR SERPL CREATININE-BSD FRML MDRD: NORMAL ML/MIN/{1.73_M2}
GFR SERPL CREATININE-BSD FRML MDRD: NORMAL ML/MIN/{1.73_M2}
GLUCOSE BLD-MCNC: 90 MG/DL (ref 70–99)
HCT VFR BLD CALC: 43.8 % (ref 36.3–47.1)
HEMOGLOBIN: 14.2 G/DL (ref 11.9–15.1)
MCH RBC QN AUTO: 29.7 PG (ref 25.2–33.5)
MCHC RBC AUTO-ENTMCNC: 32.4 G/DL (ref 28.4–34.8)
MCV RBC AUTO: 91.6 FL (ref 82.6–102.9)
NRBC AUTOMATED: 0 PER 100 WBC
PDW BLD-RTO: 12.1 % (ref 11.8–14.4)
PLATELET # BLD: 422 K/UL (ref 138–453)
PMV BLD AUTO: 9.2 FL (ref 8.1–13.5)
POTASSIUM SERPL-SCNC: 4.3 MMOL/L (ref 3.7–5.3)
RBC # BLD: 4.78 M/UL (ref 3.95–5.11)
SEDIMENTATION RATE, ERYTHROCYTE: 10 MM (ref 0–20)
SODIUM BLD-SCNC: 144 MMOL/L (ref 135–144)
TOTAL PROTEIN: 6.9 G/DL (ref 6.4–8.3)
TSH SERPL DL<=0.05 MIU/L-ACNC: 1.68 MIU/L (ref 0.3–5)
VITAMIN D 25-HYDROXY: 27.9 NG/ML (ref 30–100)
WBC # BLD: 5.4 K/UL (ref 3.5–11.3)

## 2022-02-12 PROCEDURE — 86038 ANTINUCLEAR ANTIBODIES: CPT

## 2022-02-12 PROCEDURE — 85652 RBC SED RATE AUTOMATED: CPT

## 2022-02-12 PROCEDURE — 84443 ASSAY THYROID STIM HORMONE: CPT

## 2022-02-12 PROCEDURE — 36415 COLL VENOUS BLD VENIPUNCTURE: CPT

## 2022-02-12 PROCEDURE — 86225 DNA ANTIBODY NATIVE: CPT

## 2022-02-12 PROCEDURE — 82306 VITAMIN D 25 HYDROXY: CPT

## 2022-02-12 PROCEDURE — 85027 COMPLETE CBC AUTOMATED: CPT

## 2022-02-12 PROCEDURE — 86140 C-REACTIVE PROTEIN: CPT

## 2022-02-12 PROCEDURE — 80053 COMPREHEN METABOLIC PANEL: CPT

## 2022-02-14 DIAGNOSIS — E55.9 VITAMIN D DEFICIENCY: Primary | ICD-10-CM

## 2022-02-14 LAB
ANTI DNA DOUBLE STRANDED: 2.7 IU/ML
ANTI-NUCLEAR ANTIBODY (ANA): NEGATIVE
ENA ANTIBODIES SCREEN: 0.3 U/ML

## 2022-02-14 RX ORDER — ERGOCALCIFEROL 1.25 MG/1
50000 CAPSULE ORAL WEEKLY
Qty: 12 CAPSULE | Refills: 0 | Status: SHIPPED | OUTPATIENT
Start: 2022-02-14 | End: 2022-08-01

## 2022-02-14 NOTE — RESULT ENCOUNTER NOTE
Please notify patient: Vitamin D is low, would benefit from high doses vitamin D, I will send it to the pharmacy to take it weekly, with food, for 3 months.   Otherwise labs within normal limits  continue current treatment, start vitamin D    Future Appointments  8/12/2022  8:30 AM    MD ruddy Koenig [facility]               [id number]

## 2022-03-09 DIAGNOSIS — F33.42 RECURRENT MAJOR DEPRESSIVE DISORDER, IN FULL REMISSION (HCC): ICD-10-CM

## 2022-03-09 RX ORDER — BUPROPION HYDROCHLORIDE 150 MG/1
TABLET ORAL
Qty: 90 TABLET | Refills: 1 | Status: SHIPPED | OUTPATIENT
Start: 2022-03-09 | End: 2022-08-12 | Stop reason: SDUPTHER

## 2022-04-20 ENCOUNTER — HOSPITAL ENCOUNTER (OUTPATIENT)
Dept: MRI IMAGING | Age: 42
Discharge: HOME | End: 2022-04-22
Payer: MEDICARE

## 2022-04-20 DIAGNOSIS — Z80.3 FH: BREAST CANCER: ICD-10-CM

## 2022-04-20 DIAGNOSIS — R92.2 BREAST DENSITY: ICD-10-CM

## 2022-04-20 PROCEDURE — A9579 GAD-BASE MR CONTRAST NOS,1ML: HCPCS | Performed by: NURSE PRACTITIONER

## 2022-04-20 PROCEDURE — 6360000004 HC RX CONTRAST MEDICATION: Performed by: NURSE PRACTITIONER

## 2022-04-20 PROCEDURE — 77049 MRI BREAST C-+ W/CAD BI: CPT

## 2022-04-20 PROCEDURE — 2580000003 HC RX 258: Performed by: NURSE PRACTITIONER

## 2022-04-20 RX ORDER — SODIUM CHLORIDE 0.9 % (FLUSH) 0.9 %
10 SYRINGE (ML) INJECTION PRN
Status: DISCONTINUED | OUTPATIENT
Start: 2022-04-20 | End: 2022-04-23 | Stop reason: HOSPADM

## 2022-04-20 RX ORDER — 0.9 % SODIUM CHLORIDE 0.9 %
40 INTRAVENOUS SOLUTION INTRAVENOUS ONCE
Status: COMPLETED | OUTPATIENT
Start: 2022-04-20 | End: 2022-04-20

## 2022-04-20 RX ADMIN — SODIUM CHLORIDE 40 ML: 9 INJECTION, SOLUTION INTRAVENOUS at 14:34

## 2022-04-20 RX ADMIN — SODIUM CHLORIDE, PRESERVATIVE FREE 10 ML: 5 INJECTION INTRAVENOUS at 14:34

## 2022-04-20 RX ADMIN — GADOTERIDOL 20 ML: 279.3 INJECTION, SOLUTION INTRAVENOUS at 14:34

## 2022-06-22 RX ORDER — VALACYCLOVIR HYDROCHLORIDE 500 MG/1
500 TABLET, FILM COATED ORAL 2 TIMES DAILY
Qty: 14 TABLET | Refills: 0 | Status: SHIPPED | OUTPATIENT
Start: 2022-06-22 | End: 2022-06-27

## 2022-06-27 RX ORDER — VALACYCLOVIR HYDROCHLORIDE 500 MG/1
TABLET, FILM COATED ORAL
Qty: 14 TABLET | Refills: 0 | Status: SHIPPED | OUTPATIENT
Start: 2022-06-27

## 2022-07-30 DIAGNOSIS — E55.9 VITAMIN D DEFICIENCY: ICD-10-CM

## 2022-08-01 RX ORDER — ERGOCALCIFEROL 1.25 MG/1
CAPSULE ORAL
Qty: 4 CAPSULE | Refills: 2 | Status: SHIPPED | OUTPATIENT
Start: 2022-08-01

## 2022-08-01 NOTE — TELEPHONE ENCOUNTER
Please Approve or Refuse.   Send to Pharmacy per Pt's Request:      Next Visit Date:  8/12/2022   Last Visit Date: 2/10/2022    Hemoglobin A1C (%)   Date Value   04/03/2021 5.4             ( goal A1C is < 7)   BP Readings from Last 3 Encounters:   10/12/21 114/84   03/30/21 122/74   12/10/20 114/84          (goal 120/80)  BUN   Date Value Ref Range Status   02/12/2022 9 6 - 20 mg/dL Final     Creatinine   Date Value Ref Range Status   02/12/2022 0.59 0.50 - 0.90 mg/dL Final     Potassium   Date Value Ref Range Status   02/12/2022 4.3 3.7 - 5.3 mmol/L Final

## 2022-08-12 ENCOUNTER — TELEPHONE (OUTPATIENT)
Dept: FAMILY MEDICINE CLINIC | Age: 42
End: 2022-08-12

## 2022-08-12 ENCOUNTER — TELEMEDICINE (OUTPATIENT)
Dept: FAMILY MEDICINE CLINIC | Age: 42
End: 2022-08-12
Payer: MEDICARE

## 2022-08-12 DIAGNOSIS — E55.9 VITAMIN D DEFICIENCY: ICD-10-CM

## 2022-08-12 DIAGNOSIS — F33.41 RECURRENT MAJOR DEPRESSIVE DISORDER, IN PARTIAL REMISSION (HCC): ICD-10-CM

## 2022-08-12 DIAGNOSIS — E28.2 PCOS (POLYCYSTIC OVARIAN SYNDROME): ICD-10-CM

## 2022-08-12 DIAGNOSIS — Z30.09 ENCOUNTER FOR COUNSELING REGARDING CONTRACEPTION: ICD-10-CM

## 2022-08-12 DIAGNOSIS — B34.9 ACUTE VIRAL SYNDROME: Primary | ICD-10-CM

## 2022-08-12 DIAGNOSIS — E66.01 CLASS 3 SEVERE OBESITY WITHOUT SERIOUS COMORBIDITY WITH BODY MASS INDEX (BMI) OF 40.0 TO 44.9 IN ADULT, UNSPECIFIED OBESITY TYPE (HCC): ICD-10-CM

## 2022-08-12 DIAGNOSIS — R53.82 CHRONIC FATIGUE: ICD-10-CM

## 2022-08-12 PROCEDURE — 99214 OFFICE O/P EST MOD 30 MIN: CPT | Performed by: FAMILY MEDICINE

## 2022-08-12 PROCEDURE — G8427 DOCREV CUR MEDS BY ELIG CLIN: HCPCS | Performed by: FAMILY MEDICINE

## 2022-08-12 RX ORDER — LORATADINE AND PSEUDOEPHEDRINE SULFATE 5; 120 MG/1; MG/1
1 TABLET, EXTENDED RELEASE ORAL 2 TIMES DAILY PRN
Qty: 10 TABLET | Refills: 0 | Status: SHIPPED | OUTPATIENT
Start: 2022-08-12

## 2022-08-12 RX ORDER — BUPROPION HYDROCHLORIDE 150 MG/1
150 TABLET ORAL EVERY MORNING
Qty: 90 TABLET | Refills: 1 | Status: SHIPPED | OUTPATIENT
Start: 2022-08-12

## 2022-08-12 RX ORDER — IPRATROPIUM BROMIDE 21 UG/1
2 SPRAY, METERED NASAL EVERY 12 HOURS
Qty: 30 ML | Refills: 0 | Status: SHIPPED | OUTPATIENT
Start: 2022-08-12

## 2022-08-12 ASSESSMENT — ENCOUNTER SYMPTOMS
ABDOMINAL PAIN: 0
RHINORRHEA: 1
DIARRHEA: 0
CHEST TIGHTNESS: 0
SINUS PRESSURE: 1
WHEEZING: 0
CONSTIPATION: 0
SHORTNESS OF BREATH: 0
SINUS PAIN: 1
NAUSEA: 0
ABDOMINAL DISTENTION: 0
COUGH: 0

## 2022-08-12 ASSESSMENT — PATIENT HEALTH QUESTIONNAIRE - PHQ9
SUM OF ALL RESPONSES TO PHQ9 QUESTIONS 1 & 2: 0
9. THOUGHTS THAT YOU WOULD BE BETTER OFF DEAD, OR OF HURTING YOURSELF: 0
10. IF YOU CHECKED OFF ANY PROBLEMS, HOW DIFFICULT HAVE THESE PROBLEMS MADE IT FOR YOU TO DO YOUR WORK, TAKE CARE OF THINGS AT HOME, OR GET ALONG WITH OTHER PEOPLE: 0
4. FEELING TIRED OR HAVING LITTLE ENERGY: 3
SUM OF ALL RESPONSES TO PHQ QUESTIONS 1-9: 5
SUM OF ALL RESPONSES TO PHQ QUESTIONS 1-9: 5
8. MOVING OR SPEAKING SO SLOWLY THAT OTHER PEOPLE COULD HAVE NOTICED. OR THE OPPOSITE, BEING SO FIGETY OR RESTLESS THAT YOU HAVE BEEN MOVING AROUND A LOT MORE THAN USUAL: 0
6. FEELING BAD ABOUT YOURSELF - OR THAT YOU ARE A FAILURE OR HAVE LET YOURSELF OR YOUR FAMILY DOWN: 0
5. POOR APPETITE OR OVEREATING: 1
SUM OF ALL RESPONSES TO PHQ QUESTIONS 1-9: 5
1. LITTLE INTEREST OR PLEASURE IN DOING THINGS: 0
2. FEELING DOWN, DEPRESSED OR HOPELESS: 0
3. TROUBLE FALLING OR STAYING ASLEEP: 1
SUM OF ALL RESPONSES TO PHQ QUESTIONS 1-9: 5
7. TROUBLE CONCENTRATING ON THINGS, SUCH AS READING THE NEWSPAPER OR WATCHING TELEVISION: 0

## 2022-08-12 NOTE — TELEPHONE ENCOUNTER
Noted. Thank you!     Future Appointments   Date Time Provider Morales Cathy   8/17/2022  9:00 AM [facility]TR[facility]OUND RM 1 [facility] US [facility]

## 2022-08-12 NOTE — TELEPHONE ENCOUNTER
I PRINTED RX AND COUPON AND I'LL LEAVE AT  FOR PATIENT TO       Diagnosis Orders   1. PCOS (polycystic ovarian syndrome)  Semaglutide 3 MG TABS      2. Class 3 severe obesity without serious comorbidity with body mass index (BMI) of 40.0 to 44.9 in adult, unspecified obesity type ([facility] [address]  Semaglutide 3 MG TABS           Future Appointments   Date Time Provider Morales Cathy   8/17/2022  9:00 AM [facility] ULTRASOUND RM 1 [facility] US [facility] Radiolog       WARNER Aly MD  Denied she is not diabetic             Previous Messages        Denied  PA Detail   Your PA request has been denied. Additional information will be provided in the denial communication. (Message [phone]) Your PA request has been denied. Additional information will be provided in the denial communication.  (Message [id number]) Case ID: [id number]      Payer:  [employer] - Medicaid   Electronic appeal:  Not supported   View History

## 2022-08-12 NOTE — TELEPHONE ENCOUNTER
Please submit PA for semaglutidine  Good test negative  Yes I did give her letter for work please fax it      Thank you!       Future Appointments   Date Time Provider Morales Morgan   8/17/2022  9:00 AM [facility] ULTRASOUND RM 1 [facility] US [facility] Radiolog

## 2022-08-12 NOTE — Clinical Note
[facility]  [address] [place] [id number]  Phone: [phone]  Fax: [phone]    Kenneth Marie MD        August 12, 2022     Patient: Emerson Payne   YOB: 1980   Date of Visit: 8/12/2022       To Whom It May Concern: It is my medical opinion that Leonie Cash {Work release (duty restriction):72478}. If you have any questions or concerns, please don't hesitate to call.     Sincerely,        Kenneth Marie MD

## 2022-08-12 NOTE — PROGRESS NOTES
2022    TELEHEALTH EVALUATION -- Audio/Visual (During AEAHI-79 public health emergency)      Swapna Kessler (:  1980) is a 40 y.o. female,Established patient, here for evaluation of the following chief complaint(s): Concern For COVID-19, Congestion, Weight Management, and Menstrual Problem (PCOS, lighter menstrual periods)        ASSESSMENT/PLAN:    1. Acute viral syndrome  We need to rule out COVID-19 infection due to ongoing pandemic and actually being in a another wave  We will give her a letter for work  Symptomatic treatment advised  -     COVID-19 Antigen Test KIT; To rule out COVID-19, to do at home, Disp-1 kit, R-3Normal  -     ipratropium (ATROVENT) 0.03 % nasal spray; 2 sprays by Each Nostril route in the morning and 2 sprays in the evening. X 10 days for congestion. , Disp-30 mL, R-0Normal  -     loratadine-pseudoephedrine (CLARITIN-D 12 HOUR) 5-120 MG per extended release tablet; Take 1 tablet by mouth 2 times daily as needed (congestion), Disp-10 tablet, R-0Normal  2. PCOS (polycystic ovarian syndrome)  Worsening  She would like Ozempic to help with weight loss and PCOS  Patient is already on birth control pills  I did not find an ultrasound of the pelvis, will also update her hormonal testing regarding PCOS  -     Follicle Stimulating Hormone; Future  -     Hemoglobin A1C; Future  -     US PELVIS COMPLETE NON-OB TRANSABDOMINAL AND TRANSVAGINAL; Future  -     Insulin, total; Future  3. Class 3 severe obesity without serious comorbidity with body mass index (BMI) of 40.0 to 44.9 in adult, unspecified obesity type ([facility] [address]  Improved  Check labs  She would like Ozempic to improve her PCOS and help with weight loss, she already lost 20 pounds on her own, praise given. -     TSH; Future  -     Lipid Panel; Future  Continue Wellbutrin to help with cravings    4. Chronic fatigue  Failing to resolve  Will do basic labs  -     Follicle Stimulating Hormone; Future  -     TSH;  Future  - Vitamin D 25 Hydroxy; Future  5. Vitamin D deficiency  Unsure if improving or not. Will recheck level  Continue supplementation.    -     Vitamin D 25 Hydroxy; Future  6. Recurrent major depressive disorder, in partial remission (HCC)  Improved  -     TSH; Future  -     buPROPion (WELLBUTRIN XL) 150 MG extended release tablet; Take 1 tablet by mouth every morning, Disp-90 tablet, R-1Normal  7. Encounter for counseling regarding contraception. Birth control counseling. The patient was seen and counseled on all forms of birth control both male and female  reversible and non. She is aware that hormonal based birth control may increase her risk of developing a blood clot which may increase her morbidity and or mortality. She was counseled on alternate non hormonal based contraception options. We discussed that smoking and any hormonal based contraception may increase the patients risks of developing these life threatening blood clots. Patient does not smoke. The patient was instructed to use barrier contraception for sexually transmitted disease prevention. The patient was also informed of antibiotics decreasing contraceptive efficacy and the need for barrier contraception from the onset of her antibiotic dosing and through a minimum of thirty days from antibiotic cessation. The life threatening side effect profile was reviewed in detail this includes but is not limited to shortness of breath, chest pain, severe or persistent headaches, or calf pain. If any of these occur the patient has been instructed to stop using her hormonal based contraception, notify the office, and go to the emergency department or call 911. The patient denied any personal history of blood clots in her leg, lung, or heart and denied any family history of stroke, TIA, sudden cardiac death < 36 y.o.,pulmonary embolism, or deep venous thrombosis.           Kalie Thomas received counseling on the following healthy behaviors: nutrition, exercise, medication adherence, and weight loss    Reviewed prior labs and health maintenance  Discussed use, benefit, and side effects of prescribed medications. Barriers to medication compliance addressed. Patient given educational materials - see patient instructions  All patient questions answered. Patient voiced understanding. The patient's past medical,surgical, social, and family history as well as her current medications and allergies were reviewed as documented in today's encounter. Medications, labs, diagnostic studies, consultations and follow-up as documented in this encounter. Return in about 3 months (around 2022) for Visit type PHYSICAL, VISION screen, PHQ9. Ismael Rousarahi at 1 pm or Thursday at 8 am , if no openings for  face to face with me. Thank you! Future Appointments   Date Time Provider Morales Morgan   2022  9:00 AM STA ULTRASOUND RM 1 STAZ US STA Radiolog         SUBJECTIVE/OBJECTIVE:  Krysta Watson (:  1980) has requested an audio/video evaluation for the following concern(s):Concern For COVID-19, Congestion, Weight Management, and Menstrual Problem (PCOS, lighter menstrual periods)    Patient was actually scheduled in person, however she failed the screening for COVID-19 and we had to switch her visit to video visit . Patient reports  her allergies are flaring up, which started suddenly last night while sleeping, also having mild sinus pain and sinus pressure  Patient reports mucus is yellowish tinge, thin, and runny, pouring nasal discharge. Denies sore throat or headache  Denies muscle aches  Denies sick contact. She does work at [employer], does not wear mask. She is not vaccinated against COVID-19. Patient reports history of polycystic ovarian syndrome for many years. Patient says she has seen that Ozempic helps with PCOS, she has been trying to lose weight on her own, she wants to lose even more weight.   She is already on birth control pill. Was on Metformin before, in 2017, but did not help her lose weight. On OCP, but does not have a menstrual period every month and she has noticed her menstrual periods started to be more light. I did not find an ultrasound pelvis recently. Patient thinks she is going through menopause. She does report being tired. Denies  thirst or polyuria. Denies cold or heat intolerance, dry skin, constipation. Reports increased appetite    Patient reports her weight now is 260 LBS  WEIGHT LOSS 20 LBS WITH A WEIGHT CHALLENGE at work. Still feeling tired  Morbid obesity per BMI, BMI 40.72 kg/M2  Prior A1c was normal  Lab Results   Component Value Date    LABA1C 5.4 04/03/2021     Mayco garcia has Vitamin D deficiency. Mayco garcia  is  taking Vitamin D supplementation   she feels tired. Lab Results   Component Value Date    VITD25 27.9 (L) 02/12/2022       Depression is better with Wellbutrin, tolerated well, denies side effects     PHQ-2 Over the past 2 weeks, how often have you been bothered by any of the following problems? Little interest or pleasure in doing things: Not at all  Feeling down, depressed, or hopeless: Not at all  PHQ-2 Score: 0  PHQ-9 Over the past 2 weeks, how often have you been bothered by any of the following problems? Trouble falling or staying asleep, or sleeping too much: Several days  Feeling tired or having little energy: Nearly every day  Poor appetite or overeating: Several days (overeating)  Feeling bad about yourself - or that you are a failure or have let yourself or your family down: Not at all  Trouble concentrating on things, such as reading the newspaper or watching television: Not at all  Moving or speaking so slowly that other people could have noticed.  Or the opposite - being so fidgety or restless that you have been moving around a lot more than usual: Not at all  Thoughts that you would be better off dead, or of hurting yourself in some way: Not at all  If you checked off any problems, how difficult have these problems made it for you to do your work, take care of things at home, or get along with other people?: Not difficult at all  PHQ-9 Total Score: 5  PHQ-9 Total Score: 5    Mild depression      PHQ Scores 8/12/2022 2/9/2022 6/4/2021 3/30/2021 4/22/2020 12/12/2019   PHQ2 Score 0 0 0 2 2 2   PHQ9 Score 5 0 0 2 2 2       Review of Systems   Constitutional:  Negative for activity change, appetite change, chills, diaphoresis, fatigue, fever and unexpected weight change. HENT:  Positive for congestion, postnasal drip, rhinorrhea, sinus pressure and sinus pain. Negative for ear pain and sore throat. Respiratory:  Negative for cough, chest tightness, shortness of breath and wheezing. Cardiovascular:  Negative for chest pain, palpitations and leg swelling. Gastrointestinal:  Negative for abdominal distention, abdominal pain, constipation, diarrhea and nausea. Endocrine: Positive for polyphagia. Negative for cold intolerance, heat intolerance, polydipsia and polyuria. Genitourinary:  Positive for menstrual problem. Allergic/Immunologic: Positive for environmental allergies. Neurological:  Negative for headaches. Psychiatric/Behavioral:  Negative for dysphoric mood, self-injury, sleep disturbance and suicidal ideas. The patient is not nervous/anxious. Prior to Visit Medications    Medication Sig Taking?  Authorizing Provider   vitamin D (ERGOCALCIFEROL) 1.25 MG (36547 UT) CAPS capsule TAKE ONE CAPSULE BY MOUTH ONCE WEEKLY Yes Humphrey Marquez MD   valACYclovir (VALTREX) 500 MG tablet TAKE ONE TABLET BY MOUTH TWICE A DAY FOR 7 DAYS Yes TORIBIO Contreras CNP   buPROPion (WELLBUTRIN XL) 150 MG extended release tablet Babs Knows Yes Humphrey Marquez MD   norethindrone-ethinyl estradiol-iron (BLISOVI FE 1.5/30) 1.5-30 MG-MCG tablet TAKE ONE TABLET BY MOUTH DAILY Yes TORIBIO Uriostegui CNP   cetirizine (ZYRTEC) 10 MG tablet Take 10 mg by mouth daily Indications: OTC  Yes Historical Provider, MD       Social History     Tobacco Use    Smoking status: Never    Smokeless tobacco: Never   Vaping Use    Vaping Use: Never used   Substance Use Topics    Alcohol use: No    Drug use: No          PHYSICAL EXAMINATION:    Vital Signs: (As obtained by patient/caregiver or practitioner observation)  -vital signs stable and within normal limits except Morbid obesity per BMI.40.72 kg/m2    Patient-Reported Vitals 8/12/2022   Patient-Reported Weight 260 lbs   Patient-Reported Height 5 ft 7 in         BP Readings from Last 3 Encounters:   10/12/21 114/84   03/30/21 122/74   12/10/20 114/84           Intensity of pain is:0/10       Constitutional: [x] Appears well-developed and well-nourished [x] No apparent distress      [x] Abnormal- Morbidly obese      Mental status  [x] Alert and awake  [x] Oriented to person/place/time [x]Able to follow commands      Eyes:  EOM    [x]  Normal  [] Abnormal-  Sclera  [x]  Normal  [] Abnormal -         Discharge [x]  None visible  [] Abnormal -    HENT:   [x] Normocephalic, atraumatic.   [x] Abnormal -sounds congested  [x] Mouth/Throat: Mucous membranes are moist.     External Ears [x] Normal  [] Abnormal-     Neck: [x] No visualized mass     Pulmonary/Chest: [x] Respiratory effort normal.  [x] No visualized signs of difficulty breathing or respiratory distress        [] Abnormal        Musculoskeletal:   [x] Normal gait with no signs of ataxia         [x] Normal range of motion of neck        [] Abnormal-       Neurological:        [x] No Facial Asymmetry (Cranial nerve 7 motor function) (limited exam to video visit)          [x] No gaze palsy        [] Abnormal-            Skin:        [x] No significant exanthematous lesions or discoloration noted on facial skin         [] Abnormal-            Psychiatric:      [x] No Hallucinations       [x]Mood is normal      [x]Behavior is normal      [x]Judgment is normal      [x]Thought content is normal       [] Abnormal-     Other pertinent observable physical exam findings- none    Due to this being a TeleHealth encounter, evaluation of the following organ systems is limited: Vitals/Constitutional/EENT/Resp/CV/GI//MS/Neuro/Skin/Heme-Lymph-Imm. I personally reviewed most recent labs reviewed with the patient and all questions fully answered.    Vitamin D deficiency  Otherwise labs within normal limits        Lab Results   Component Value Date    WBC 5.4 02/12/2022    HGB 14.2 02/12/2022    HCT 43.8 02/12/2022    MCV 91.6 02/12/2022     02/12/2022       Lab Results   Component Value Date/Time     02/12/2022 12:45 PM    K 4.3 02/12/2022 12:45 PM     02/12/2022 12:45 PM    CO2 25 02/12/2022 12:45 PM    BUN 9 02/12/2022 12:45 PM    CREATININE 0.59 02/12/2022 12:45 PM    GLUCOSE 90 02/12/2022 12:45 PM    GLUCOSE 92 02/20/2012 09:35 AM    CALCIUM 9.0 02/12/2022 12:45 PM        Lab Results   Component Value Date    ALT 32 02/12/2022    AST 23 02/12/2022    ALKPHOS 66 02/12/2022    BILITOT 0.32 02/12/2022       Lab Results   Component Value Date    TSH 1.68 02/12/2022       Lab Results   Component Value Date    CHOL 150 04/03/2021    CHOL 168 02/20/2012     Lab Results   Component Value Date    TRIG 80 04/03/2021    TRIG 120 02/20/2012     Lab Results   Component Value Date    HDL 46 04/03/2021    HDL 47 12/12/2019    HDL 48 02/20/2012     Lab Results   Component Value Date    LDLCHOLESTEROL 88 04/03/2021    LDLCHOLESTEROL 100 12/12/2019    LDLCHOLESTEROL 96 02/20/2012       Lab Results   Component Value Date    CHOLHDLRATIO 3.3 04/03/2021    CHOLHDLRATIO 3.5 12/12/2019    CHOLHDLRATIO 3.5 02/20/2012       Lab Results   Component Value Date    LABA1C 5.4 04/03/2021         No results found for: [id number]    Lab Results   Component Value Date    VITD25 27.9 (L) 02/12/2022       Orders Placed This Encounter   Medications    COVID-19 Antigen Test KIT     Sig: Larisa Gan, was evaluated through a synchronous (real-time) audio-video encounter. The patient (or guardian if applicable) is aware that this is a billable service, which includes applicable co-pays. The virtual visit was conducted with patient's (and/or legal guardian consent). Verbal consent to proceed has been obtained within the past 12 months. The visit was conducted pursuant to the emergency declaration under the [address] [place] [address], [address] [place] [address] and the [employer] General Act. Patient identification was verified    Patient was alone   Provider was located at primary practice location. The patient was located at home, in a state where the provider was licensed to provide care. On this date 8/12/2022 I have spent 35 minutes reviewing previous notes, test results and face to face with the patient discussing the diagnosis and importance of compliance with the treatment plan as well as documenting on the day of the visit. --Mickey Essex, MD on 8/13/2022 at 5:25 PM    An electronic signature was used to authenticate this note.

## 2022-08-12 NOTE — TELEPHONE ENCOUNTER
Medication  Semaglutide 3 MG TABS [[id number]]    Semaglutide 3 MG TABS [[id number]]     Order Details  Dose: 3 mg Route: Oral Frequency: DAILY   Dispense Quantity: 30 tablet Refills: 0          Sig: Take 3 mg by mouth daily 1st step         Start Date: 08/12/22 End Date: --   Written Date: 08/12/22 Expiration Date: 08/12/23       Diagnosis Association: PCOS (polycystic ovarian syndrome) (E28.2); Class 3 severe obesity without serious comorbidity with body mass index (BMI) of 40.0 to 44.9 in adult, unspecified obesity type ([facility] (E66.01 , Z68.41)     Providers    Authorizing Provider: Zaheer Bazan MD NPI: [id number]   Ordering User:  Zaheer Bazan MD      Will need a PA, PT STATED THAT SHE WILL AWAIT FOR THE APPROVAL. ALSO PT STATED THAT HER AT HOME COVID TEST WAS NEGATIVE. ALSO SHE DID STAY HOME TODAY FROM WORK. ALSO PT DECLINED SCHEDULING FOLLOW UP APPT WITH  RIGHT NOW FOR A PHYSICAL. WILL CALL OFFICE BACK.

## 2022-08-12 NOTE — LETTER
[facility]  [address] [place] [address] [email or web address] [address]  Phone: [phone]  Fax: [phone]    Casey Renee MD        August 12, 2022     Patient: Ro Burn   YOB: 1980   Date of Visit: 8/12/2022       To Whom It May Concern: It is my medical opinion that Ayesha Willoughby should be excused from work 8/12/2022 , awaiting COVID 19 test.    1. Acute viral syndrome            If you have any questions or concerns, please don't hesitate to call.     Sincerely,        Casey Renee MD

## 2022-08-13 ENCOUNTER — HOSPITAL ENCOUNTER (OUTPATIENT)
Age: 42
Discharge: HOME | End: 2022-08-13
Payer: MEDICARE

## 2022-08-13 VITALS — BODY MASS INDEX: 40.81 KG/M2 | HEIGHT: 67 IN | WEIGHT: 260 LBS

## 2022-08-13 DIAGNOSIS — E66.01 CLASS 3 SEVERE OBESITY WITHOUT SERIOUS COMORBIDITY WITH BODY MASS INDEX (BMI) OF 40.0 TO 44.9 IN ADULT, UNSPECIFIED OBESITY TYPE (HCC): ICD-10-CM

## 2022-08-13 DIAGNOSIS — R53.82 CHRONIC FATIGUE: ICD-10-CM

## 2022-08-13 DIAGNOSIS — E28.2 PCOS (POLYCYSTIC OVARIAN SYNDROME): ICD-10-CM

## 2022-08-13 DIAGNOSIS — F33.41 RECURRENT MAJOR DEPRESSIVE DISORDER, IN PARTIAL REMISSION (HCC): ICD-10-CM

## 2022-08-13 DIAGNOSIS — E55.9 VITAMIN D DEFICIENCY: ICD-10-CM

## 2022-08-13 PROBLEM — F34.1 DYSTHYMIA: Status: RESOLVED | Noted: 2020-04-22 | Resolved: 2022-08-13

## 2022-08-13 LAB
CHOLESTEROL/HDL RATIO: 3.7
CHOLESTEROL: 158 MG/DL
FOLLICLE STIMULATING HORMONE: 2.2 MIU/ML (ref 1.7–21.5)
HDLC SERPL-MCNC: 43 MG/DL
INSULIN REFERENCE RANGE:: NORMAL
INSULIN: 12.5 MU/L
LDL CHOLESTEROL: 92 MG/DL (ref 0–130)
TRIGL SERPL-MCNC: 114 MG/DL
TSH SERPL DL<=0.05 MIU/L-ACNC: 2.42 UIU/ML (ref 0.3–5)
VITAMIN D 25-HYDROXY: 26.6 NG/ML

## 2022-08-13 PROCEDURE — 84443 ASSAY THYROID STIM HORMONE: CPT

## 2022-08-13 PROCEDURE — 36415 COLL VENOUS BLD VENIPUNCTURE: CPT

## 2022-08-13 PROCEDURE — 83525 ASSAY OF INSULIN: CPT

## 2022-08-13 PROCEDURE — 80061 LIPID PANEL: CPT

## 2022-08-13 PROCEDURE — 83001 ASSAY OF GONADOTROPIN (FSH): CPT

## 2022-08-13 PROCEDURE — 82306 VITAMIN D 25 HYDROXY: CPT

## 2022-08-13 PROCEDURE — 83036 HEMOGLOBIN GLYCOSYLATED A1C: CPT

## 2022-08-13 ASSESSMENT — ENCOUNTER SYMPTOMS: SORE THROAT: 0

## 2022-08-14 LAB
ESTIMATED AVERAGE GLUCOSE: 105 MG/DL
HBA1C MFR BLD: 5.3 % (ref 4–6)

## 2022-08-17 ENCOUNTER — HOSPITAL ENCOUNTER (OUTPATIENT)
Dept: ULTRASOUND IMAGING | Age: 42
Discharge: HOME | End: 2022-08-19
Payer: MEDICARE

## 2022-08-17 PROCEDURE — 76856 US EXAM PELVIC COMPLETE: CPT

## 2022-08-17 NOTE — RESULT ENCOUNTER NOTE
Please notify patient: Ultrasound of the pelvis shows a 2.7 cm right ovarian follicle which at her age is normal, she is still ovulating    Could schedule her next appointment,   3 months (around 11/12/2022) for Visit type PHYSICAL, VISION screen, PHQ9. Julia Tejeda No future appointments.

## 2022-08-24 NOTE — RESULT ENCOUNTER NOTE
In general ovarian cyst is bigger. A cyst is actually a bigger follicle, but does not contain an egg. When the mature follicle failed to rupture at the time of ovulation, it collects fluid and becomes larger, and becomes a cyst  They both appear as hollow dark spherical object in the ovary on the ultrasound scan, but cyst is larger than the follicle    .   Future Appointments  1/6/2023   1:00 PM    Shalom Blackburn MD     fp sc               [id number]

## 2022-10-24 ENCOUNTER — TELEPHONE (OUTPATIENT)
Dept: OBGYN CLINIC | Age: 42
End: 2022-10-24

## 2022-10-24 DIAGNOSIS — Z30.41 ORAL CONTRACEPTIVE PILL SURVEILLANCE: Primary | ICD-10-CM

## 2022-10-24 RX ORDER — NORETHINDRONE ACETATE AND ETHINYL ESTRADIOL 1.5-30(21)
1 KIT ORAL DAILY
Qty: 1 PACKET | Refills: 1 | Status: SHIPPED | OUTPATIENT
Start: 2022-10-24 | End: 2022-11-22 | Stop reason: SDUPTHER

## 2022-10-24 NOTE — TELEPHONE ENCOUNTER
NEEDS refill today of her BCP due to start new pack tomorrow. Annual scheduled 11/22/22. Previously Kaykay KIRKPATRICK pt and having to get R/S. Rx sent 1 pk with 1 refill.

## 2022-11-11 DIAGNOSIS — E55.9 VITAMIN D DEFICIENCY: ICD-10-CM

## 2022-11-11 RX ORDER — ERGOCALCIFEROL 1.25 MG/1
CAPSULE ORAL
Qty: 4 CAPSULE | Refills: 2 | Status: SHIPPED | OUTPATIENT
Start: 2022-11-11

## 2022-11-22 ENCOUNTER — OFFICE VISIT (OUTPATIENT)
Dept: OBGYN CLINIC | Age: 42
End: 2022-11-22
Payer: MEDICARE

## 2022-11-22 VITALS
BODY MASS INDEX: 45.39 KG/M2 | SYSTOLIC BLOOD PRESSURE: 102 MMHG | WEIGHT: 289.2 LBS | DIASTOLIC BLOOD PRESSURE: 82 MMHG | HEIGHT: 67 IN

## 2022-11-22 DIAGNOSIS — Z12.31 ENCOUNTER FOR SCREENING MAMMOGRAM FOR BREAST CANCER: ICD-10-CM

## 2022-11-22 DIAGNOSIS — Z01.419 ENCOUNTER FOR GYNECOLOGICAL EXAMINATION: Primary | ICD-10-CM

## 2022-11-22 DIAGNOSIS — Z91.89 AT HIGH RISK FOR BREAST CANCER: ICD-10-CM

## 2022-11-22 DIAGNOSIS — E28.2 PCOS (POLYCYSTIC OVARIAN SYNDROME): ICD-10-CM

## 2022-11-22 DIAGNOSIS — Z30.41 ORAL CONTRACEPTIVE PILL SURVEILLANCE: ICD-10-CM

## 2022-11-22 PROCEDURE — 99396 PREV VISIT EST AGE 40-64: CPT | Performed by: NURSE PRACTITIONER

## 2022-11-22 RX ORDER — NORETHINDRONE ACETATE AND ETHINYL ESTRADIOL 1.5-30(21)
1 KIT ORAL DAILY
Qty: 1 PACKET | Refills: 12 | Status: SHIPPED | OUTPATIENT
Start: 2022-11-22 | End: 2023-11-22

## 2022-11-22 ASSESSMENT — ENCOUNTER SYMPTOMS
GASTROINTESTINAL NEGATIVE: 1
ABDOMINAL DISTENTION: 0
SHORTNESS OF BREATH: 0
RESPIRATORY NEGATIVE: 1
COUGH: 0
ALLERGIC/IMMUNOLOGIC NEGATIVE: 1
BACK PAIN: 0

## 2022-11-22 ASSESSMENT — PATIENT HEALTH QUESTIONNAIRE - PHQ9
SUM OF ALL RESPONSES TO PHQ9 QUESTIONS 1 & 2: 0
SUM OF ALL RESPONSES TO PHQ QUESTIONS 1-9: 0
2. FEELING DOWN, DEPRESSED OR HOPELESS: 0
SUM OF ALL RESPONSES TO PHQ QUESTIONS 1-9: 0
1. LITTLE INTEREST OR PLEASURE IN DOING THINGS: 0
SUM OF ALL RESPONSES TO PHQ QUESTIONS 1-9: 0
SUM OF ALL RESPONSES TO PHQ QUESTIONS 1-9: 0

## 2022-11-22 NOTE — ASSESSMENT & PLAN NOTE
She is going to start with 500mg once daily with her largest meal and increase to twice daily after a week.   We will do a 6 month trial and see how she does

## 2022-11-22 NOTE — PROGRESS NOTES
[address] [facility] [place] [address] [facility]  Dept: [phone]      11/22/22    [address] [facility] Annual Exam      CHIEF COMPLAINT:    Chief Complaint   Patient presents with    Annual Exam     Last pap 10/12/21-WNL                 Blood pressure 102/82, height 5' 7\" (1.702 m), weight 289 lb 3.2 oz (131.2 kg), last menstrual period 11/18/2022, not currently breastfeeding. HPI :     Bailey Marin 1980 is a 40 y.o. [id number]  who is here for her annual exam. She is on ocps and gets somewhat irregular cycles. Occasionally she will skip months. Her pcp ordered an ultrasound in August which was unremarkable. She has a hx of pcos and had done metformin in the past which she is interested in trying again because she is struggling with weight loss. She has 3 kids 16/7/5 and left her job in a  after 22 years and now works in a [employer]. She reports still having periods every irregular with ocps days, lasting 3-5 days. Dysmenorrhea- yes   Menorrhagia- no  Denies hot flushes or vaginal dryness. Uses birth control pills for contraception.    _____________________________________________________________________  Past Medical History:   Diagnosis Date    Anxiety     Back pain     Chlamydia     Depression 2013    on antidepressant loss of ectopic pregnancy    Dysthymia 4/22/2020    Herpes simplex without mention of complication     History of blood transfusion 2013    transfusion with ectopic pregnancy    Hypertension     No problem since 1/2015    Infertility, female     Obesity     PCOS (polycystic ovarian syndrome)     Seasonal allergies     Secondary hypertension     Trauma 2015    Uterine disorder 2006    septation noted, but not noted on any subsequent ultrasound                                                                   Past Surgical History:   Procedure Laterality Date ECTOPIC PREGNANCY SURGERY  2013    Left fallopian tube removed    NASAL SEPTUM SURGERY  12    with turbinate reduction and polypectomy, ENT- dr. Oliver Witt       Family History   Problem Relation Age of Onset    Diabetes Paternal Grandfather     Heart Disease Paternal Grandfather     Thyroid Disease Maternal Grandmother     Diabetes Maternal Grandfather     Hypertension Maternal Grandfather     Hypertension Father     Heart Surgery Father     Thyroid Disease Mother     High Blood Pressure Mother     No Known Problems Paternal Grandmother     Breast Cancer Maternal Aunt         in her 48\"s    Breast Cancer Maternal Aunt      Social History     Tobacco Use   Smoking Status Never   Smokeless Tobacco Never     Social History     Substance and Sexual Activity   Alcohol Use No     Current Outpatient Medications   Medication Sig Dispense Refill    norethindrone-ethinyl estradiol-iron (BLISOVI FE 1.) 1.5-30 MG-MCG tablet Take 1 tablet by mouth daily 1 packet 12    metFORMIN (GLUCOPHAGE) 500 MG tablet Take 1 tablet by mouth 2 times daily (with meals) 60 tablet 5    vitamin D (ERGOCALCIFEROL) 1.25 MG (87754 UT) CAPS capsule TAKE ONE CAPSULE BY MOUTH ONCE WEEKLY 4 capsule 2    buPROPion (WELLBUTRIN XL) 150 MG extended release tablet Take 1 tablet by mouth every morning 90 tablet 1    cetirizine (ZYRTEC) 10 MG tablet Take 10 mg by mouth daily Indications: OTC        No current facility-administered medications for this visit. PHQ-9 Total Score: 0 (2022  8:40 AM)     **If either question is answered in a  positive fashion then complete the PHQ9 Scoring Evaluation and make the appropriate referral**        Allergies:  Codeine and Sulfa antibiotics    Gynecologic History:  Patient's last menstrual period was 2022.   Sexually Active:Yes  Dyspareunia: no  Fam Hx Breast, Ovarian, Colorectal Ca: maternal aunt    OB History    Para Term  AB Living   4 2 2 0 2 2   SAB IAB Ectopic Molar Multiple Live Births   1 0 1 0 0 2   Obstetric Comments   1 adopted girl        Review of Systems   Constitutional: Negative. Negative for activity change, appetite change and fatigue. HENT: Negative. Respiratory: Negative. Negative for cough and shortness of breath. Cardiovascular: Negative. Gastrointestinal: Negative. Negative for abdominal distention. Endocrine: Negative. Genitourinary: Negative. Musculoskeletal:  Negative for arthralgias and back pain. Skin: Negative. Allergic/Immunologic: Negative. Neurological:  Negative for dizziness and light-headedness. Physical Exam  Vitals reviewed. Constitutional:       Appearance: Normal appearance. HENT:      Head: Normocephalic. Cardiovascular:      Rate and Rhythm: Normal rate and regular rhythm. Pulmonary:      Effort: Pulmonary effort is normal.      Breath sounds: Normal breath sounds. Chest:   Breasts:     Right: No mass, skin change or tenderness. Left: Normal. No mass, skin change or tenderness. Abdominal:      General: Abdomen is flat. Palpations: Abdomen is soft. Genitourinary:     General: Normal vulva. Labia:         Right: No rash or lesion. Left: No rash or lesion. Urethra: No prolapse. Vagina: Normal. No vaginal discharge or lesions. Cervix: No friability or lesion. Uterus: Normal. Not enlarged and not tender. Adnexa: Right adnexa normal and left adnexa normal.        Right: No mass or tenderness. Left: No mass or tenderness. Musculoskeletal:         General: Normal range of motion. Cervical back: Neck supple. Lymphadenopathy:      Upper Body:      Right upper body: No supraclavicular or axillary adenopathy. Left upper body: No supraclavicular or axillary adenopathy. Skin:     General: Skin is warm and dry. Neurological:      Mental Status: She is alert.    Psychiatric:         Mood and Affect: Mood normal. ASSESSMENT/PLAN    40 y.o. Female; Annual   Diagnosis Orders   1. Encounter for gynecological examination        2. Encounter for screening mammogram for breast cancer  SWETHA WALESKA DIGITAL SCREEN BILATERAL      3. Oral contraceptive pill surveillance  norethindrone-ethinyl estradiol-iron (BLISOVI FE 1.5/30) 1.5-30 MG-MCG tablet      4. PCOS (polycystic ovarian syndrome)  norethindrone-ethinyl estradiol-iron (BLISOVI FE 1.5/30) 1.5-30 MG-MCG tablet    metFORMIN (GLUCOPHAGE) 500 MG tablet      5. At high risk for breast cancer  MRI BREAST BILATERAL W WO CONTRAST          1. Encounter for gynecological examination  2. Encounter for screening mammogram for breast cancer  -     SEWTHA WALESKA DIGITAL SCREEN BILATERAL; Future  3. Oral contraceptive pill surveillance  -     norethindrone-ethinyl estradiol-iron (BLISOVI FE 1.5/30) 1.5-30 MG-MCG tablet; Take 1 tablet by mouth daily, Disp-1 packet, R-12Normal  4. PCOS (polycystic ovarian syndrome)  Assessment & Plan:   She is going to start with 500mg once daily with her largest meal and increase to twice daily after a week. We will do a 6 month trial and see how she does  Orders:  -     norethindrone-ethinyl estradiol-iron (BLISOVI FE 1.5/30) 1.5-30 MG-MCG tablet; Take 1 tablet by mouth daily, Disp-1 packet, R-12Normal  -     metFORMIN (GLUCOPHAGE) 500 MG tablet; Take 1 tablet by mouth 2 times daily (with meals), Disp-60 tablet, R-5Normal  5. At high risk for breast cancer  -     MRI BREAST BILATERAL W WO CONTRAST; Future                     Hereditary Breast, Ovarian, Colon and Uterine Cancer screening Done. Tobacco & Secondary smoke risks reviewed; instructed on cessation and avoidance    - Pap not collected per ASCCP guidelines.  -Discussed menopausal symptoms, HRT, incontinence. - Screening mammogram discussed and advised yearly if normalstarting at age 36.  - Calcium and Vitamin D dosing reviewed. - Colonoscopy screening reviewed.    - General diet and exercise reviewed. - Routine health maintenance per patients PCP.     Return in about 1 year (around 11/22/2023) for annual exam.        Electronically signed by TORIBIO Wynn CNP on 11/22/22 at 8:12 AM EST

## 2022-12-09 ENCOUNTER — PATIENT MESSAGE (OUTPATIENT)
Dept: FAMILY MEDICINE CLINIC | Age: 42
End: 2022-12-09

## 2022-12-09 DIAGNOSIS — F33.41 RECURRENT MAJOR DEPRESSIVE DISORDER, IN PARTIAL REMISSION (HCC): ICD-10-CM

## 2022-12-09 RX ORDER — BUPROPION HYDROCHLORIDE 150 MG/1
150 TABLET ORAL EVERY MORNING
Qty: 90 TABLET | Refills: 1 | Status: SHIPPED | OUTPATIENT
Start: 2022-12-09

## 2022-12-09 RX ORDER — BUPROPION HYDROCHLORIDE 150 MG/1
150 TABLET ORAL EVERY MORNING
Qty: 90 TABLET | Refills: 1 | OUTPATIENT
Start: 2022-12-09

## 2022-12-09 NOTE — TELEPHONE ENCOUNTER
From: Carolin Noriega  To: Dr. Bagley Points: 12/9/2022 9:14 AM EST  Subject: Refill    I have an appointment January 6th but need a refill on buPROPion; I only have 4 pills left.  Thank you  Mayco garcia

## 2023-01-06 ENCOUNTER — OFFICE VISIT (OUTPATIENT)
Dept: FAMILY MEDICINE CLINIC | Age: 43
End: 2023-01-06

## 2023-01-06 VITALS
HEIGHT: 67 IN | TEMPERATURE: 97.4 F | WEIGHT: 287.4 LBS | HEART RATE: 97 BPM | OXYGEN SATURATION: 97 % | BODY MASS INDEX: 45.11 KG/M2 | DIASTOLIC BLOOD PRESSURE: 84 MMHG | SYSTOLIC BLOOD PRESSURE: 132 MMHG

## 2023-01-06 DIAGNOSIS — Z28.21 INFLUENZA VACCINATION DECLINED: ICD-10-CM

## 2023-01-06 DIAGNOSIS — R53.82 CHRONIC FATIGUE: ICD-10-CM

## 2023-01-06 DIAGNOSIS — Z00.01 ENCOUNTER FOR WELL ADULT EXAM WITH ABNORMAL FINDINGS: Primary | ICD-10-CM

## 2023-01-06 DIAGNOSIS — E55.9 VITAMIN D DEFICIENCY: ICD-10-CM

## 2023-01-06 DIAGNOSIS — E66.01 MORBID OBESITY WITH BMI OF 45.0-49.9, ADULT (HCC): ICD-10-CM

## 2023-01-06 DIAGNOSIS — F33.41 RECURRENT MAJOR DEPRESSIVE DISORDER, IN PARTIAL REMISSION (HCC): ICD-10-CM

## 2023-01-06 DIAGNOSIS — K21.9 GASTROESOPHAGEAL REFLUX DISEASE WITHOUT ESOPHAGITIS: ICD-10-CM

## 2023-01-06 DIAGNOSIS — Z28.21 COVID-19 VACCINATION DECLINED: ICD-10-CM

## 2023-01-06 PROBLEM — L65.9 HAIR LOSS: Status: RESOLVED | Noted: 2022-02-10 | Resolved: 2023-01-06

## 2023-01-06 RX ORDER — BUPROPION HYDROCHLORIDE 150 MG/1
150 TABLET ORAL EVERY MORNING
Qty: 90 TABLET | Refills: 3 | Status: SHIPPED | OUTPATIENT
Start: 2023-01-06

## 2023-01-06 SDOH — ECONOMIC STABILITY: FOOD INSECURITY: WITHIN THE PAST 12 MONTHS, THE FOOD YOU BOUGHT JUST DIDN'T LAST AND YOU DIDN'T HAVE MONEY TO GET MORE.: NEVER TRUE

## 2023-01-06 SDOH — ECONOMIC STABILITY: FOOD INSECURITY: WITHIN THE PAST 12 MONTHS, YOU WORRIED THAT YOUR FOOD WOULD RUN OUT BEFORE YOU GOT MONEY TO BUY MORE.: NEVER TRUE

## 2023-01-06 ASSESSMENT — SOCIAL DETERMINANTS OF HEALTH (SDOH): HOW HARD IS IT FOR YOU TO PAY FOR THE VERY BASICS LIKE FOOD, HOUSING, MEDICAL CARE, AND HEATING?: NOT HARD AT ALL

## 2023-01-06 ASSESSMENT — PATIENT HEALTH QUESTIONNAIRE - PHQ9
1. LITTLE INTEREST OR PLEASURE IN DOING THINGS: 0
10. IF YOU CHECKED OFF ANY PROBLEMS, HOW DIFFICULT HAVE THESE PROBLEMS MADE IT FOR YOU TO DO YOUR WORK, TAKE CARE OF THINGS AT HOME, OR GET ALONG WITH OTHER PEOPLE: 1
SUM OF ALL RESPONSES TO PHQ9 QUESTIONS 1 & 2: 0
SUM OF ALL RESPONSES TO PHQ QUESTIONS 1-9: 3
8. MOVING OR SPEAKING SO SLOWLY THAT OTHER PEOPLE COULD HAVE NOTICED. OR THE OPPOSITE, BEING SO FIGETY OR RESTLESS THAT YOU HAVE BEEN MOVING AROUND A LOT MORE THAN USUAL: 0
4. FEELING TIRED OR HAVING LITTLE ENERGY: 1
2. FEELING DOWN, DEPRESSED OR HOPELESS: 0
9. THOUGHTS THAT YOU WOULD BE BETTER OFF DEAD, OR OF HURTING YOURSELF: 0
3. TROUBLE FALLING OR STAYING ASLEEP: 1
5. POOR APPETITE OR OVEREATING: 1
7. TROUBLE CONCENTRATING ON THINGS, SUCH AS READING THE NEWSPAPER OR WATCHING TELEVISION: 0
SUM OF ALL RESPONSES TO PHQ QUESTIONS 1-9: 3
SUM OF ALL RESPONSES TO PHQ QUESTIONS 1-9: 3
6. FEELING BAD ABOUT YOURSELF - OR THAT YOU ARE A FAILURE OR HAVE LET YOURSELF OR YOUR FAMILY DOWN: 0
SUM OF ALL RESPONSES TO PHQ QUESTIONS 1-9: 3

## 2023-01-06 ASSESSMENT — VISUAL ACUITY
OD_CC: 20/25
OS_CC: 20/20

## 2023-01-06 ASSESSMENT — ENCOUNTER SYMPTOMS
NAUSEA: 0
COUGH: 0
ABDOMINAL DISTENTION: 0
CHEST TIGHTNESS: 0
DIARRHEA: 0
CONSTIPATION: 0
WHEEZING: 0
SHORTNESS OF BREATH: 0
VOMITING: 0
BACK PAIN: 0
ABDOMINAL PAIN: 1
BLOOD IN STOOL: 0

## 2023-01-06 NOTE — PATIENT INSTRUCTIONS
Well Visit, Ages 25 to 72: Care Instructions  Well visits can help you stay healthy. Your doctor has checked your overall health and may have suggested ways to take good care of yourself. Your doctor also may have recommended tests. You can help prevent illness with healthy eating, good sleep, vaccinations, regular exercise, and other steps. Get the tests that you and your doctor decide on. Depending on your age and risks, examples might include screening for diabetes; hepatitis C; HIV; and cervical, breast, lung, and colon cancer. Screening helps find diseases before any symptoms appear. Eat healthy foods. Choose fruits, vegetables, whole grains, lean protein, and low-fat dairy foods. Limit saturated fat and reduce salt. Limit alcohol. Men should have no more than 2 drinks a day. Women should have no more than 1. For some people, no alcohol is the best choice. Exercise. Get at least 30 minutes of exercise on most days of the week. Walking can be a good choice. Reach and stay at your healthy weight. This will lower your risk for many health problems. Take care of your mental health. Try to stay connected with friends, family, and community, and find ways to manage stress. If you're feeling depressed or hopeless, talk to someone. A counselor can help. If you don't have a counselor, talk to your doctor. Talk to your doctor if you think you may have a problem with alcohol or drug use. This includes prescription medicines and illegal drugs. Avoid tobacco and nicotine: Don't smoke, vape, or chew. If you need help quitting, talk to your doctor. Practice safer sex. Getting tested, using condoms or dental dams, and limiting sex partners can help prevent STIs. Use birth control if it's important to you to prevent pregnancy. Talk with your doctor about your choices and what might be best for you. Prevent problems where you can.  Protect your skin from too much sun, wash your hands, brush your teeth twice a day, and wear a seat belt in the car. Where can you learn more? Go to http://www.rodriguez.com/ and enter P072 to learn more about \"Well Visit, Ages 25 to 72: Care Instructions. \"  Current as of: March 9, 2022               Content Version: 13.5  © 9366-0701 [employer], [employer]. Care instructions adapted under license by [facility] ([facility]). If you have questions about a medical condition or this instruction, always ask your healthcare professional. [address] any warranty or liability for your use of this information.

## 2023-01-06 NOTE — PROGRESS NOTES
Visit Information    Have you changed or started any medications since your last visit including any over-the-counter medicines, vitamins, or herbal medicines? no   Have you stopped taking any of your medications? Is so, why? -  no  Are you having any side effects from any of your medications? - no    Have you seen any other physician or provider since your last visit?  no   Have you had any other diagnostic tests since your last visit?  no   Have you been seen in the emergency room and/or had an admission in a hospital since we last saw you?  no   Have you had your routine dental cleaning in the past 6 months?  yes -      Do you have an active MyChart account? If no, what is the barrier?   Yes    Patient Care Team:  Celina Brito MD as PCP - General (Family Medicine)  Celina Brito MD as PCP - [facility]  Kiel Pinon MD as Obstetrician (Perinatology)    Medical History Review  Past Medical, Family, and Social History reviewed and does contribute to the patient presenting condition    Health Maintenance   Topic Date Due    COVID-19 Vaccine (1) Never done    Flu vaccine (1) Never done    Depression Monitoring  11/22/2023    Cervical cancer screen  10/12/2024    Lipids  08/13/2027    DTaP/Tdap/Td vaccine (2 - Td or Tdap) 11/07/2027    Hepatitis C screen  Completed    HIV screen  Completed    Hepatitis A vaccine  Aged Out    Hib vaccine  Aged Out    Meningococcal (ACWY) vaccine  Aged Out    Pneumococcal 0-64 years Vaccine  Aged Out    Varicella vaccine  Discontinued

## 2023-01-06 NOTE — PROGRESS NOTES
2023      Dylan Torrez (:  1980) is a 40 y.o. female, here for a preventive medicine evaluation, Annual Exam, Immunizations (NO TO ALL VACCINES DUE), Fatigue, and GI Problem   . ASSESSMENT/PLAN:    1. Encounter for well adult exam with abnormal findings  Low carb, low fat diet, increase fruits and vegetables, and exercise 4-5 times a week 30-40 minutes a day, or walk 1-2 hours per day, or wear a pedometer and get at least 10,000 steps per day. Dental exam 2-3 times /year advised. Immunizations reviewed. Health Maintenance reviewed   Smoking cessation counseling given, not to ever start smoking     Annual eye exam advised. 2. Recurrent major depressive disorder, in partial [facility] ([facility]  Stable  Will continue to monitor.   -continue     buPROPion (WELLBUTRIN XL) 150 MG extended release tablet; Take 1 tablet by mouth every morning, Disp-90 tablet, R-3Normal  3. Gastroesophageal reflux disease without esophagitis  New, ongoing  -     H. Pylori Breath Test, Adult; Future  Will need PPI or Pepcid, if negative test    -     IN OFFICE/OUTPATIENT ESTABLISHED LOW MDM 20-29 MIN  4. Chronic fatigue  Failing to resolve. Will do basic labs to rule out certain common medical conditions: hematologic, renal, hepatic, electrolyte imbalances, thyroid disorders.    -     CBC; Future  -     Comprehensive Metabolic Panel; Future  -     TSH; Future  -     IN OFFICE/OUTPATIENT ESTABLISHED LOW MDM 20-29 MIN  5. Vitamin D deficiency  Unsure if improving or not. Will recheck level  Continue supplementation.    -     Vitamin D 25 Hydroxy; Future  -     IN OFFICE/OUTPATIENT ESTABLISHED LOW MDM 20-29 MIN  6. Influenza vaccination declined, despite counseling  7. COVID-19 vaccination declined, despite counseling  8.  Morbid obesity with BMI of 45.0-49.9, adult ([facility]    worsening  Wt Readings from Last 3 Encounters:   23 287 lb 6.4 oz (130.4 kg)   22 289 lb 3.2 oz (131.2 kg)   22 260 lb (117.9 kg)   Continue wellbutrin and Metformin   Low carb, low fat diet, increase fruits and vegetables, and exercise 4-5 times a week 30-40 minutes a day, or walk 1-2 hours per day, or wear a pedometer and get at least 10,000 steps per day. Haswell received counseling on the following healthy behaviors: nutrition, exercise, medication adherence, and weight loss    Reviewed prior labs and health maintenance  Discussed use, benefit, and side effects of prescribed medications. Barriers to medication compliance addressed. Patient given educational materials - see patient instructions  All patient questions answered. Patient voiced understanding. The patient's past medical, surgical, social, and family history as well as her  current medications and allergies were reviewed as documented in today's encounter. Medications, labs, diagnostic studies, consultations and follow-up as documented in thisencounter. Return in 1 year (on 1/7/2024) for Visit type PHYSICAL, VISION screen, PHQ9. .    Data Unavailable    Future Appointments   Date Time Provider Morales Morgan   1/12/2023  6:30 PM [facility] RM 1 [facility]           Patient Active Problem List   Diagnosis    PCOS (polycystic ovarian syndrome)    History of ectopic pregnancy    Near syncope    Morbid obesity with BMI of 45.0-49.9, adult (HCC)    Chronic fatigue    Recurrent major depressive disorder, in full remission ([facility]    COVID-19 vaccination declined    TRACIE positive    Encounter for counseling regarding contraception    Vitamin D deficiency    Gastroesophageal reflux disease without esophagitis    Influenza vaccination declined       Prior to Visit Medications    Medication Sig Taking?  Authorizing Provider   buPROPion (WELLBUTRIN XL) 150 MG extended release tablet Take 1 tablet by mouth every morning Yes Stu Perez MD   norethindrone-ethinyl estradiol-iron (BLISOVI FE 1.5/30) 1.5-30 MG-MCG tablet Take 1 tablet by mouth daily Yes TORIBIO Borrego CNP   metFORMIN (GLUCOPHAGE) 500 MG tablet Take 1 tablet by mouth 2 times daily (with meals) Yes TORIBIO Borrego CNP   vitamin D (ERGOCALCIFEROL) 1.25 MG (79678 UT) CAPS capsule TAKE ONE CAPSULE BY MOUTH ONCE WEEKLY Yes Celina Brito MD   cetirizine (ZYRTEC) 10 MG tablet Take 10 mg by mouth daily Indications: OTC  Yes Historical Provider, MD        Allergies   Allergen Reactions    Codeine Nausea And Vomiting    Sulfa Antibiotics Rash       Past Medical History:   Diagnosis Date    Anxiety     Back pain     Chlamydia     Depression 2013    on antidepressant loss of ectopic pregnancy    Dysthymia 4/22/2020    Herpes simplex without mention of complication     History of blood transfusion 2013    transfusion with ectopic pregnancy    Hypertension     No problem since 1/2015    Infertility, female     Obesity     PCOS (polycystic ovarian syndrome)     Seasonal allergies     Secondary hypertension     Trauma 2015    Uterine disorder 2006    septation noted, but not noted on any subsequent ultrasound       Past Surgical History:   Procedure Laterality Date    ECTOPIC PREGNANCY SURGERY  7/2013    Left fallopian tube removed    NASAL SEPTUM SURGERY  11/12/12    with turbinate reduction and polypectomy, ENT- dr. Clement Madird         .   Social History     Tobacco Use    Smoking status: Never    Smokeless tobacco: Never   Vaping Use    Vaping Use: Never used   Substance Use Topics    Alcohol use: No    Drug use: No       Family History   Problem Relation Age of Onset    Thyroid Disease Mother     High Blood Pressure Mother     Hypertension Father     Heart Surgery Father     Thyroid Disease Maternal Grandmother     Diabetes Maternal Grandfather     Hypertension Maternal Grandfather     No Known Problems Paternal Grandmother     Diabetes Paternal Grandfather     Heart Disease Paternal Grandfather     Breast Cancer Maternal Aunt         in her 48\"s    Breast Cancer Maternal Aunt     Colon Cancer Neg Hx        ADVANCE DIRECTIVE: N, <no information>    ANITA / Eddy Kumar is here for Annual Exam, Immunizations (NO TO ALL VACCINES DUE), Fatigue, and GI Problem     Depression is stable  Take wellbutrin, Tolerates medications well, denies side effects. PHQ-2 Over the past 2 weeks, how often have you been bothered by any of the following problems? Little interest or pleasure in doing things: Not at all  Feeling down, depressed, or hopeless: Not at all  PHQ-2 Score: 0  PHQ-9 Over the past 2 weeks, how often have you been bothered by any of the following problems? Trouble falling or staying asleep, or sleeping too much: Several days  Feeling tired or having little energy: Several days  Poor appetite or overeating: Several days  Feeling bad about yourself - or that you are a failure or have let yourself or your family down: Not at all  Trouble concentrating on things, such as reading the newspaper or watching television: Not at all  Moving or speaking so slowly that other people could have noticed. Or the opposite - being so fidgety or restless that you have been moving around a lot more than usual: Not at all  Thoughts that you would be better off dead, or of hurting yourself in some way: Not at all  If you checked off any problems, how difficult have these problems made it for you to do your work, take care of things at home, or get along with other people?: Somewhat difficult  PHQ-9 Total Score: 3  PHQ-9 Total Score: 3    PHQ Scores 1/6/2023 11/22/2022 8/12/2022 2/9/2022 6/4/2021 3/30/2021 4/22/2020   PHQ2 Score 0 0 0 0 0 2 2   PHQ9 Score 3 0 5 0 0 2 2     She says she is always tired  Has 3 young children. Denies increased appetite, thirst or polyuria. Denies increased appetite, thirst or polyuria.   She reports good sleep  Reports  unintentional weight gain   Started Metformin BID in December per GYN for possible , not getting monthly periods while on PCOS, and to help her lose weight. Prior A1c is within normal limits     Lab Results   Component Value Date    LABA1C 5.3 08/13/2022    LABA1C 5.4 04/03/2021       Douglas Aaron has Vitamin D deficiency. Douglas Aaron  is  taking Vitamin D supplementation   she feels tired. Lab Results   Component Value Date    VITD25 26.6 (L) 08/13/2022     Reports Stomach pain, on and off, and uncomfortable stomach feeling. She says she had [employer] toast, which made stomach issues it worse afterwards. Onset of symptoms  about 3 months ago, not getting better. Reports acid reflux and heartburn  Denies nausea and vomiting     Urinary symptoms: no    Sexually active: Yes     Contraception: OCP, not getting her periods every month    last pap: was normal  The patient has NO history of abnormal PAP    Last mammogram: alternating MRIs with mammogram  The patient has  family history of breast cancer    Wears seatbelts: yes     Regular exercise: yes  Ever been transfused or tattooed?: had a transfusion in 2013 for ectopic pregnancy  The patient reports that domestic violence in her life is absent. Last eye exam: up to date: No    Abnormal visual screening. I advised Zunilda Patrick to make appointment with ophthalmologist. The patient verbalizes understanding and agrees with the plan. Vision Screening    Right eye Left eye Both eyes   Without correction      With correction 20/25 20/20 20/15       Hearing:normal :Yes    Last dental exam and preventative dental cleaning: up to date : Yes    Nutritional assessment: Body mass index is 45.01 kg/m². Morbid obesity per BMI.  .     Weight is increased,  unintentional weight gain of 16 lbs in 2 year  Wt Readings from Last 3 Encounters:   01/06/23 287 lb 6.4 oz (130.4 kg)   11/22/22 289 lb 3.2 oz (131.2 kg)   08/13/22 260 lb (117.9 kg)   Wt 271 lb 3.2 oz (123 kg) on 3/30/2021    Healthful diet and Physical activity counseling to prevent CVD- low carb, low fat diet, increase fruits and vegetables, and exercise 4-5 times a day 30-40minutes a day discussed    Nicotine dependence. no    Counseling given: Yes      Alcohol use: yes - rarely    Immunization History   Administered Date(s) Administered    Tdap (Boostrix, Adacel) 11/07/2017       COVID-19 vaccine:  No: declines     Tdap one time, then Td every 10 years-up to date:  Yes    Influenza annually-up to date:  No: declines    PPSV 23 in all adults 19-63 yo with chronic conditions,smokers, alcoholism,  nursing home residents; then PCV 13 at 72 yo-up to date:   N/A    Menopause: No   No LMP recorded.      Colon cancer screening recommended at 40years old     The patient has NO family history of colon cancer    Blood pressure is normal.  BP Readings from Last 3 Encounters:   01/06/23 132/84   11/22/22 102/82   10/12/21 114/84       Pulse is normal.  Pulse Readings from Last 3 Encounters:   01/06/23 97   03/30/21 92   04/22/20 82       A1c is within normal limits   Lab Results   Component Value Date    LABA1C 5.3 08/13/2022    LABA1C 5.4 04/03/2021       Lipid screening -within normal limits     Lab Results   Component Value Date    CHOL 158 08/13/2022    CHOL 150 04/03/2021    CHOL 168 02/20/2012     Lab Results   Component Value Date    TRIG 114 08/13/2022    TRIG 80 04/03/2021    TRIG 120 02/20/2012     Lab Results   Component Value Date    HDL 43 08/13/2022    HDL 46 04/03/2021    HDL 47 12/12/2019     Lab Results   Component Value Date    LDLCHOLESTEROL 92 08/13/2022    LDLCHOLESTEROL 88 04/03/2021    LDLCHOLESTEROL 100 12/12/2019     Lab Results   Component Value Date    CHOLHDLRATIO 3.7 08/13/2022    CHOLHDLRATIO 3.3 04/03/2021    CHOLHDLRATIO 3.5 12/12/2019       Cardiovascular risk is: low    The 10-year ASCVD risk score (Tre MANTILLA, et al., 2019) is: 0.6%    Values used to calculate the score:      Age: 40 years      Sex: Female      Is Non- : No      Diabetic: No      Tobacco smoker: No      Systolic Blood Pressure: 132 mmHg      Is BP treated: No      HDL Cholesterol: 43 mg/dL      Total Cholesterol: 158 mg/dL    Hepatitis C screening- nonreactive    Lab Results   Component Value Date    HEPCAB NONREACTIVE 04/03/2021       Health Maintenance   Topic Date Due    COVID-19 Vaccine (1) Never done    Flu vaccine (1) 06/30/2023 (Originally 8/1/2022)    Depression Monitoring  11/22/2023    Cervical cancer screen  10/12/2024    Lipids  08/13/2027    DTaP/Tdap/Td vaccine (2 - Td or Tdap) 11/07/2027    Hepatitis C screen  Completed    HIV screen  Completed    Hepatitis A vaccine  Aged Out    Hib vaccine  Aged Out    Meningococcal (ACWY) vaccine  Aged Out    Pneumococcal 0-64 years Vaccine  Aged Out    Varicella vaccine  Discontinued       -rest of complaints with corresponding details per ROS    Review of Systems   Constitutional:  Positive for fatigue and unexpected weight change. Negative for activity change, appetite change, chills, diaphoresis and fever. HENT:  Negative for congestion, dental problem and hearing loss. Eyes:  Positive for visual disturbance. Respiratory:  Negative for cough, chest tightness, shortness of breath and wheezing. Cardiovascular:  Negative for chest pain, palpitations and leg swelling. Gastrointestinal:  Positive for abdominal pain. Negative for abdominal distention, blood in stool, constipation, diarrhea, nausea and vomiting. Heartburn   Endocrine: Negative for cold intolerance, heat intolerance, polydipsia, polyphagia and polyuria. Genitourinary:  Positive for menstrual problem. Negative for difficulty urinating, dysuria, frequency, urgency and vaginal pain. Musculoskeletal:  Negative for arthralgias, back pain and myalgias. Skin:  Negative for rash. Allergic/Immunologic: Negative for environmental allergies. Neurological:  Negative for dizziness, weakness and numbness. Hematological:  Does not bruise/bleed easily.    Psychiatric/Behavioral:  Negative for dysphoric mood and sleep disturbance. The patient is not nervous/anxious.        -vital signs stable and within normal limits except Morbid obesity per BMI. /84   Pulse 97   Temp 97.4 °F (36.3 °C)   Ht 5' 7\" (1.702 m)   Wt 287 lb 6.4 oz (130.4 kg)   SpO2 97%   BMI 45.01 kg/m²   Vitals:    01/06/23 1310 01/06/23 1332   BP: (!) 138/94 132/84   Pulse: 97    Temp: 97.4 °F (36.3 °C)    SpO2: 97%    Weight: 287 lb 6.4 oz (130.4 kg)    Height: 5' 7\" (1.702 m)      Estimated body mass index is 45.01 kg/m² as calculated from the following:    Height as of this encounter: 5' 7\" (1.702 m). Weight as of this encounter: 287 lb 6.4 oz (130.4 kg). Physical Exam  Vitals and nursing note reviewed. Constitutional:       General: She is not in acute distress. Appearance: Normal appearance. She is well-developed. She is obese. She is not diaphoretic. HENT:      Head: Normocephalic and atraumatic. Right Ear: Hearing, tympanic membrane, ear canal and external ear normal.      Left Ear: Hearing, tympanic membrane, ear canal and external ear normal.      Nose: No mucosal edema. Mouth/Throat:      Comments: I did not examine the mouth due to coronavirus pandemic and wearing masks. Eyes:      General: Lids are normal. No scleral icterus. Right eye: No discharge. Left eye: No discharge. Extraocular Movements: Extraocular movements intact. Conjunctiva/sclera: Conjunctivae normal.   Neck:      Thyroid: No thyromegaly. Cardiovascular:      Rate and Rhythm: Normal rate and regular rhythm. Heart sounds: Normal heart sounds. No murmur heard. Pulmonary:      Effort: Pulmonary effort is normal. No respiratory distress. Breath sounds: Normal breath sounds. No wheezing or rales. Chest:      Chest wall: No tenderness. Abdominal:      General: Bowel sounds are normal. There is no distension. Palpations: Abdomen is soft. There is no hepatomegaly or splenomegaly. Tenderness:  There is no abdominal tenderness. Comments: Obese abdomen. Musculoskeletal:         General: No tenderness. Normal range of motion. Cervical back: Normal range of motion and neck supple. Right lower leg: No edema. Left lower leg: No edema. Skin:     General: Skin is warm and dry. Capillary Refill: Capillary refill takes less than 2 seconds. Findings: No rash. Neurological:      Mental Status: She is alert and oriented to person, place, and time. Cranial Nerves: No cranial nerve deficit. Motor: No abnormal muscle tone. Gait: Gait normal.      Deep Tendon Reflexes: Reflexes normal.      Reflex Scores:       Patellar reflexes are 2+ on the right side and 2+ on the left side. Psychiatric:         Mood and Affect: Mood normal.         Behavior: Behavior normal.         Thought Content: Thought content normal.         Judgment: Judgment normal.           I personally reviewed testing with patient.   Vitamin D deficiency      Otherwise labs within normal limits      Lab Results   Component Value Date    WBC 5.4 02/12/2022    HGB 14.2 02/12/2022    HCT 43.8 02/12/2022    MCV 91.6 02/12/2022     02/12/2022       Lab Results   Component Value Date/Time     02/12/2022 12:45 PM    K 4.3 02/12/2022 12:45 PM     02/12/2022 12:45 PM    CO2 25 02/12/2022 12:45 PM    BUN 9 02/12/2022 12:45 PM    CREATININE 0.59 02/12/2022 12:45 PM    GLUCOSE 90 02/12/2022 12:45 PM    GLUCOSE 92 02/20/2012 09:35 AM    CALCIUM 9.0 02/12/2022 12:45 PM        Lab Results   Component Value Date    ALT 32 02/12/2022    AST 23 02/12/2022    ALKPHOS 66 02/12/2022    BILITOT 0.32 02/12/2022       Lab Results   Component Value Date    TSH 2.42 08/13/2022       Lab Results   Component Value Date    LDLCHOLESTEROL 92 08/13/2022       Lab Results   Component Value Date    LABA1C 5.3 08/13/2022       No results found for: [id number]    No results found for: FOLATE    No results found for: IRON, TIBC, FERRITIN    Lab Results   Component Value Date    VITD25 26.6 (L) 08/13/2022         Orders Placed This Encounter   Medications    buPROPion (WELLBUTRIN XL) 150 MG extended release tablet     Sig: Take 1 tablet by mouth every morning     Dispense:  90 tablet     Refill:  3         Medications Discontinued During This Encounter   Medication Reason    buPROPion (WELLBUTRIN XL) 150 MG extended release tablet REORDER         Orders Placed This Encounter   Procedures    H. Pylori Breath Test, Adult     Standing Status:   Future     Standing Expiration Date:   1/6/2024    CBC     Standing Status:   Future     Standing Expiration Date:   1/6/2024    Comprehensive Metabolic Panel     Standing Status:   Future     Standing Expiration Date:   1/6/2024    TSH     Standing Status:   Future     Standing Expiration Date:   1/6/2024    Vitamin D 25 Hydroxy     Standing Status:   Future     Standing Expiration Date:   1/6/2024    MT OFFICE/OUTPATIENT ESTABLISHED LOW MDM 20-29 MIN           This note was completed by using the assistance of a speech-recognition program. However, inadvertent computerized transcription errors may be present. Although every effort was made to ensure accuracy, no guarantees can be provided that every mistake has been identified and corrected by editing. An electronic signature was used to authenticate this note.     Electronically signed by Leesa Gray MD on 1/6/2023 at 11:09 PM

## 2023-01-07 ENCOUNTER — HOSPITAL ENCOUNTER (OUTPATIENT)
Age: 43
Discharge: HOME | End: 2023-01-07
Payer: MEDICARE

## 2023-01-07 DIAGNOSIS — E55.9 VITAMIN D DEFICIENCY: ICD-10-CM

## 2023-01-07 DIAGNOSIS — R53.82 CHRONIC FATIGUE: ICD-10-CM

## 2023-01-07 LAB
ALBUMIN SERPL-MCNC: 3.8 G/DL (ref 3.5–5.2)
ALBUMIN/GLOBULIN RATIO: 1.2 (ref 1–2.5)
ALP BLD-CCNC: 54 U/L (ref 35–104)
ALT SERPL-CCNC: 31 U/L (ref 5–33)
ANION GAP SERPL CALCULATED.3IONS-SCNC: 11 MMOL/L (ref 9–17)
AST SERPL-CCNC: 21 U/L
BILIRUB SERPL-MCNC: 0.2 MG/DL (ref 0.3–1.2)
BUN BLDV-MCNC: 10 MG/DL (ref 6–20)
CALCIUM SERPL-MCNC: 8.8 MG/DL (ref 8.6–10.4)
CHLORIDE BLD-SCNC: 105 MMOL/L (ref 98–107)
CO2: 24 MMOL/L (ref 20–31)
CREAT SERPL-MCNC: 0.72 MG/DL (ref 0.5–0.9)
GFR SERPL CREATININE-BSD FRML MDRD: >60 ML/MIN/1.73M2
GLUCOSE BLD-MCNC: 86 MG/DL (ref 70–99)
HCT VFR BLD CALC: 41.6 % (ref 36.3–47.1)
HEMOGLOBIN: 14.1 G/DL (ref 11.9–15.1)
MCH RBC QN AUTO: 30 PG (ref 25.2–33.5)
MCHC RBC AUTO-ENTMCNC: 33.9 G/DL (ref 28.4–34.8)
MCV RBC AUTO: 88.5 FL (ref 82.6–102.9)
NRBC AUTOMATED: 0 PER 100 WBC
PDW BLD-RTO: 12.2 % (ref 11.8–14.4)
PLATELET # BLD: 394 K/UL (ref 138–453)
PMV BLD AUTO: 9.1 FL (ref 8.1–13.5)
POTASSIUM SERPL-SCNC: 3.8 MMOL/L (ref 3.7–5.3)
RBC # BLD: 4.7 M/UL (ref 3.95–5.11)
SODIUM BLD-SCNC: 140 MMOL/L (ref 135–144)
TOTAL PROTEIN: 6.9 G/DL (ref 6.4–8.3)
TSH SERPL DL<=0.05 MIU/L-ACNC: 2.1 UIU/ML (ref 0.3–5)
WBC # BLD: 6.2 K/UL (ref 3.5–11.3)

## 2023-01-07 PROCEDURE — 80053 COMPREHEN METABOLIC PANEL: CPT

## 2023-01-07 PROCEDURE — 85027 COMPLETE CBC AUTOMATED: CPT

## 2023-01-07 PROCEDURE — 82306 VITAMIN D 25 HYDROXY: CPT

## 2023-01-07 PROCEDURE — 84443 ASSAY THYROID STIM HORMONE: CPT

## 2023-01-07 PROCEDURE — 36415 COLL VENOUS BLD VENIPUNCTURE: CPT

## 2023-01-08 LAB — VITAMIN D 25-HYDROXY: 51 NG/ML

## 2023-01-08 NOTE — RESULT ENCOUNTER NOTE
Please notify patient:  labs within normal limits    Future Appointments  1/12/2023  6:30 PM    [facility] RM 1             [facility]

## 2023-01-12 ENCOUNTER — HOSPITAL ENCOUNTER (OUTPATIENT)
Dept: MAMMOGRAPHY | Age: 43
Discharge: HOME | End: 2023-01-14
Payer: MEDICARE

## 2023-01-12 DIAGNOSIS — Z12.31 ENCOUNTER FOR SCREENING MAMMOGRAM FOR BREAST CANCER: ICD-10-CM

## 2023-01-12 PROCEDURE — 77063 BREAST TOMOSYNTHESIS BI: CPT

## 2023-01-21 ENCOUNTER — HOSPITAL ENCOUNTER (OUTPATIENT)
Age: 43
Discharge: HOME | End: 2023-01-21
Payer: MEDICARE

## 2023-01-21 DIAGNOSIS — K21.9 GASTROESOPHAGEAL REFLUX DISEASE WITHOUT ESOPHAGITIS: ICD-10-CM

## 2023-01-21 PROCEDURE — 83013 H PYLORI (C-13) BREATH: CPT

## 2023-02-10 DIAGNOSIS — E55.9 VITAMIN D DEFICIENCY: ICD-10-CM

## 2023-02-10 RX ORDER — ERGOCALCIFEROL 1.25 MG/1
CAPSULE ORAL
Qty: 4 CAPSULE | Refills: 2 | Status: SHIPPED | OUTPATIENT
Start: 2023-02-10

## 2023-02-10 NOTE — TELEPHONE ENCOUNTER
Please Approve or Refuse.   Send to Pharmacy per Pt's Request:      Next Visit Date:  Visit date not found   Last Visit Date: 1/6/2023    Hemoglobin A1C (%)   Date Value   08/13/2022 5.3   04/03/2021 5.4             ( goal A1C is < 7)   BP Readings from Last 3 Encounters:   01/06/23 132/84   11/22/22 102/82   10/12/21 114/84          (goal 120/80)  BUN   Date Value Ref Range Status   01/07/2023 10 6 - 20 mg/dL Final     Creatinine   Date Value Ref Range Status   01/07/2023 0.72 0.50 - 0.90 mg/dL Final     Potassium   Date Value Ref Range Status   01/07/2023 3.8 3.7 - 5.3 mmol/L Final

## 2023-05-29 DIAGNOSIS — E28.2 PCOS (POLYCYSTIC OVARIAN SYNDROME): ICD-10-CM

## 2023-05-30 NOTE — TELEPHONE ENCOUNTER
5/30/2023    GYN pt  Last seen: 11/22/2022  Last annual: 11-22-22  Last refill: 11-22-22    Next appt: Visit date not found    Additional notes:  N/A

## 2023-05-31 DIAGNOSIS — F33.41 RECURRENT MAJOR DEPRESSIVE DISORDER, IN PARTIAL REMISSION (HCC): ICD-10-CM

## 2023-05-31 RX ORDER — BUPROPION HYDROCHLORIDE 150 MG/1
150 TABLET ORAL EVERY MORNING
Qty: 90 TABLET | Refills: 3 | Status: SHIPPED | OUTPATIENT
Start: 2023-05-31

## 2023-05-31 NOTE — TELEPHONE ENCOUNTER
----- Message from [id number] sent at 5/31/2023  8:39 AM EDT -----  Subject: Refill Request    QUESTIONS  Name of Medication? buPROPion (WELLBUTRIN XL) 150 MG extended release   tablet  Patient-reported dosage and instructions? 1 pill daily  How many days do you have left? 0  Preferred Pharmacy? [facility] [id number]  Pharmacy phone number (if available)? [phone]  Additional Information for Provider? The patient is needing refills on the   medication listed. No refills currently. Please refill asap. The patient   has an appt on 8/24/23 but needs meds until she can be seen by PCP.   ---------------------------------------------------------------------------  --------------,  Name of Medication? metFORMIN (GLUCOPHAGE) 500 MG tablet  Patient-reported dosage and instructions? 2 pills daily   How many days do you have left? 0  Preferred Pharmacy? [facility] [id number]  Pharmacy phone number (if available)? [phone]  Additional Information for Provider? The patient is needing refills on the   medication listed. No refills currently. Please refill asap. The patient   has an appt on 8/24/23 but needs meds until she can be seen by PCP.  ---------------------------------------------------------------------------  --------------  CALL BACK INFO  What is the best way for the office to contact you? OK to leave message on   voicemail  Preferred Call Back Phone Number? [phone]  ---------------------------------------------------------------------------  --------------  SCRIPT ANSWERS  Relationship to Patient?  Self

## 2023-05-31 NOTE — TELEPHONE ENCOUNTER
Please Approve or Refuse.   Send to Pharmacy per Pt's Request: [email or web address]     Next Visit Date:  8/24/2023   Last Visit Date: 1/6/2023    Hemoglobin A1C (%)   Date Value   08/13/2022 5.3   04/03/2021 5.4             ( goal A1C is < 7)   BP Readings from Last 3 Encounters:   01/06/23 132/84   11/22/22 102/82   10/12/21 114/84          (goal 120/80)  BUN   Date Value Ref Range Status   01/07/2023 10 6 - 20 mg/dL Final     Creatinine   Date Value Ref Range Status   01/07/2023 0.72 0.50 - 0.90 mg/dL Final     Potassium   Date Value Ref Range Status   01/07/2023 3.8 3.7 - 5.3 mmol/L Final

## 2023-06-01 DIAGNOSIS — E28.2 PCOS (POLYCYSTIC OVARIAN SYNDROME): ICD-10-CM

## 2023-06-01 DIAGNOSIS — F33.41 RECURRENT MAJOR DEPRESSIVE DISORDER, IN PARTIAL REMISSION (HCC): ICD-10-CM

## 2023-06-01 RX ORDER — BUPROPION HYDROCHLORIDE 150 MG/1
150 TABLET ORAL EVERY MORNING
Qty: 90 TABLET | Refills: 3 | OUTPATIENT
Start: 2023-06-01

## 2023-06-01 NOTE — TELEPHONE ENCOUNTER
6/1/2023    GYN pt  Last seen: 11/22/2022  Last annual: 11-22-22  Last refill: 6-1-23    Next appt: Visit date not found    Additional notes:  possible duplicate

## 2023-07-27 ENCOUNTER — OFFICE VISIT (OUTPATIENT)
Dept: FAMILY MEDICINE CLINIC | Age: 43
End: 2023-07-27
Payer: MEDICAID

## 2023-07-27 VITALS
DIASTOLIC BLOOD PRESSURE: 80 MMHG | WEIGHT: 292 LBS | HEART RATE: 65 BPM | BODY MASS INDEX: 45.73 KG/M2 | SYSTOLIC BLOOD PRESSURE: 114 MMHG | OXYGEN SATURATION: 98 %

## 2023-07-27 DIAGNOSIS — Z76.89 ENCOUNTER TO ESTABLISH CARE: Primary | ICD-10-CM

## 2023-07-27 DIAGNOSIS — E66.01 CLASS 3 SEVERE OBESITY WITHOUT SERIOUS COMORBIDITY WITH BODY MASS INDEX (BMI) OF 45.0 TO 49.9 IN ADULT, UNSPECIFIED OBESITY TYPE (HCC): ICD-10-CM

## 2023-07-27 PROCEDURE — 99214 OFFICE O/P EST MOD 30 MIN: CPT | Performed by: NURSE PRACTITIONER

## 2023-07-27 RX ORDER — PHENTERMINE HYDROCHLORIDE 37.5 MG/1
37.5 TABLET ORAL
Qty: 30 TABLET | Refills: 0 | Status: SHIPPED | OUTPATIENT
Start: 2023-07-27 | End: 2023-08-26

## 2023-07-27 SDOH — ECONOMIC STABILITY: FOOD INSECURITY: WITHIN THE PAST 12 MONTHS, THE FOOD YOU BOUGHT JUST DIDN'T LAST AND YOU DIDN'T HAVE MONEY TO GET MORE.: NEVER TRUE

## 2023-07-27 SDOH — ECONOMIC STABILITY: HOUSING INSECURITY
IN THE LAST 12 MONTHS, WAS THERE A TIME WHEN YOU DID NOT HAVE A STEADY PLACE TO SLEEP OR SLEPT IN A SHELTER (INCLUDING NOW)?: NO

## 2023-07-27 SDOH — ECONOMIC STABILITY: INCOME INSECURITY: HOW HARD IS IT FOR YOU TO PAY FOR THE VERY BASICS LIKE FOOD, HOUSING, MEDICAL CARE, AND HEATING?: NOT HARD AT ALL

## 2023-07-27 SDOH — ECONOMIC STABILITY: FOOD INSECURITY: WITHIN THE PAST 12 MONTHS, YOU WORRIED THAT YOUR FOOD WOULD RUN OUT BEFORE YOU GOT MONEY TO BUY MORE.: NEVER TRUE

## 2023-07-27 ASSESSMENT — ENCOUNTER SYMPTOMS
COLOR CHANGE: 0
CONSTIPATION: 0
DIARRHEA: 0
EYE PAIN: 0
VOMITING: 0
CHEST TIGHTNESS: 0
NAUSEA: 0
SINUS PRESSURE: 0
SHORTNESS OF BREATH: 0
BACK PAIN: 0
SINUS PAIN: 0
ABDOMINAL PAIN: 0

## 2023-07-27 NOTE — PROGRESS NOTES
Adrien Zuñiga (:  1980) is a 44 y.o. female,New patient, here for evaluation of the following chief complaint(s):  Establish Care         ASSESSMENT/PLAN:  1. Encounter to establish care  Comments:  Annual labs up to date - reviewed in care everywhere. 2. Class 3 severe obesity without serious comorbidity with body mass index (BMI) of 45.0 to 49.9 in adult, unspecified obesity type (HCC)  -     phentermine (ADIPEX-P) 37.5 MG tablet; Take 1 tablet by mouth every morning (before breakfast) for 30 days. Max Daily Amount: 37.5 mg, Disp-30 tablet, R-0Normal  Reviewed s/e profile of adiepx in detail  Discussed she will need to achieve 5% weight loss in 90 days maintain this in order to to continue Rx past 3 month period. Instructed to exercise regularly, start slow and work up to 150 minutes a week. Discussed food; smaller portions, avoiding eating after 7 pm, choosing lean foods low in carbs and fats and drink plenty of water throughout the day. PDMP reviewed    Return in about 4 weeks (around 2023) for Weight Check. Subjective   SUBJECTIVE/OBJECTIVE:  Presents as new patient to establish care  Previous PCP Dr. Marisela Adams - last seen 2023 for physical     OBGYN: Up to date on PAPs  Hx of PCOS, on OCP - this regulates periods     Struggles with obesity  Currently trying to intermittent fast - has been able to loose 10 lbs in 2 months   Given 1 month sample of rybelsus, did not see any results  Insurance does not cover any weekly injectibles  Interested in trying adipex      Review of Systems   Constitutional:  Negative for activity change, chills, fatigue and unexpected weight change. HENT:  Negative for hearing loss, postnasal drip, sinus pressure and sinus pain. Eyes:  Negative for pain and visual disturbance. Respiratory:  Negative for chest tightness and shortness of breath. Cardiovascular:  Negative for chest pain and palpitations.    Gastrointestinal:  Negative for

## 2023-08-24 ENCOUNTER — OFFICE VISIT (OUTPATIENT)
Dept: FAMILY MEDICINE CLINIC | Age: 43
End: 2023-08-24
Payer: COMMERCIAL

## 2023-08-24 VITALS
SYSTOLIC BLOOD PRESSURE: 116 MMHG | HEIGHT: 67 IN | HEART RATE: 85 BPM | RESPIRATION RATE: 20 BRPM | BODY MASS INDEX: 43.6 KG/M2 | OXYGEN SATURATION: 98 % | WEIGHT: 277.8 LBS | DIASTOLIC BLOOD PRESSURE: 80 MMHG

## 2023-08-24 DIAGNOSIS — E66.01 CLASS 3 SEVERE OBESITY WITHOUT SERIOUS COMORBIDITY WITH BODY MASS INDEX (BMI) OF 45.0 TO 49.9 IN ADULT, UNSPECIFIED OBESITY TYPE (HCC): ICD-10-CM

## 2023-08-24 PROCEDURE — 99213 OFFICE O/P EST LOW 20 MIN: CPT | Performed by: NURSE PRACTITIONER

## 2023-08-24 PROCEDURE — G8427 DOCREV CUR MEDS BY ELIG CLIN: HCPCS | Performed by: NURSE PRACTITIONER

## 2023-08-24 PROCEDURE — G8417 CALC BMI ABV UP PARAM F/U: HCPCS | Performed by: NURSE PRACTITIONER

## 2023-08-24 PROCEDURE — 1036F TOBACCO NON-USER: CPT | Performed by: NURSE PRACTITIONER

## 2023-08-24 RX ORDER — PHENTERMINE HYDROCHLORIDE 37.5 MG/1
37.5 TABLET ORAL
Qty: 30 TABLET | Refills: 0 | Status: SHIPPED | OUTPATIENT
Start: 2023-08-24 | End: 2023-09-23

## 2023-08-24 ASSESSMENT — ENCOUNTER SYMPTOMS
NAUSEA: 0
DIARRHEA: 0
ABDOMINAL PAIN: 0
SHORTNESS OF BREATH: 0
EYE PAIN: 0
CONSTIPATION: 0
VOMITING: 0
COLOR CHANGE: 0
CHEST TIGHTNESS: 0
SINUS PAIN: 0
BACK PAIN: 0
SINUS PRESSURE: 0

## 2023-08-24 NOTE — PROGRESS NOTES
Reema Parra (:  1980) is a 44 y.o. female,Established patient, here for evaluation of the following chief complaint(s):  Obesity (4 week weight check)         ASSESSMENT/PLAN:  1. Class 3 severe obesity without serious comorbidity with body mass index (BMI) of 45.0 to 49.9 in adult, unspecified obesity type (HCC)  -     phentermine (ADIPEX-P) 37.5 MG tablet; Take 1 tablet by mouth every morning (before breakfast) for 30 days. Max Daily Amount: 37.5 mg, Disp-30 tablet, R-0Normal    Down 15lbs   Rx for second month of Adipex   Continue healthy lifestyle changes    Return in about 4 weeks (around 2023) for Weight Check. Subjective   SUBJECTIVE/OBJECTIVE:  Here for one month adipex follow up. She has lost  15  lbs. No medication side effects. She is working on increasing exercise and healthy eating. Walking daily  Eating protein yogurt daily for breakfast   Frustrated that she did not loose more than 15lbs           Review of Systems   Constitutional:  Negative for activity change, chills, fatigue and unexpected weight change. HENT:  Negative for hearing loss, postnasal drip, sinus pressure and sinus pain. Eyes:  Negative for pain and visual disturbance. Respiratory:  Negative for chest tightness and shortness of breath. Cardiovascular:  Negative for chest pain and palpitations. Gastrointestinal:  Negative for abdominal pain, constipation, diarrhea, nausea and vomiting. Endocrine: Negative for polydipsia, polyphagia and polyuria. Genitourinary:  Negative for dysuria, flank pain, frequency and urgency. Musculoskeletal:  Negative for arthralgias, back pain and myalgias. Skin:  Negative for color change and rash. Neurological:  Negative for dizziness, weakness, light-headedness, numbness and headaches. Psychiatric/Behavioral:  Negative for confusion, hallucinations, self-injury, sleep disturbance and suicidal ideas. The patient is not nervous/anxious.

## 2023-09-25 ENCOUNTER — OFFICE VISIT (OUTPATIENT)
Dept: FAMILY MEDICINE CLINIC | Age: 43
End: 2023-09-25
Payer: COMMERCIAL

## 2023-09-25 VITALS
SYSTOLIC BLOOD PRESSURE: 116 MMHG | BODY MASS INDEX: 41.59 KG/M2 | RESPIRATION RATE: 20 BRPM | HEIGHT: 67 IN | DIASTOLIC BLOOD PRESSURE: 78 MMHG | HEART RATE: 94 BPM | WEIGHT: 265 LBS | OXYGEN SATURATION: 97 %

## 2023-09-25 DIAGNOSIS — E66.01 CLASS 3 SEVERE OBESITY WITHOUT SERIOUS COMORBIDITY WITH BODY MASS INDEX (BMI) OF 45.0 TO 49.9 IN ADULT, UNSPECIFIED OBESITY TYPE (HCC): ICD-10-CM

## 2023-09-25 PROCEDURE — 99213 OFFICE O/P EST LOW 20 MIN: CPT | Performed by: NURSE PRACTITIONER

## 2023-09-25 PROCEDURE — 1036F TOBACCO NON-USER: CPT | Performed by: NURSE PRACTITIONER

## 2023-09-25 PROCEDURE — G8417 CALC BMI ABV UP PARAM F/U: HCPCS | Performed by: NURSE PRACTITIONER

## 2023-09-25 PROCEDURE — G8427 DOCREV CUR MEDS BY ELIG CLIN: HCPCS | Performed by: NURSE PRACTITIONER

## 2023-09-25 RX ORDER — PHENTERMINE HYDROCHLORIDE 37.5 MG/1
37.5 TABLET ORAL
Qty: 30 TABLET | Refills: 0 | Status: SHIPPED | OUTPATIENT
Start: 2023-09-25 | End: 2023-10-25

## 2023-09-25 ASSESSMENT — ENCOUNTER SYMPTOMS
SINUS PRESSURE: 0
SHORTNESS OF BREATH: 0
BACK PAIN: 0
DIARRHEA: 0
NAUSEA: 0
EYE PAIN: 0
COLOR CHANGE: 0
SINUS PAIN: 0
VOMITING: 0
CHEST TIGHTNESS: 0
CONSTIPATION: 0
ABDOMINAL PAIN: 0

## 2023-09-25 NOTE — PROGRESS NOTES
for chest tightness and shortness of breath. Cardiovascular:  Negative for chest pain and palpitations. Gastrointestinal:  Negative for abdominal pain, constipation, diarrhea, nausea and vomiting. Endocrine: Negative for polydipsia, polyphagia and polyuria. Genitourinary:  Negative for dysuria, flank pain, frequency and urgency. Musculoskeletal:  Negative for arthralgias, back pain and myalgias. Skin:  Negative for color change and rash. Neurological:  Negative for dizziness, weakness, light-headedness, numbness and headaches. Psychiatric/Behavioral:  Negative for confusion, hallucinations, self-injury, sleep disturbance and suicidal ideas. The patient is not nervous/anxious. Objective   Physical Exam  Vitals and nursing note reviewed. Constitutional:       Appearance: Normal appearance. HENT:      Head: Normocephalic. Right Ear: Hearing normal.      Left Ear: Hearing normal.      Nose: Nose normal.      Mouth/Throat:      Mouth: Mucous membranes are moist.      Pharynx: Oropharynx is clear. Eyes:      Extraocular Movements: Extraocular movements intact. Conjunctiva/sclera: Conjunctivae normal.      Pupils: Pupils are equal, round, and reactive to light. Cardiovascular:      Rate and Rhythm: Normal rate and regular rhythm. Pulses: Normal pulses. Heart sounds: Normal heart sounds. Pulmonary:      Effort: Pulmonary effort is normal.      Breath sounds: Normal breath sounds. Abdominal:      General: Abdomen is flat. Bowel sounds are normal.      Palpations: Abdomen is soft. Musculoskeletal:         General: Normal range of motion. Cervical back: Normal range of motion. Skin:     General: Skin is warm and dry. Capillary Refill: Capillary refill takes less than 2 seconds. Neurological:      General: No focal deficit present. Mental Status: She is alert and oriented to person, place, and time. Mental status is at baseline.    Psychiatric:

## 2023-10-24 DIAGNOSIS — E66.01 CLASS 3 SEVERE OBESITY WITHOUT SERIOUS COMORBIDITY WITH BODY MASS INDEX (BMI) OF 45.0 TO 49.9 IN ADULT, UNSPECIFIED OBESITY TYPE (HCC): ICD-10-CM

## 2023-10-24 NOTE — TELEPHONE ENCOUNTER
Last visit: 09/25/23  Last Med refill: 09/25/23  Does patient have enough medication for 72 hours: Yes    Next Visit Date:  Future Appointments   Date Time Provider [address]   12/28/2023  8:30 AM TORIBIO Ron - NP [address] Maintenance   Topic Date Due    Flu vaccine (1) 08/24/2024 (Originally 8/1/2023)    COVID-19 Vaccine (1) 08/24/2024 (Originally 1980)    Hepatitis B vaccine (1 of 3 - 3-dose series) 09/25/2024 (Originally 1980)    Depression Monitoring  01/06/2024    Cervical cancer screen  10/12/2024    Lipids  08/13/2027    DTaP/Tdap/Td vaccine (2 - Td or Tdap) 11/07/2027    Hepatitis C screen  Completed    HIV screen  Completed    Hepatitis A vaccine  Aged Out    Hib vaccine  Aged Out    HPV vaccine  Aged Out    Meningococcal (ACWY) vaccine  Aged Out    Pneumococcal 0-64 years Vaccine  Aged Out    Varicella vaccine  Discontinued       Hemoglobin A1C (%)   Date Value   08/13/2022 5.3   04/03/2021 5.4             ( goal A1C is < 7)   No components found for: \"LABMICR\"  LDL Cholesterol (mg/dL)   Date Value   08/13/2022 92   04/03/2021 88       (goal LDL is <100)   AST (U/L)   Date Value   01/07/2023 21     ALT (U/L)   Date Value   01/07/2023 31     BUN (mg/dL)   Date Value   01/07/2023 10     BP Readings from Last 3 Encounters:   09/25/23 116/78   08/24/23 116/80   07/27/23 114/80          (goal 120/80)    All Future Testing planned in [facility]  Lab Frequency Next Occurrence               Patient Active Problem List:     PCOS (polycystic ovarian syndrome)     History of ectopic pregnancy     Near syncope     Morbid obesity with BMI of 45.0-49.9, adult (HCC)     Chronic fatigue     Recurrent major depressive disorder, in full remission ([address])     COVID-19 vaccination declined     TRACIE positive     Encounter for counseling regarding contraception     Vitamin D deficiency     Gastroesophageal reflux disease without esophagitis     Influenza vaccination declined

## 2023-10-25 RX ORDER — PHENTERMINE HYDROCHLORIDE 37.5 MG/1
37.5 TABLET ORAL
Qty: 30 TABLET | Refills: 0 | Status: SHIPPED | OUTPATIENT
Start: 2023-10-25 | End: 2023-11-24

## 2023-11-17 ENCOUNTER — OFFICE VISIT (OUTPATIENT)
Dept: FAMILY MEDICINE CLINIC | Age: 43
End: 2023-11-17

## 2023-11-17 ENCOUNTER — HOSPITAL ENCOUNTER (OUTPATIENT)
Age: 43
Setting detail: SPECIMEN
Discharge: HOME | End: 2023-11-17

## 2023-11-17 VITALS
DIASTOLIC BLOOD PRESSURE: 84 MMHG | SYSTOLIC BLOOD PRESSURE: 118 MMHG | WEIGHT: 255.8 LBS | HEART RATE: 94 BPM | OXYGEN SATURATION: 99 % | BODY MASS INDEX: 40.06 KG/M2

## 2023-11-17 DIAGNOSIS — R10.13 EPIGASTRIC PAIN: Primary | ICD-10-CM

## 2023-11-17 DIAGNOSIS — E66.01 CLASS 3 SEVERE OBESITY WITHOUT SERIOUS COMORBIDITY WITH BODY MASS INDEX (BMI) OF 40.0 TO 44.9 IN ADULT, UNSPECIFIED OBESITY TYPE (HCC): ICD-10-CM

## 2023-11-17 DIAGNOSIS — J02.0 ACUTE STREPTOCOCCAL PHARYNGITIS: ICD-10-CM

## 2023-11-17 DIAGNOSIS — R10.13 EPIGASTRIC PAIN: ICD-10-CM

## 2023-11-17 LAB
BACTERIA URNS QL MICRO: ABNORMAL
BILIRUB UR QL STRIP: NEGATIVE
BILIRUBIN, POC: NORMAL
BLOOD URINE, POC: NORMAL
CASTS #/AREA URNS LPF: ABNORMAL /LPF (ref 0–8)
CLARITY UR: CLEAR
CLARITY, POC: CLEAR
COLOR UR: YELLOW
COLOR, POC: YELLOW
EPI CELLS #/AREA URNS HPF: ABNORMAL /HPF (ref 0–5)
GLUCOSE UR STRIP-MCNC: NEGATIVE MG/DL
GLUCOSE URINE, POC: NORMAL
HGB UR QL STRIP.AUTO: NEGATIVE
KETONES UR STRIP-MCNC: NEGATIVE MG/DL
KETONES, POC: NORMAL
LEUKOCYTE EST, POC: NORMAL
LEUKOCYTE ESTERASE UR QL STRIP: ABNORMAL
NITRITE UR QL STRIP: NEGATIVE
NITRITE, POC: NORMAL
PH UR STRIP: 5.5 [PH] (ref 5–8)
PH, POC: 6.5
PROT UR STRIP-MCNC: NEGATIVE MG/DL
PROTEIN, POC: NORMAL
RBC #/AREA URNS HPF: ABNORMAL /HPF (ref 0–4)
S PYO AG THROAT QL: POSITIVE
SP GR UR STRIP: 1.01 (ref 1–1.03)
SPECIFIC GRAVITY, POC: 1
UROBILINOGEN UR STRIP-ACNC: NORMAL EU/DL (ref 0–1)
UROBILINOGEN, POC: NORMAL
WBC #/AREA URNS HPF: ABNORMAL /HPF (ref 0–5)

## 2023-11-17 RX ORDER — AMOXICILLIN 500 MG/1
500 CAPSULE ORAL 2 TIMES DAILY
Qty: 20 CAPSULE | Refills: 0 | Status: SHIPPED | OUTPATIENT
Start: 2023-11-17 | End: 2023-11-27

## 2023-11-17 ASSESSMENT — ENCOUNTER SYMPTOMS
NAUSEA: 0
CONSTIPATION: 0
SINUS PRESSURE: 0
SHORTNESS OF BREATH: 0
CHEST TIGHTNESS: 0
VOMITING: 0
EYE PAIN: 0
SINUS PAIN: 0
ABDOMINAL PAIN: 1
BACK PAIN: 0
DIARRHEA: 0
COLOR CHANGE: 0

## 2023-11-17 NOTE — PROGRESS NOTES
Jordi Garcia (:  1980) is a 44 y.o. female,Established patient, here for evaluation of the following chief complaint(s):  Abdominal Pain (Upper abdominal pain- off and on for awhile but patient states Tuesday night was the most severe its ever been. )         ASSESSMENT/PLAN:  1. Epigastric pain  -     Urinalysis with Microscopic; Future  -     Culture, Urine; Future  -     US ABDOMEN LIMITED; Future  -     POCT Urinalysis no Micro  2. Acute streptococcal pharyngitis  -     POCT rapid strep A  -     amoxicillin (AMOXIL) 500 MG capsule; Take 1 capsule by mouth 2 times daily for 10 days, Disp-20 capsule, R-0Normal  3. Class 3 severe obesity without serious comorbidity with body mass index (BMI) of 40.0 to 44.9 in adult, unspecified obesity type ([address])    Epigastric Pain:   POCT UA positive for leukocytes, will send out for culture. Follow up pending results. Rx for US abdomen - follow up pending results. Referral to GI if no symptom improvement. Strep throat:   Positive POCT rapid strep A  Patient instructed to complete entire antibiotic course. Tylenol/Motrin as needed for fever/discomfort. Salt water gargles and throat lozenges if desired. Change toothbrush in 24 hours. Patient agreeable to treatment plan. Educational materials provided on AVS.  Follow up if symptoms do not improve/worsen. 3.  Obesity: Down 10lbs since last OV. Continue Adpiex 37.5mg daily, portion control and exercise regimen. No side effects reported today. Follow up in 3 months. Initial BMI 45.73 2023, today BMI of 40.06.     PDMP Monitoring:    Last PDMP [place] as Reviewed:  Review User Review Instant Review Result   Toni Oh 2023  3:45 PM Reviewed PDMP [1]       Urine Drug Screenings (1 yr)       Drugs of Abuse, Urine  Collected: 3/28/2017  8:57 PM (Final result)                  Medication Contract and Consent for Opioid Use Documents Filed        No documents found                          Return in

## 2023-11-18 LAB
MICROORGANISM SPEC CULT: NORMAL
SPECIMEN DESCRIPTION: NORMAL

## 2023-11-23 DIAGNOSIS — E66.01 CLASS 3 SEVERE OBESITY WITHOUT SERIOUS COMORBIDITY WITH BODY MASS INDEX (BMI) OF 45.0 TO 49.9 IN ADULT, UNSPECIFIED OBESITY TYPE (HCC): ICD-10-CM

## 2023-11-24 RX ORDER — PHENTERMINE HYDROCHLORIDE 37.5 MG/1
37.5 TABLET ORAL
Qty: 30 TABLET | Refills: 0 | Status: SHIPPED | OUTPATIENT
Start: 2023-11-24 | End: 2023-12-24

## 2023-11-26 DIAGNOSIS — E28.2 PCOS (POLYCYSTIC OVARIAN SYNDROME): ICD-10-CM

## 2023-11-27 ENCOUNTER — HOSPITAL ENCOUNTER (OUTPATIENT)
Dept: ULTRASOUND IMAGING | Age: 43
Discharge: HOME | End: 2023-11-29
Payer: COMMERCIAL

## 2023-11-27 DIAGNOSIS — E28.2 PCOS (POLYCYSTIC OVARIAN SYNDROME): ICD-10-CM

## 2023-11-27 DIAGNOSIS — Z30.41 ORAL CONTRACEPTIVE PILL SURVEILLANCE: ICD-10-CM

## 2023-11-27 DIAGNOSIS — K80.20 GALL BLADDER STONES: Primary | ICD-10-CM

## 2023-11-27 DIAGNOSIS — R10.13 EPIGASTRIC PAIN: ICD-10-CM

## 2023-11-27 PROCEDURE — 76705 ECHO EXAM OF ABDOMEN: CPT

## 2023-11-27 RX ORDER — NORETHINDRONE ACETATE AND ETHINYL ESTRADIOL 1.5-30(21)
1 KIT ORAL DAILY
Qty: 1 PACKET | Refills: 1 | Status: SHIPPED | OUTPATIENT
Start: 2023-11-27 | End: 2024-11-26

## 2023-11-28 ENCOUNTER — TELEPHONE (OUTPATIENT)
Dept: FAMILY MEDICINE CLINIC | Age: 43
End: 2023-11-28

## 2023-11-28 NOTE — TELEPHONE ENCOUNTER
Patient called to schedule appointment for [facility] clinic for gallstones. Patient scheduled 11-29-23.

## 2023-11-29 ENCOUNTER — OFFICE VISIT (OUTPATIENT)
Dept: SURGERY | Age: 43
End: 2023-11-29
Payer: COMMERCIAL

## 2023-11-29 VITALS
BODY MASS INDEX: 39.71 KG/M2 | HEART RATE: 93 BPM | WEIGHT: 253 LBS | SYSTOLIC BLOOD PRESSURE: 121 MMHG | HEIGHT: 67 IN | DIASTOLIC BLOOD PRESSURE: 82 MMHG

## 2023-11-29 DIAGNOSIS — K80.20 CALCULUS OF GALLBLADDER WITHOUT CHOLECYSTITIS WITHOUT OBSTRUCTION: Primary | ICD-10-CM

## 2023-11-29 PROCEDURE — G8417 CALC BMI ABV UP PARAM F/U: HCPCS | Performed by: SURGERY

## 2023-11-29 PROCEDURE — 1036F TOBACCO NON-USER: CPT | Performed by: SURGERY

## 2023-11-29 PROCEDURE — G8427 DOCREV CUR MEDS BY ELIG CLIN: HCPCS | Performed by: SURGERY

## 2023-11-29 PROCEDURE — G8484 FLU IMMUNIZE NO ADMIN: HCPCS | Performed by: SURGERY

## 2023-11-29 PROCEDURE — 99204 OFFICE O/P NEW MOD 45 MIN: CPT | Performed by: SURGERY

## 2023-11-29 NOTE — PROGRESS NOTES
History and Physical  [facility]     Patient's Name/Date of Birth: Chalry Delcid / 1980 (85 y.o.)     Date: November 29, 2023      HPI: Pt is a 44 y.o. female who presents to [facility] with complaints of recurrent episodes of epigastric abdominal pain/pressure. Pt states pain has been intermittent for the past few months. Her most recent episode of pain was two days ago. She describes the pain as fullness in her epigastric region with radiation to the middle of her back. She reports the pain as uncomfortable and says it keeps her from sleeping at times, reports no association with food. She tried taking Pepto Bismol with no relieve. She reported intentional weight loss of 50 lbs since July 2023. She reports some nausea during episodes, but denies vomiting, diarrhea, constipation, fever, shortness of breath, chest pain, or urinary changes.       Denies taking anticoagulants or anticoagulants    Previous abdominal surgeries include open surgery for ectopic pregnancy     Past Medical History        Past Medical History:   Diagnosis Date    Anxiety      Back pain      Chlamydia      Depression 2013     on antidepressant loss of ectopic pregnancy    Dysthymia 4/22/2020    Herpes simplex without mention of complication      History of blood transfusion 2013     transfusion with ectopic pregnancy    Hypertension       No problem since 1/2015    Infertility, female      Obesity      PCOS (polycystic ovarian syndrome)      Seasonal allergies      Secondary hypertension      Trauma 2015    Uterine disorder 2006     septation noted, but not noted on any subsequent ultrasound            Past Surgical History         Past Surgical History:   Procedure Laterality Date    ECTOPIC PREGNANCY SURGERY   7/2013     Left fallopian tube removed    NASAL SEPTUM SURGERY   11/12/12     with turbinate reduction and polypectomy, ENT- dr. Jj Joyce                Current Facility-Administered

## 2023-11-29 NOTE — PROGRESS NOTES
Visit Information    Have you changed or started any medications since your last visit including any over-the-counter medicines, vitamins, or herbal medicines? no   Are you having any side effects from any of your medications? -  no  Have you stopped taking any of your medications? Is so, why? -  no    Have you seen any other physician or provider since your last visit? Yes - Records Requested  Have you had any other diagnostic tests since your last visit? Yes - Records Requested  Have you been seen in the emergency room and/or had an admission to a hospital since we last saw you? No  Have you had your routine dental cleaning in the past 6 months? no    Have you activated your Meetapp account? If not, what are your barriers?  Yes     Patient Care Team:  TORIBIO Mcginnis NP as PCP - General (Nurse Practitioner)  TORIBIO Mcginnis NP as PCP - Empaneled Provider  Christiano Moy MD as Obstetrician (Perinatology)  Stefano Garcia APRN - CNP (Nurse Practitioner)    Medical History Review  Past Medical, Family, and Social History reviewed and does not contribute to the patient presenting condition    Health Maintenance   Topic Date Due    Flu vaccine (1) 08/24/2024 (Originally 8/1/2023)    COVID-19 Vaccine (1) 08/24/2024 (Originally 1980)    Hepatitis B vaccine (1 of 3 - 3-dose series) 09/25/2024 (Originally 1980)    Depression Monitoring  01/06/2024    Cervical cancer screen  10/12/2024    Lipids  08/13/2027    DTaP/Tdap/Td vaccine (2 - Td or Tdap) 11/07/2027    Hepatitis C screen  Completed    HIV screen  Completed    Hepatitis A vaccine  Aged Out    Hib vaccine  Aged Out    HPV vaccine  Aged Out    Meningococcal (ACWY) vaccine  Aged Out    Pneumococcal 0-64 years Vaccine  Aged Out    Varicella vaccine  Discontinued

## 2023-11-29 NOTE — PATIENT INSTRUCTIONS
Thank you letting us take care of you today. We hope that all your questions were addressed. If a question was overlooked or something else comes to mind after you return home, please call our office at [phone]. If you need to cancel or change an appointment, surgery or procedure, please contact the office at [phone].       Dr. Ricardo Grayson: [phone]

## 2023-12-25 ENCOUNTER — PATIENT MESSAGE (OUTPATIENT)
Dept: FAMILY MEDICINE CLINIC | Age: 43
End: 2023-12-25

## 2023-12-25 DIAGNOSIS — E66.01 CLASS 3 SEVERE OBESITY WITHOUT SERIOUS COMORBIDITY WITH BODY MASS INDEX (BMI) OF 45.0 TO 49.9 IN ADULT, UNSPECIFIED OBESITY TYPE (HCC): ICD-10-CM

## 2023-12-26 DIAGNOSIS — E66.01 CLASS 3 SEVERE OBESITY WITHOUT SERIOUS COMORBIDITY WITH BODY MASS INDEX (BMI) OF 45.0 TO 49.9 IN ADULT, UNSPECIFIED OBESITY TYPE (HCC): ICD-10-CM

## 2023-12-26 RX ORDER — PHENTERMINE HYDROCHLORIDE 37.5 MG/1
TABLET ORAL
Qty: 30 TABLET | OUTPATIENT
Start: 2023-12-26

## 2023-12-26 NOTE — TELEPHONE ENCOUNTER
Kelsie Mata is calling to request a refill on the following medication(s):    Medication Request:  Requested Prescriptions     Pending Prescriptions Disp Refills    phentermine (ADIPEX-P) 37.5 MG tablet 30 tablet 0     Sig: Take 1 tablet by mouth every morning (before breakfast) for 30 days.  Max Daily Amount: 37.5 mg       Last Visit Date (If Applicable):  30/38/3394    Next Visit Date:    2/22/2024

## 2023-12-26 NOTE — TELEPHONE ENCOUNTER
Jensen Merida is calling to request a refill on the following medication(s):    Medication Request:  Requested Prescriptions     Pending Prescriptions Disp Refills    phentermine (ADIPEX-P) 37.5 MG tablet [Pharmacy Med Name: PHENTERMINE 37.5 MG TABLET] 30 tablet      Sig: TAKE ONE TABLET BY MOUTH EVERY MORNING (BEFORE BREAKFAST) FOR 30 DAYS       Last Visit Date (If Applicable):  66/77/7874    Next Visit Date:    2/22/2024

## 2023-12-26 NOTE — TELEPHONE ENCOUNTER
Last visit: 11/17/23  Last Med refill: 11/24/23  Does patient have enough medication for 72 hours: No: completely out    Next Visit Date:  Future Appointments   Date Time Provider [address]   12/28/2023 10:20 AM Josselin Garcia, APRN - CNP [id number]   2/22/2024  8:30 AM Angela Esparza, APRN - NP [address] Maintenance   Topic Date Due    Depression Monitoring  01/06/2024    Flu vaccine (1) 08/24/2024 (Originally 8/1/2023)    COVID-19 Vaccine (1) 08/24/2024 (Originally 1980)    Hepatitis B vaccine (1 of 3 - 3-dose series) 09/25/2024 (Originally 1980)    Cervical cancer screen  10/12/2024    Lipids  08/13/2027    DTaP/Tdap/Td vaccine (2 - Td or Tdap) 11/07/2027    Hepatitis C screen  Completed    HIV screen  Completed    Hepatitis A vaccine  Aged Out    Hib vaccine  Aged Out    HPV vaccine  Aged Out    Polio vaccine  Aged Out    Meningococcal (ACWY) vaccine  Aged Out    Pneumococcal 0-64 years Vaccine  Aged Out    Varicella vaccine  Discontinued       Hemoglobin A1C (%)   Date Value   08/13/2022 5.3   04/03/2021 5.4             ( goal A1C is < 7)   No components found for: \"LABMICR\"  LDL Cholesterol (mg/dL)   Date Value   08/13/2022 92   04/03/2021 88       (goal LDL is <100)   AST (U/L)   Date Value   01/07/2023 21     ALT (U/L)   Date Value   01/07/2023 31     BUN (mg/dL)   Date Value   01/07/2023 10     BP Readings from Last 3 Encounters:   11/29/23 121/82   11/17/23 118/84   09/25/23 116/78          (goal 120/80)    All Future Testing planned in [facility]  Lab Frequency Next Occurrence               Patient Active Problem List:     PCOS (polycystic ovarian syndrome)     History of ectopic pregnancy     Near syncope     Morbid obesity with BMI of 45.0-49.9, adult (HCC)     Chronic fatigue     Recurrent major depressive disorder, in full remission ([address])     COVID-19 vaccination declined     TRACIE positive     Encounter for counseling regarding contraception

## 2023-12-27 RX ORDER — PHENTERMINE HYDROCHLORIDE 37.5 MG/1
37.5 TABLET ORAL
Qty: 30 TABLET | Refills: 0 | OUTPATIENT
Start: 2023-12-27 | End: 2024-01-26

## 2023-12-27 RX ORDER — PHENTERMINE HYDROCHLORIDE 37.5 MG/1
37.5 TABLET ORAL
Qty: 30 TABLET | Refills: 0 | Status: SHIPPED | OUTPATIENT
Start: 2023-12-27 | End: 2024-01-26

## 2023-12-28 ENCOUNTER — OFFICE VISIT (OUTPATIENT)
Dept: OBGYN CLINIC | Age: 43
End: 2023-12-28
Payer: COMMERCIAL

## 2023-12-28 VITALS
WEIGHT: 254.8 LBS | HEIGHT: 67 IN | SYSTOLIC BLOOD PRESSURE: 130 MMHG | BODY MASS INDEX: 39.99 KG/M2 | DIASTOLIC BLOOD PRESSURE: 85 MMHG

## 2023-12-28 DIAGNOSIS — E28.2 PCOS (POLYCYSTIC OVARIAN SYNDROME): ICD-10-CM

## 2023-12-28 DIAGNOSIS — Z30.41 ORAL CONTRACEPTIVE PILL SURVEILLANCE: ICD-10-CM

## 2023-12-28 DIAGNOSIS — Z01.419 ENCOUNTER FOR GYNECOLOGICAL EXAMINATION: Primary | ICD-10-CM

## 2023-12-28 DIAGNOSIS — Z12.31 ENCOUNTER FOR SCREENING MAMMOGRAM FOR BREAST CANCER: ICD-10-CM

## 2023-12-28 PROCEDURE — 99396 PREV VISIT EST AGE 40-64: CPT | Performed by: NURSE PRACTITIONER

## 2023-12-28 PROCEDURE — G8484 FLU IMMUNIZE NO ADMIN: HCPCS | Performed by: NURSE PRACTITIONER

## 2023-12-28 RX ORDER — NORETHINDRONE ACETATE AND ETHINYL ESTRADIOL 1.5-30(21)
1 KIT ORAL DAILY
Qty: 3 PACKET | Refills: 3 | Status: SHIPPED | OUTPATIENT
Start: 2023-12-28 | End: 2024-12-27

## 2023-12-28 NOTE — PROGRESS NOTES
Chaperone for Intimate Exam  Chaperone was offered as part of the rooming process. Patient declined and agrees to continue with exam without a chaperone.   Chaperone: NONE
and Vitamin D dosing reviewed. - Colonoscopy screening reviewed. - General diet and exercise reviewed. - Routine health maintenance per patients PCP.     Return in about 1 year (around 12/28/2024) for annual exam.        Electronically signed by TORIBIO Bates CNP on 12/28/23 at 8:17 AM EST

## 2024-01-12 NOTE — PROGRESS NOTES
DAY OF SURGERY/PROCEDURE  GUIDELINES    As a patient at the [facility], you can expect quality medical and nursing care that is centered on your individual needs. It is our goal to make your surgical experience as comfortable and excellent as possible.  ________________________________________________________________________    The following instructions are general guidelines, if any information on this sheet is different from what your doctor has instructed you to do, please follow your doctor's instructions.    Please arrive on 01/19/2024 @ 0700      Enter through entrance C. Check in at registration     Upon arrival you will be taken to the pre-operative area to get ready for surgery, your family will stay in the waiting room and visit with you once you are ready for surgery. Due to special limitations please limit visitation to 1-2 members of your family at a time. When it is time for surgery your family will return to the waiting room.    Nothing to eat, drink, smoke, suck or chew after midnight (no water, gum, mints, cigarettes, cigars, pipes, snuff, chewing tobacco, etc.) or your surgery may be canceled.     Take a shower or bath on the morning of your surgery/procedure (Hibiclens if directed) Do not apply any lotions.    Brush your teeth, but do not swallow any water    IN CASE OF ILLNESS - If you have a cold or flu symptoms (high fever, runny nose, sore throat, cough, etc.) rash, nausea, vomiting, loose stools, and/or recent contact with someone who has a contagious disease (chick pox, measles, etc.) please call your doctor before coming to the surgery center    Take a small sip of water with heart, blood pressure, and/or seizure medication the morning of surgery.       If applicable bring your:  Inhaler (s)  Hearing aid(s)  Eyeglasses and Case (If you wear contacts they have to be removed before surgery, bring case and solution)  CPAP     DO NOT take anticoagulants (blood thinners,

## 2024-01-17 ENCOUNTER — ANESTHESIA EVENT (OUTPATIENT)
Dept: OPERATING ROOM | Age: 44
End: 2024-01-17
Payer: COMMERCIAL

## 2024-01-19 ENCOUNTER — HOSPITAL ENCOUNTER (OUTPATIENT)
Age: 44
Setting detail: OUTPATIENT SURGERY
Discharge: HOME | End: 2024-01-19
Attending: SURGERY | Admitting: SURGERY
Payer: COMMERCIAL

## 2024-01-19 ENCOUNTER — ANESTHESIA (OUTPATIENT)
Dept: OPERATING ROOM | Age: 44
End: 2024-01-19
Payer: COMMERCIAL

## 2024-01-19 VITALS
WEIGHT: 252.6 LBS | RESPIRATION RATE: 19 BRPM | HEIGHT: 67 IN | HEART RATE: 79 BPM | OXYGEN SATURATION: 98 % | TEMPERATURE: 97.8 F | DIASTOLIC BLOOD PRESSURE: 73 MMHG | SYSTOLIC BLOOD PRESSURE: 126 MMHG | BODY MASS INDEX: 39.65 KG/M2

## 2024-01-19 DIAGNOSIS — G89.18 POST-OP PAIN: Primary | ICD-10-CM

## 2024-01-19 DIAGNOSIS — K81.9 CHOLECYSTITIS: ICD-10-CM

## 2024-01-19 LAB — HCG, PREGNANCY URINE (POC): NEGATIVE

## 2024-01-19 PROCEDURE — 7100000000 HC PACU RECOVERY - FIRST 15 MIN: Performed by: SURGERY

## 2024-01-19 PROCEDURE — 3600000019 HC SURGERY ROBOT ADDTL 15MIN: Performed by: SURGERY

## 2024-01-19 PROCEDURE — 6360000002 HC RX W HCPCS

## 2024-01-19 PROCEDURE — 2500000003 HC RX 250 WO HCPCS: Performed by: NURSE ANESTHETIST, CERTIFIED REGISTERED

## 2024-01-19 PROCEDURE — S2900 ROBOTIC SURGICAL SYSTEM: HCPCS | Performed by: SURGERY

## 2024-01-19 PROCEDURE — 3600000009 HC SURGERY ROBOT BASE: Performed by: SURGERY

## 2024-01-19 PROCEDURE — 88304 TISSUE EXAM BY PATHOLOGIST: CPT

## 2024-01-19 PROCEDURE — 3700000000 HC ANESTHESIA ATTENDED CARE: Performed by: SURGERY

## 2024-01-19 PROCEDURE — 64488 TAP BLOCK BI INJECTION: CPT | Performed by: ANESTHESIOLOGY

## 2024-01-19 PROCEDURE — C1889 IMPLANT/INSERT DEVICE, NOC: HCPCS | Performed by: SURGERY

## 2024-01-19 PROCEDURE — 7100000001 HC PACU RECOVERY - ADDTL 15 MIN: Performed by: SURGERY

## 2024-01-19 PROCEDURE — 6370000000 HC RX 637 (ALT 250 FOR IP): Performed by: ANESTHESIOLOGY

## 2024-01-19 PROCEDURE — 81025 URINE PREGNANCY TEST: CPT

## 2024-01-19 PROCEDURE — 2580000003 HC RX 258: Performed by: ANESTHESIOLOGY

## 2024-01-19 PROCEDURE — 2580000003 HC RX 258: Performed by: SURGERY

## 2024-01-19 PROCEDURE — 6360000002 HC RX W HCPCS: Performed by: ANESTHESIOLOGY

## 2024-01-19 PROCEDURE — 2709999900 HC NON-CHARGEABLE SUPPLY: Performed by: SURGERY

## 2024-01-19 PROCEDURE — 6360000002 HC RX W HCPCS: Performed by: NURSE ANESTHETIST, CERTIFIED REGISTERED

## 2024-01-19 PROCEDURE — 3700000001 HC ADD 15 MINUTES (ANESTHESIA): Performed by: SURGERY

## 2024-01-19 PROCEDURE — 2500000003 HC RX 250 WO HCPCS: Performed by: STUDENT IN AN ORGANIZED HEALTH CARE EDUCATION/TRAINING PROGRAM

## 2024-01-19 PROCEDURE — 2500000003 HC RX 250 WO HCPCS

## 2024-01-19 PROCEDURE — 6360000002 HC RX W HCPCS: Performed by: SURGERY

## 2024-01-19 PROCEDURE — 7100000010 HC PHASE II RECOVERY - FIRST 15 MIN: Performed by: SURGERY

## 2024-01-19 PROCEDURE — 7100000011 HC PHASE II RECOVERY - ADDTL 15 MIN: Performed by: SURGERY

## 2024-01-19 DEVICE — CLIP INT L POLYMER LOK LIG HEM O LOK (6EA/PK): Type: IMPLANTABLE DEVICE | Site: ABDOMEN | Status: FUNCTIONAL

## 2024-01-19 RX ORDER — LIDOCAINE HYDROCHLORIDE 10 MG/ML
1 INJECTION, SOLUTION EPIDURAL; INFILTRATION; INTRACAUDAL; PERINEURAL
Status: DISCONTINUED | OUTPATIENT
Start: 2024-01-19 | End: 2024-01-19 | Stop reason: HOSPADM

## 2024-01-19 RX ORDER — FAMOTIDINE 10 MG/ML
INJECTION, SOLUTION INTRAVENOUS
Status: COMPLETED
Start: 2024-01-19 | End: 2024-01-19

## 2024-01-19 RX ORDER — FENTANYL CITRATE 50 UG/ML
100 INJECTION, SOLUTION INTRAMUSCULAR; INTRAVENOUS ONCE
Status: CANCELLED | OUTPATIENT
Start: 2024-01-19 | End: 2024-01-19

## 2024-01-19 RX ORDER — FENTANYL CITRATE 50 UG/ML
INJECTION, SOLUTION INTRAMUSCULAR; INTRAVENOUS
Status: COMPLETED
Start: 2024-01-19 | End: 2024-01-19

## 2024-01-19 RX ORDER — ONDANSETRON 4 MG/1
4 TABLET, FILM COATED ORAL EVERY 8 HOURS PRN
Qty: 30 TABLET | Refills: 0 | Status: SHIPPED | OUTPATIENT
Start: 2024-01-19 | End: 2024-01-29

## 2024-01-19 RX ORDER — DIPHENHYDRAMINE HYDROCHLORIDE 50 MG/ML
12.5 INJECTION INTRAMUSCULAR; INTRAVENOUS
Status: DISCONTINUED | OUTPATIENT
Start: 2024-01-19 | End: 2024-01-19 | Stop reason: HOSPADM

## 2024-01-19 RX ORDER — LIDOCAINE HYDROCHLORIDE 10 MG/ML
INJECTION, SOLUTION INFILTRATION; PERINEURAL PRN
Status: DISCONTINUED | OUTPATIENT
Start: 2024-01-19 | End: 2024-01-19 | Stop reason: SDUPTHER

## 2024-01-19 RX ORDER — FENTANYL CITRATE 50 UG/ML
INJECTION, SOLUTION INTRAMUSCULAR; INTRAVENOUS PRN
Status: DISCONTINUED | OUTPATIENT
Start: 2024-01-19 | End: 2024-01-19 | Stop reason: SDUPTHER

## 2024-01-19 RX ORDER — SCOLOPAMINE TRANSDERMAL SYSTEM 1 MG/1
1 PATCH, EXTENDED RELEASE TRANSDERMAL ONCE
Status: DISCONTINUED | OUTPATIENT
Start: 2024-01-19 | End: 2024-01-19 | Stop reason: HOSPADM

## 2024-01-19 RX ORDER — ONDANSETRON 2 MG/ML
4 INJECTION INTRAMUSCULAR; INTRAVENOUS
Status: COMPLETED | OUTPATIENT
Start: 2024-01-19 | End: 2024-01-19

## 2024-01-19 RX ORDER — SCOLOPAMINE TRANSDERMAL SYSTEM 1 MG/1
PATCH, EXTENDED RELEASE TRANSDERMAL
Status: DISCONTINUED
Start: 2024-01-19 | End: 2024-01-19 | Stop reason: HOSPADM

## 2024-01-19 RX ORDER — DOCUSATE SODIUM 100 MG/1
100 CAPSULE, LIQUID FILLED ORAL 2 TIMES DAILY
Qty: 60 CAPSULE | Refills: 0 | Status: SHIPPED | OUTPATIENT
Start: 2024-01-19 | End: 2024-02-18

## 2024-01-19 RX ORDER — SODIUM CHLORIDE 9 MG/ML
INJECTION, SOLUTION INTRAVENOUS PRN
Status: DISCONTINUED | OUTPATIENT
Start: 2024-01-19 | End: 2024-01-19 | Stop reason: HOSPADM

## 2024-01-19 RX ORDER — MIDAZOLAM HYDROCHLORIDE 1 MG/ML
INJECTION INTRAMUSCULAR; INTRAVENOUS
Status: COMPLETED
Start: 2024-01-19 | End: 2024-01-19

## 2024-01-19 RX ORDER — CYCLOBENZAPRINE HCL 5 MG
5 TABLET ORAL 3 TIMES DAILY PRN
Qty: 10 TABLET | Refills: 0 | Status: SHIPPED | OUTPATIENT
Start: 2024-01-19 | End: 2024-01-29

## 2024-01-19 RX ORDER — LABETALOL HYDROCHLORIDE 5 MG/ML
10 INJECTION, SOLUTION INTRAVENOUS
Status: DISCONTINUED | OUTPATIENT
Start: 2024-01-19 | End: 2024-01-19 | Stop reason: HOSPADM

## 2024-01-19 RX ORDER — MIDAZOLAM HYDROCHLORIDE 2 MG/2ML
2 INJECTION, SOLUTION INTRAMUSCULAR; INTRAVENOUS
Status: DISCONTINUED | OUTPATIENT
Start: 2024-01-19 | End: 2024-01-19 | Stop reason: HOSPADM

## 2024-01-19 RX ORDER — MEPERIDINE HYDROCHLORIDE 50 MG/ML
12.5 INJECTION INTRAMUSCULAR; INTRAVENOUS; SUBCUTANEOUS EVERY 5 MIN PRN
Status: DISCONTINUED | OUTPATIENT
Start: 2024-01-19 | End: 2024-01-19 | Stop reason: HOSPADM

## 2024-01-19 RX ORDER — MORPHINE SULFATE 2 MG/ML
2 INJECTION, SOLUTION INTRAMUSCULAR; INTRAVENOUS EVERY 5 MIN PRN
Status: DISCONTINUED | OUTPATIENT
Start: 2024-01-19 | End: 2024-01-19 | Stop reason: HOSPADM

## 2024-01-19 RX ORDER — ROCURONIUM BROMIDE 10 MG/ML
INJECTION, SOLUTION INTRAVENOUS PRN
Status: DISCONTINUED | OUTPATIENT
Start: 2024-01-19 | End: 2024-01-19 | Stop reason: SDUPTHER

## 2024-01-19 RX ORDER — METOCLOPRAMIDE HYDROCHLORIDE 5 MG/ML
10 INJECTION INTRAMUSCULAR; INTRAVENOUS
Status: COMPLETED | OUTPATIENT
Start: 2024-01-19 | End: 2024-01-19

## 2024-01-19 RX ORDER — ONDANSETRON 2 MG/ML
INJECTION INTRAMUSCULAR; INTRAVENOUS PRN
Status: DISCONTINUED | OUTPATIENT
Start: 2024-01-19 | End: 2024-01-19 | Stop reason: SDUPTHER

## 2024-01-19 RX ORDER — DEXAMETHASONE SODIUM PHOSPHATE 10 MG/ML
INJECTION, SOLUTION INTRAMUSCULAR; INTRAVENOUS PRN
Status: DISCONTINUED | OUTPATIENT
Start: 2024-01-19 | End: 2024-01-19 | Stop reason: SDUPTHER

## 2024-01-19 RX ORDER — OXYCODONE HYDROCHLORIDE AND ACETAMINOPHEN 5; 325 MG/1; MG/1
1 TABLET ORAL EVERY 6 HOURS PRN
Qty: 20 TABLET | Refills: 0 | Status: SHIPPED | OUTPATIENT
Start: 2024-01-19 | End: 2024-01-24

## 2024-01-19 RX ORDER — PROPOFOL 10 MG/ML
INJECTION, EMULSION INTRAVENOUS PRN
Status: DISCONTINUED | OUTPATIENT
Start: 2024-01-19 | End: 2024-01-19 | Stop reason: SDUPTHER

## 2024-01-19 RX ORDER — APREPITANT 40 MG/1
40 CAPSULE ORAL ONCE
Status: DISCONTINUED | OUTPATIENT
Start: 2024-01-19 | End: 2024-01-19 | Stop reason: HOSPADM

## 2024-01-19 RX ORDER — SODIUM CHLORIDE 0.9 % (FLUSH) 0.9 %
5-40 SYRINGE (ML) INJECTION EVERY 12 HOURS SCHEDULED
Status: DISCONTINUED | OUTPATIENT
Start: 2024-01-19 | End: 2024-01-19 | Stop reason: HOSPADM

## 2024-01-19 RX ORDER — BUPIVACAINE HYDROCHLORIDE 2.5 MG/ML
INJECTION, SOLUTION INFILTRATION; PERINEURAL
Status: COMPLETED
Start: 2024-01-19 | End: 2024-01-19

## 2024-01-19 RX ORDER — SODIUM CHLORIDE 0.9 % (FLUSH) 0.9 %
5-40 SYRINGE (ML) INJECTION PRN
Status: DISCONTINUED | OUTPATIENT
Start: 2024-01-19 | End: 2024-01-19 | Stop reason: HOSPADM

## 2024-01-19 RX ORDER — DIPHENHYDRAMINE HYDROCHLORIDE 50 MG/ML
INJECTION INTRAMUSCULAR; INTRAVENOUS PRN
Status: DISCONTINUED | OUTPATIENT
Start: 2024-01-19 | End: 2024-01-19 | Stop reason: SDUPTHER

## 2024-01-19 RX ORDER — IPRATROPIUM BROMIDE AND ALBUTEROL SULFATE 2.5; .5 MG/3ML; MG/3ML
1 SOLUTION RESPIRATORY (INHALATION)
Status: DISCONTINUED | OUTPATIENT
Start: 2024-01-19 | End: 2024-01-19 | Stop reason: HOSPADM

## 2024-01-19 RX ORDER — MIDAZOLAM HYDROCHLORIDE 2 MG/2ML
2 INJECTION, SOLUTION INTRAMUSCULAR; INTRAVENOUS ONCE
Status: CANCELLED | OUTPATIENT
Start: 2024-01-19 | End: 2024-01-19

## 2024-01-19 RX ORDER — APREPITANT 40 MG/1
CAPSULE ORAL
Status: COMPLETED
Start: 2024-01-19 | End: 2024-01-19

## 2024-01-19 RX ORDER — SODIUM CHLORIDE, SODIUM LACTATE, POTASSIUM CHLORIDE, CALCIUM CHLORIDE 600; 310; 30; 20 MG/100ML; MG/100ML; MG/100ML; MG/100ML
INJECTION, SOLUTION INTRAVENOUS CONTINUOUS
Status: DISCONTINUED | OUTPATIENT
Start: 2024-01-19 | End: 2024-01-19 | Stop reason: HOSPADM

## 2024-01-19 RX ORDER — HYDRALAZINE HYDROCHLORIDE 20 MG/ML
10 INJECTION INTRAMUSCULAR; INTRAVENOUS
Status: DISCONTINUED | OUTPATIENT
Start: 2024-01-19 | End: 2024-01-19 | Stop reason: HOSPADM

## 2024-01-19 RX ORDER — INDOCYANINE GREEN AND WATER 25 MG
KIT INJECTION
Status: DISCONTINUED
Start: 2024-01-19 | End: 2024-01-19 | Stop reason: HOSPADM

## 2024-01-19 RX ORDER — ONDANSETRON 2 MG/ML
INJECTION INTRAMUSCULAR; INTRAVENOUS
Status: COMPLETED
Start: 2024-01-19 | End: 2024-01-19

## 2024-01-19 RX ORDER — KETOROLAC TROMETHAMINE 30 MG/ML
INJECTION, SOLUTION INTRAMUSCULAR; INTRAVENOUS PRN
Status: DISCONTINUED | OUTPATIENT
Start: 2024-01-19 | End: 2024-01-19 | Stop reason: SDUPTHER

## 2024-01-19 RX ORDER — BUPIVACAINE HYDROCHLORIDE 2.5 MG/ML
INJECTION, SOLUTION EPIDURAL; INFILTRATION; INTRACAUDAL
Status: COMPLETED | OUTPATIENT
Start: 2024-01-19 | End: 2024-01-19

## 2024-01-19 RX ORDER — METOCLOPRAMIDE HYDROCHLORIDE 5 MG/ML
INJECTION INTRAMUSCULAR; INTRAVENOUS
Status: COMPLETED
Start: 2024-01-19 | End: 2024-01-19

## 2024-01-19 RX ORDER — DEXAMETHASONE SODIUM PHOSPHATE 10 MG/ML
INJECTION INTRAMUSCULAR; INTRAVENOUS
Status: DISCONTINUED
Start: 2024-01-19 | End: 2024-01-19 | Stop reason: HOSPADM

## 2024-01-19 RX ORDER — CEFAZOLIN 2 G/1
INJECTION, POWDER, FOR SOLUTION INTRAMUSCULAR; INTRAVENOUS
Status: DISCONTINUED
Start: 2024-01-19 | End: 2024-01-19 | Stop reason: HOSPADM

## 2024-01-19 RX ORDER — INDOCYANINE GREEN AND WATER 25 MG
5 KIT INJECTION ONCE
Status: COMPLETED | OUTPATIENT
Start: 2024-01-19 | End: 2024-01-19

## 2024-01-19 RX ADMIN — FAMOTIDINE 20 MG: 10 INJECTION, SOLUTION INTRAVENOUS at 08:32

## 2024-01-19 RX ADMIN — LIDOCAINE HYDROCHLORIDE 40 MG: 10 INJECTION, SOLUTION INFILTRATION; PERINEURAL at 09:23

## 2024-01-19 RX ADMIN — FENTANYL CITRATE 50 MCG: 50 INJECTION, SOLUTION INTRAMUSCULAR; INTRAVENOUS at 09:23

## 2024-01-19 RX ADMIN — APREPITANT 40 MG: 40 CAPSULE ORAL at 08:30

## 2024-01-19 RX ADMIN — SODIUM CHLORIDE, POTASSIUM CHLORIDE, SODIUM LACTATE AND CALCIUM CHLORIDE: 600; 310; 30; 20 INJECTION, SOLUTION INTRAVENOUS at 08:23

## 2024-01-19 RX ADMIN — FENTANYL CITRATE 50 MCG: 50 INJECTION, SOLUTION INTRAMUSCULAR; INTRAVENOUS at 09:52

## 2024-01-19 RX ADMIN — ONDANSETRON 4 MG: 2 INJECTION INTRAMUSCULAR; INTRAVENOUS at 10:53

## 2024-01-19 RX ADMIN — INDOCYANINE GREEN AND WATER 5 MG: KIT at 08:54

## 2024-01-19 RX ADMIN — DIPHENHYDRAMINE HYDROCHLORIDE 12.5 MG: 50 INJECTION INTRAMUSCULAR; INTRAVENOUS at 09:31

## 2024-01-19 RX ADMIN — PROPOFOL 200 MG: 10 INJECTION, EMULSION INTRAVENOUS at 09:24

## 2024-01-19 RX ADMIN — FENTANYL CITRATE 25 MCG: 50 INJECTION, SOLUTION INTRAMUSCULAR; INTRAVENOUS at 11:22

## 2024-01-19 RX ADMIN — FENTANYL CITRATE 100 MCG: 50 INJECTION, SOLUTION INTRAMUSCULAR; INTRAVENOUS at 09:02

## 2024-01-19 RX ADMIN — KETOROLAC TROMETHAMINE 30 MG: 30 INJECTION, SOLUTION INTRAMUSCULAR; INTRAVENOUS at 11:10

## 2024-01-19 RX ADMIN — METOCLOPRAMIDE 10 MG: 5 INJECTION, SOLUTION INTRAMUSCULAR; INTRAVENOUS at 12:19

## 2024-01-19 RX ADMIN — SUGAMMADEX 200 MG: 100 INJECTION, SOLUTION INTRAVENOUS at 11:20

## 2024-01-19 RX ADMIN — MIDAZOLAM HYDROCHLORIDE 2 MG: 1 INJECTION, SOLUTION INTRAMUSCULAR; INTRAVENOUS at 09:02

## 2024-01-19 RX ADMIN — ROCURONIUM BROMIDE 10 MG: 10 INJECTION, SOLUTION INTRAVENOUS at 09:52

## 2024-01-19 RX ADMIN — BUPIVACAINE HYDROCHLORIDE 80 ML: 2.5 INJECTION, SOLUTION EPIDURAL; INFILTRATION; INTRACAUDAL; PERINEURAL at 09:13

## 2024-01-19 RX ADMIN — ONDANSETRON 4 MG: 2 INJECTION INTRAMUSCULAR; INTRAVENOUS at 12:04

## 2024-01-19 RX ADMIN — FENTANYL CITRATE 50 MCG: 50 INJECTION, SOLUTION INTRAMUSCULAR; INTRAVENOUS at 11:02

## 2024-01-19 RX ADMIN — SODIUM CHLORIDE, POTASSIUM CHLORIDE, SODIUM LACTATE AND CALCIUM CHLORIDE: 600; 310; 30; 20 INJECTION, SOLUTION INTRAVENOUS at 10:11

## 2024-01-19 RX ADMIN — DEXAMETHASONE SODIUM PHOSPHATE 10 MG: 10 INJECTION INTRAMUSCULAR; INTRAVENOUS at 09:31

## 2024-01-19 RX ADMIN — ROCURONIUM BROMIDE 50 MG: 10 INJECTION, SOLUTION INTRAVENOUS at 09:25

## 2024-01-19 RX ADMIN — METOCLOPRAMIDE HYDROCHLORIDE 10 MG: 5 INJECTION INTRAMUSCULAR; INTRAVENOUS at 12:19

## 2024-01-19 RX ADMIN — SODIUM CHLORIDE, POTASSIUM CHLORIDE, SODIUM LACTATE AND CALCIUM CHLORIDE: 600; 310; 30; 20 INJECTION, SOLUTION INTRAVENOUS at 11:59

## 2024-01-19 RX ADMIN — CEFAZOLIN 2000 MG: 2 INJECTION, POWDER, FOR SOLUTION INTRAMUSCULAR; INTRAVENOUS at 09:34

## 2024-01-19 RX ADMIN — FENTANYL CITRATE 25 MCG: 50 INJECTION, SOLUTION INTRAMUSCULAR; INTRAVENOUS at 11:29

## 2024-01-19 ASSESSMENT — PAIN SCALES - GENERAL: PAINLEVEL_OUTOF10: 5

## 2024-01-19 ASSESSMENT — PAIN DESCRIPTION - LOCATION: LOCATION: ABDOMEN

## 2024-01-19 ASSESSMENT — PAIN DESCRIPTION - DESCRIPTORS: DESCRIPTORS: ACHING

## 2024-01-19 ASSESSMENT — PAIN - FUNCTIONAL ASSESSMENT: PAIN_FUNCTIONAL_ASSESSMENT: 0-10

## 2024-01-19 NOTE — ANESTHESIA POSTPROCEDURE EVALUATION
Department of Anesthesiology  Postprocedure Note    Patient: Nola Ryaa  MRN: [id number]  YOB: 1980  Date of evaluation: 1/19/2024    Procedure Summary       Date: 01/19/24 Room / Location: [address]    Anesthesia Start: 0919 Anesthesia Stop: 1132    Procedure: LAPAROSCOPIC ROBOTIC CHOLECYSTECTOMY WITH FIREFLY (Abdomen) Diagnosis:       Cholecystitis      (Cholecystitis [K81.9])    Surgeons: Turner Chávez IV, DO Responsible Provider: Abel Ngo MD    Anesthesia Type: general ASA Status: 3            Anesthesia Type: No value filed.    Noreen Phase I: Noreen Score: 8    Noreen Phase II:      Anesthesia Post Evaluation    Patient location during evaluation: PACU  Patient participation: complete - patient participated  Level of consciousness: awake and alert  Airway patency: patent  Nausea & Vomiting: no nausea and no vomiting  Cardiovascular status: hemodynamically stable  Respiratory status: room air and spontaneous ventilation  Hydration status: euvolemic  Multimodal analgesia pain management approach  Pain management: adequate    No notable events documented.

## 2024-01-19 NOTE — H&P
General Surgery   History and Physical      PATIENT NAME: Noal Raya   YOB: 1980    ADMISSION DATE: 1/19/2024  7:36 AM      TODAY'S DATE: 1/19/2024    CHIEF COMPLAINT:  Abdominal pain       HPI:  The patient is a 43 y.o. female  who presents with ongoing epigastric abdominal pain and pressure, intermittently over the past several months. Pain sometimes radiates to her back. She had been taking over the counter medications without relief. She states she has had a few \"attacks\" of pain since her last office visit. RUQ US performed 11/27/23 showed cholelithiasis. Denies any recent illness, fevers, chills or hospitalizations.     Past Medical History:        Diagnosis Date    Anxiety     Back pain     Chlamydia     Complication of anesthesia ‘95    Depression 2013    on antidepressant loss of ectopic pregnancy    Drug effect     Sulfa drugs; codeine    Dysthymia 04/22/2020    Ectopic pregnancy 7/2013    Encounter for counseling regarding contraception 02/10/2022    Gall stones     Herpes simplex without mention of complication     History of blood transfusion 2013    transfusion with ectopic pregnancy    Hypertension     No problem since 1/2015    Infertility, female     Obesity     PCOS (polycystic ovarian syndrome)     PONV (postoperative nausea and vomiting)     Prolonged emergence from general anesthesia     Seasonal allergies     Secondary hypertension     Trauma 2015    Uterine disorder 2006    septation noted, but not noted on any subsequent ultrasound       Past Surgical History:        Procedure Laterality Date    ECTOPIC PREGNANCY SURGERY  07/2013    Left fallopian tube removed    NASAL SEPTUM SURGERY  11/12/2012    with turbinate reduction and polypectomy, ENT- dr. darling    SALPINGO-OOPHORECTOMY Left 2013    Removal of left fallopian tube due to ectopic pregnan    TONSILLECTOMY         Medications Prior to Admission:   Medications Prior to Admission: norethindrone-ethinyl estradiol-iron  04/03/2021 10:08 AM    LYMPHSABS 1.82 04/03/2021 10:08 AM    EOSABS 0.18 04/03/2021 10:08 AM    BASOSABS 0.05 04/03/2021 10:08 AM    DIFFTYPE NOT REPORTED 04/03/2021 10:08 AM     BMP:    Lab Results   Component Value Date/Time     01/07/2023 12:58 PM    K 3.8 01/07/2023 12:58 PM     01/07/2023 12:58 PM    CO2 24 01/07/2023 12:58 PM    BUN 10 01/07/2023 12:58 PM    LABALBU 3.8 01/07/2023 12:58 PM    LABALBU 4.7 02/20/2012 09:35 AM    CREATININE 0.72 01/07/2023 12:58 PM    CALCIUM 8.8 01/07/2023 12:58 PM    GFRAA >60 02/12/2022 12:45 PM    LABGLOM >60 01/07/2023 12:58 PM    GLUCOSE 86 01/07/2023 12:58 PM    GLUCOSE 92 02/20/2012 09:35 AM       Pertinent Radiology:   No results found.        ASSESSMENT   43 year old female with symptomatic cholelithiasis     PLAN    Proceed with laparoscopic robotic cholecystectomy, possible open. The risks, benefits and alternatives to surgery were discussed with the patient and all questions answered. Written consent was obtained and witnessed.     -----------------------------------------------  Thea Franco DO PGY-4  1/19/2024 at 8:01 AM

## 2024-01-19 NOTE — ANESTHESIA PRE PROCEDURE
Department of Anesthesiology  Preprocedure Note       Name:  Nola Raya   Age:  43 y.o.  :  1980                                          MRN:  [id number]         Date:  2024      Surgeon: Surgeon(s):  Turner Chávez IV, DO    Procedure: Procedure(s):  LAPAROSCOPIC ROBOTIC CHOLECYSTECTOMY WITH FIREFLY    Medications prior to admission:   Prior to Admission medications    Medication Sig Start Date End Date Taking? Authorizing Provider   norethindrone-ethinyl estradiol-iron (BLISOVI FE 1.530) 1.5-30 MG-MCG tablet Take 1 tablet by mouth daily 23  Aleida Garcia APRN - CNP   metFORMIN (GLUCOPHAGE) 500 MG tablet Take 1 tablet by mouth 2 times daily (with meals) with meals 23   Aleida Garcia APRN - CNP   phentermine (ADIPEX-P) 37.5 MG tablet Take 1 tablet by mouth every morning (before breakfast) for 30 days. Max Daily Amount: 37.5 mg 23  Jolie Rojas APRN - NP   buPROPion (WELLBUTRIN XL) 150 MG extended release tablet Take 1 tablet by mouth every morning 23   Debbie Ramos MD   cetirizine (ZYRTEC) 10 MG tablet Take 1 tablet by mouth daily Indications: OTC    Provider, MD Ashley       Current medications:    Current Facility-Administered Medications   Medication Dose Route Frequency Provider Last Rate Last Admin   • ceFAZolin (ANCEF) 2,000 mg in sodium chloride 0.9 % 50 mL IVPB (mini-bag)  2,000 mg IntraVENous Once Turner Chávez IV, DO       • lidocaine PF 1 % injection 1 mL  1 mL IntraDERmal Once PRN Abel Ngo MD       • lactated ringers IV soln infusion   IntraVENous Continuous Abel Ngo  mL/hr at 24 New Bag at 24   • sodium chloride flush 0.9 % injection 5-40 mL  5-40 mL IntraVENous 2 times per day Abel Ngo MD       • sodium chloride flush 0.9 % injection 5-40 mL  5-40 mL IntraVENous PRN Abel Ngo MD       • 0.9 % sodium chloride infusion

## 2024-01-19 NOTE — OP NOTE
Operative Note      Patient: Nola Raya  YOB: 1980  MRN: [id number]    Date of Procedure: 1/19/2024    Pre-Op Diagnosis Codes:     * Cholecystitis [K81.9]    Post-Op Diagnosis: Same       Procedure:  Laparoscopic robotic assisted cholecystectomy    Surgeon(s):  Turner Chávez IV, DO    Assistant:   Thea Franco DO PGY-4    Anesthesia: General    Estimated Blood Loss (mL): Minimal    Complications: None    Specimens:   ID Type Source Tests Collected by Time Destination   A : Gallbladder and contents Tissue Gallbladder SURGICAL PATHOLOGY Turner Chávez IV, DO 1/19/2024 1031        Implants:  Implant Name Type Inv. Item Serial No.  Lot No. LRB No. Used Action   CLIP INT L POLYMER BOBO LIG HEM O BOBO (6EA/PK) - [id number]  CLIP INT L POLYMER BOBO LIG HEM O BOBO (6EA/PK)  [employer] [id number] N/A 1 Implanted         Drains: * No LDAs found *    Findings: Successful robotic cholecystectomy    Indications: Patient is a 43 year old female with intermittent epigastric abdominal pain with radiation into her back. US of the RUQ showed cholelithiasis. Decision was made to proceed to the operating room for laparoscopic robotic cholecystectomy, possible open. The risks, benefits, and alternatives to surgery were discussed with the patient and all questions were answered. Written consent was obtained and witnessed.     Detailed Description of Procedure:   The patient was brought to the operative suite and assisted onto the operating table in a supine position. A time out was made to verify correct patient, site of procedure and procedure type. A dose of antibiotics were given according to SCIP protocol. EPC cuffs were placed for DVT prophylaxis. A Perla Hugger was placed to keep the patient euthermic. General anesthesia was given and the patient was intubated with an ET tube. The patient's abdomen was prepped and draped in sterile fashion.    Entrance was gained into the abdomen with a  the only structures visualized entering into the gallbladder. Firefly was used to confirm identification of the cystic duct.  The critical view of safety was confirmed by myself and the surgical resident.  The cystic duct was ligated with double Hemoclips, and then divided with Endoshears. The cystic artery was ligated with a Hemoclip and divided with Endoshears. The gallbladder was dissected from the liver bed with electrocautery.  The gallbladder was then placed into the Endopouch and delivered from the body at the umbilicus incision.  The bed was then inspected.  Hemostasis was achieved with electrocautery. The cystic duct and artery stumps were identified. All clips remained on both the cystic artery and the cystic duct stump.  No sign of bile leak or bleeding from the duct or artery stump.     The da Tushar was undocked from the patient.  The left upper quadrant fascia was closed with an 0 Vicryl suture with a needle nose suture passer and a Bijan-Donald needle.  Working ports were removed.  All incisions were closed with simple subcuticular 4-0 Monocryl sutures.  Incisions were cleaned and dressed with Dermabond. All sponge, needle and instrument counts were correct prior to closure and the patient tolerated the procedure well without any apparent complication. The patient was extubated and then taken to recovery in stable condition.    Dr. Chávez was present for the entire procedure.    Electronically signed by Thea Franco DO on 1/19/2024 at 11:33 AM

## 2024-01-19 NOTE — ANESTHESIA PROCEDURE NOTES
Peripheral Block    Patient location during procedure: pre-op  Reason for block: post-op pain management and at surgeon's request  Start time: 1/19/2024 9:07 AM  End time: 1/19/2024 9:13 AM  Staffing  Performed: anesthesiologist   Anesthesiologist: Abel Ngo MD  Performed by: Abel Ngo MD  Authorized by: Abel Ngo MD    Preanesthetic Checklist  Completed: patient identified, IV checked, site marked, risks and benefits discussed, surgical/procedural consents, equipment checked, pre-op evaluation, timeout performed, anesthesia consent given, oxygen available, monitors applied/VS acknowledged, fire risk safety assessment completed and verbalized and blood product R/B/A discussed and consented  Peripheral Block   Patient position: supine  Prep: ChloraPrep  Provider prep: mask and sterile gloves  Patient monitoring: cardiac monitor, continuous pulse ox, frequent blood pressure checks, IV access, oxygen and responsive to questions  Block type: TAP  Laterality: bilateral  Injection technique: single-shot  Guidance: ultrasound guided    Needle   Needle type: insulated echogenic nerve stimulator needle   Needle gauge: 20 G  Needle localization: anatomical landmarks and ultrasound guidance  Needle length: 4 IN.  Assessment   Injection assessment: negative aspiration for heme, no paresthesia on injection, local visualized surrounding nerve on ultrasound and no intravascular symptoms  Paresthesia pain: none  Slow fractionated injection: yes  Hemodynamics: stableno  Outcomes: uncomplicated and patient tolerated procedure well    Additional Notes  10MG DECADRON ADDED TO LOCAL ANESTHETIC SOLUTION  Medications Administered  BUPivacaine (MARCAINE) PF injection 0.25% - Perineural   80 mL - 1/19/2024 9:13:00 AM

## 2024-01-19 NOTE — DISCHARGE INSTRUCTIONS
Patient Discharge Instructions  Discharge Date:  1/19/24     Discharged To:  Home     Home with Home Health Care: No     RESUME ACTIVITY:      BATHING: May shower in 24 hours, leave Dermabond on, wash gently, no creams/lotions/ointments over Dermabond     DRIVING: No driving on narcotics     WALKING:  Yes     STAIRS:  Yes     LIFTING: Less than 10 pounds until instructed otherwise      DIET: common adult     SPECIAL INSTRUCTIONS:      May use ice pack for pain/swelling as much as tolerated for next 1-2 weeks     May use Motrin or Aleve for breakthrough pain     Activity  You have had anesthesia today  Do not drive, operate heavy equipment, consume alcoholic beverages, or make any important decisions  for 24 hours   If you are taking pain medication: Do not drive or consume alcohol.  Take your time changing positions today. You may feel light headed or dizzy if you move too quickly.   Continue your home medications as ordered by your physician.  Diet   You can eat your normal diet when you feel well. You should start off with bland foods like chicken soup, toast, or yogurt. Then advance as tolerated.  Drink plenty of fluids (unless your doctor tells you not to). Your urine should be very lightly colored without a strong odor.

## 2024-01-19 NOTE — BRIEF OP NOTE
Brief Postoperative Note      Patient: Nola Raya  YOB: 1980  MRN: [id number]    Date of Procedure: 1/19/2024    Pre-Op Diagnosis Codes:     * Cholecystitis [K81.9]    Post-Op Diagnosis: Same       Procedure(s):  LAPAROSCOPIC ROBOTIC CHOLECYSTECTOMY WITH FIREFLY    Surgeon(s):  Turner Chávez IV, DO    Assistant:  Thea Franco DO PGY-4    Anesthesia: General    Estimated Blood Loss (mL): Minimal    Complications: None    Specimens:   ID Type Source Tests Collected by Time Destination   A : Gallbladder and contents Tissue Gallbladder SURGICAL PATHOLOGY Turner Chávez IV, DO 1/19/2024 1031        Implants:  Implant Name Type Inv. Item Serial No.  Lot No. LRB No. Used Action   CLIP INT L POLYMER BOBO LIG HEM O BOBO (6EA/PK) - [id number]  CLIP INT L POLYMER BOBO LIG HEM O BOBO (6EA/PK)  [employer] [id number] N/A 1 Implanted         Drains: * No LDAs found *    Findings: Successful robotic cholecystectomy      Electronically signed by Thea Franco DO on 1/19/2024 at 11:23 AM

## 2024-01-22 ENCOUNTER — TELEPHONE (OUTPATIENT)
Dept: FAMILY MEDICINE CLINIC | Age: 44
End: 2024-01-22

## 2024-01-22 LAB — SURGICAL PATHOLOGY REPORT: NORMAL

## 2024-01-23 NOTE — TELEPHONE ENCOUNTER
Received [employer] message regarding patient's having abd pain after surgery.  I couldn't respond back to the Health link message on [employer]. Patient is not seen by [facility] physician office at [facility].   Called patient and explained to her that she needs to call the number again , and tell them to get her connected with her PCP or Surgery office.   ED precautions provided to patient,  and to try to contact her PCP again.   Patient showed understanding.     Percy Trujilol MD  Family medicine resident, PGY2  1/22/2024 at 8:00 PM

## 2024-01-24 ENCOUNTER — OFFICE VISIT (OUTPATIENT)
Dept: SURGERY | Age: 44
End: 2024-01-24
Payer: COMMERCIAL

## 2024-01-24 VITALS
DIASTOLIC BLOOD PRESSURE: 97 MMHG | HEIGHT: 67 IN | SYSTOLIC BLOOD PRESSURE: 132 MMHG | HEART RATE: 95 BPM | WEIGHT: 248.2 LBS | BODY MASS INDEX: 38.96 KG/M2

## 2024-01-24 DIAGNOSIS — Z90.49 S/P CHOLECYSTECTOMY: Primary | ICD-10-CM

## 2024-01-24 PROCEDURE — 99213 OFFICE O/P EST LOW 20 MIN: CPT | Performed by: STUDENT IN AN ORGANIZED HEALTH CARE EDUCATION/TRAINING PROGRAM

## 2024-01-24 PROCEDURE — 1036F TOBACCO NON-USER: CPT | Performed by: STUDENT IN AN ORGANIZED HEALTH CARE EDUCATION/TRAINING PROGRAM

## 2024-01-24 PROCEDURE — G8427 DOCREV CUR MEDS BY ELIG CLIN: HCPCS | Performed by: STUDENT IN AN ORGANIZED HEALTH CARE EDUCATION/TRAINING PROGRAM

## 2024-01-24 PROCEDURE — G8417 CALC BMI ABV UP PARAM F/U: HCPCS | Performed by: STUDENT IN AN ORGANIZED HEALTH CARE EDUCATION/TRAINING PROGRAM

## 2024-01-24 PROCEDURE — G8484 FLU IMMUNIZE NO ADMIN: HCPCS | Performed by: STUDENT IN AN ORGANIZED HEALTH CARE EDUCATION/TRAINING PROGRAM

## 2024-01-24 RX ORDER — POLYETHYLENE GLYCOL 3350 17 G/17G
17 POWDER, FOR SOLUTION ORAL DAILY
Qty: 510 G | Refills: 0 | Status: SHIPPED | OUTPATIENT
Start: 2024-01-24 | End: 2024-02-23

## 2024-01-24 NOTE — PROGRESS NOTES
Visit Information    Have you changed or started any medications since your last visit including any over-the-counter medicines, vitamins, or herbal medicines? no   Are you having any side effects from any of your medications? -  no  Have you stopped taking any of your medications? Is so, why? -  no    Have you seen any other physician or provider since your last visit? Yes - Records Requested  Have you had any other diagnostic tests since your last visit? Yes - Records Requested  Have you been seen in the emergency room and/or had an admission to a hospital since we last saw you? No  Have you had your routine dental cleaning in the past 6 months? no    Have you activated your [employer] account? If not, what are your barriers? Yes     Patient Care Team:  Jolie Rojas APRN - NP as PCP - General (Nurse Practitioner)  Jolie Rojsa APRN - NP as PCP - Empaneled Provider  Valentino Dickson MD as Obstetrician (Perinatology)  Aleida Garcia APRN - SONU (Nurse Practitioner)    Medical History Review  Past Medical, Family, and Social History reviewed and does not contribute to the patient presenting condition    Health Maintenance   Topic Date Due    Flu vaccine (1) 08/24/2024 (Originally 8/1/2023)    COVID-19 Vaccine (1) 08/24/2024 (Originally 1980)    Hepatitis B vaccine (1 of 3 - 3-dose series) 09/25/2024 (Originally 1980)    Cervical cancer screen  10/12/2024    Depression Monitoring  12/28/2024    Lipids  08/13/2027    DTaP/Tdap/Td vaccine (2 - Td or Tdap) 11/07/2027    Hepatitis C screen  Completed    HIV screen  Completed    Hepatitis A vaccine  Aged Out    Hib vaccine  Aged Out    HPV vaccine  Aged Out    Polio vaccine  Aged Out    Meningococcal (ACWY) vaccine  Aged Out    Pneumococcal 0-64 years Vaccine  Aged Out    Varicella vaccine  Discontinued

## 2024-01-24 NOTE — PATIENT INSTRUCTIONS
Thank you letting us take care of you today. We hope that all your questions were addressed. If a question was overlooked or something else comes to mind after you return home, please call our office at [phone].    If you need to cancel or change an appointment, surgery or procedure, please contact the office at [phone].       [facility] Office Number:  [phone]

## 2024-01-24 NOTE — PROGRESS NOTES
History and Physical  [facility]    Patient's Name/Date of Birth: Nola Templetonnt / 1980 (43 y.o.)    Date: January 24, 2024     HPI: Pt is a 43 y.o. female who presents to [facility] for post operative evaluation. She is s/p robotic cholecystectomy 1/19/24. Patient states she is feeling well overall since surgery. She states her pain is controlled, and she hasn't really been taking her narcotic pain medication. She is taking ibuprofen as needed. She reports some decrease in her appetite and mild intermittent nausea, but denies any vomiting or problems taking in PO medication. Patient states she has had a bowel movement since surgery but still feels constipated and is not going as often as normal. She has been taking Colace with no change. She denies any fevers or chills.       Past Medical History:   Diagnosis Date    Anxiety     Back pain     Chlamydia     Complication of anesthesia ‘95    Depression 2013    on antidepressant loss of ectopic pregnancy    Drug effect     Sulfa drugs; codeine    Dysthymia 04/22/2020    Ectopic pregnancy 7/2013    Encounter for counseling regarding contraception 02/10/2022    Gall stones     Herpes simplex without mention of complication     History of blood transfusion 2013    transfusion with ectopic pregnancy    Hypertension     No problem since 1/2015    Infertility, female     Obesity     PCOS (polycystic ovarian syndrome)     PONV (postoperative nausea and vomiting)     Prolonged emergence from general anesthesia     Seasonal allergies     Secondary hypertension     Trauma 2015    Uterine disorder 2006    septation noted, but not noted on any subsequent ultrasound       Past Surgical History:   Procedure Laterality Date    CHOLECYSTECTOMY, LAPAROSCOPIC N/A 1/19/2024    LAPAROSCOPIC ROBOTIC CHOLECYSTECTOMY WITH FIREFLY performed by Turner Chávez IV, DO at [facility] OR    ECTOPIC PREGNANCY SURGERY  07/2013    Left fallopian tube removed

## 2024-01-25 DIAGNOSIS — E66.01 CLASS 3 SEVERE OBESITY WITHOUT SERIOUS COMORBIDITY WITH BODY MASS INDEX (BMI) OF 45.0 TO 49.9 IN ADULT, UNSPECIFIED OBESITY TYPE (HCC): ICD-10-CM

## 2024-01-25 RX ORDER — PHENTERMINE HYDROCHLORIDE 37.5 MG/1
37.5 TABLET ORAL
Qty: 30 TABLET | Refills: 0 | Status: SHIPPED | OUTPATIENT
Start: 2024-01-25 | End: 2024-02-24

## 2024-01-25 NOTE — TELEPHONE ENCOUNTER
Last Visit:  11/17/2023     Next Visit Date:  Future Appointments   Date Time Provider Department Center   2/22/2024  8:30 AM Luthy, Jolie, APRN - NP [facility]

## 2024-02-22 ENCOUNTER — OFFICE VISIT (OUTPATIENT)
Dept: FAMILY MEDICINE CLINIC | Age: 44
End: 2024-02-22
Payer: COMMERCIAL

## 2024-02-22 VITALS
BODY MASS INDEX: 39.02 KG/M2 | OXYGEN SATURATION: 99 % | DIASTOLIC BLOOD PRESSURE: 86 MMHG | SYSTOLIC BLOOD PRESSURE: 130 MMHG | HEIGHT: 67 IN | WEIGHT: 248.6 LBS | RESPIRATION RATE: 20 BRPM | HEART RATE: 68 BPM

## 2024-02-22 DIAGNOSIS — E66.01 CLASS 3 SEVERE OBESITY WITHOUT SERIOUS COMORBIDITY WITH BODY MASS INDEX (BMI) OF 45.0 TO 49.9 IN ADULT, UNSPECIFIED OBESITY TYPE (HCC): ICD-10-CM

## 2024-02-22 PROCEDURE — 99213 OFFICE O/P EST LOW 20 MIN: CPT | Performed by: NURSE PRACTITIONER

## 2024-02-22 PROCEDURE — G8417 CALC BMI ABV UP PARAM F/U: HCPCS | Performed by: NURSE PRACTITIONER

## 2024-02-22 PROCEDURE — G8484 FLU IMMUNIZE NO ADMIN: HCPCS | Performed by: NURSE PRACTITIONER

## 2024-02-22 PROCEDURE — G8427 DOCREV CUR MEDS BY ELIG CLIN: HCPCS | Performed by: NURSE PRACTITIONER

## 2024-02-22 PROCEDURE — 1036F TOBACCO NON-USER: CPT | Performed by: NURSE PRACTITIONER

## 2024-02-22 RX ORDER — PHENTERMINE HYDROCHLORIDE 37.5 MG/1
37.5 TABLET ORAL
Qty: 30 TABLET | Refills: 0 | Status: SHIPPED | OUTPATIENT
Start: 2024-02-22 | End: 2024-03-23

## 2024-02-22 ASSESSMENT — PATIENT HEALTH QUESTIONNAIRE - PHQ9
SUM OF ALL RESPONSES TO PHQ QUESTIONS 1-9: 0
8. MOVING OR SPEAKING SO SLOWLY THAT OTHER PEOPLE COULD HAVE NOTICED. OR THE OPPOSITE, BEING SO FIGETY OR RESTLESS THAT YOU HAVE BEEN MOVING AROUND A LOT MORE THAN USUAL: 0
6. FEELING BAD ABOUT YOURSELF - OR THAT YOU ARE A FAILURE OR HAVE LET YOURSELF OR YOUR FAMILY DOWN: 0
9. THOUGHTS THAT YOU WOULD BE BETTER OFF DEAD, OR OF HURTING YOURSELF: 0
1. LITTLE INTEREST OR PLEASURE IN DOING THINGS: 0
SUM OF ALL RESPONSES TO PHQ QUESTIONS 1-9: 0
3. TROUBLE FALLING OR STAYING ASLEEP: 0
SUM OF ALL RESPONSES TO PHQ9 QUESTIONS 1 & 2: 0
4. FEELING TIRED OR HAVING LITTLE ENERGY: 0
2. FEELING DOWN, DEPRESSED OR HOPELESS: 0
7. TROUBLE CONCENTRATING ON THINGS, SUCH AS READING THE NEWSPAPER OR WATCHING TELEVISION: 0
SUM OF ALL RESPONSES TO PHQ QUESTIONS 1-9: 0
10. IF YOU CHECKED OFF ANY PROBLEMS, HOW DIFFICULT HAVE THESE PROBLEMS MADE IT FOR YOU TO DO YOUR WORK, TAKE CARE OF THINGS AT HOME, OR GET ALONG WITH OTHER PEOPLE: 0
5. POOR APPETITE OR OVEREATING: 0
SUM OF ALL RESPONSES TO PHQ QUESTIONS 1-9: 0

## 2024-02-22 ASSESSMENT — ENCOUNTER SYMPTOMS
BACK PAIN: 0
NAUSEA: 0
SHORTNESS OF BREATH: 0
EYE PAIN: 0
SINUS PRESSURE: 0
VOMITING: 0
CHEST TIGHTNESS: 0
COLOR CHANGE: 0
DIARRHEA: 0
CONSTIPATION: 0
ABDOMINAL PAIN: 0
SINUS PAIN: 0

## 2024-02-22 NOTE — PROGRESS NOTES
Nola Raya (:  1980) is a 43 y.o. female,Established patient, here for evaluation of the following chief complaint(s):  Obesity and Health Maintenance (Depression screen completed. /)         ASSESSMENT/PLAN:  1. Class 3 severe obesity without serious comorbidity with body mass index (BMI) of 45.0 to 49.9 in adult, unspecified obesity type (HCC)  -     phentermine (ADIPEX-P) 37.5 MG tablet; Take 1 tablet by mouth every morning (before breakfast) for 30 days. Max Daily Amount: 37.5 mg, Disp-30 tablet, R-0Normal    Has shown progressive weight loss since last OV 2023 of 5lbs. Continue healthy diet - increased water intake. Slowly work on increasing activity levels once cleared by general surgery.    Will continue adipex today for additional 90 days. Will begin taper at 12 months of consecutive use.     PDMP Monitoring:    Last PDMP Alexander as Reviewed:  Review User Review Instant Review Result   WHIT PENG 2024  9:31 AM Reviewed PDMP [1]       Urine Drug Screenings (1 yr)       Drugs of Abuse, Urine  Collected: 3/28/2017  8:57 PM (Final result)                  Medication Contract and Consent for Opioid Use Documents Filed        No documents found                      Return in about 3 months (around 2024) for Weight Check.         Subjective   SUBJECTIVE/OBJECTIVE:  Presents for 3 month f/u of adipex  Down 5lbs since last OV. Frustrated, was hoping it would be more.   Todays BMI 38.93  Denies s/e with use of med     Recent cholecystectomy 2024  Healing well, still has some continued surgical incision tenderness  Started daily miralax earlier this week to help keep BMs regular, no longer taking colace    Denies any other problems/concerns at this time             Review of Systems   Constitutional:  Negative for activity change, chills, fatigue and unexpected weight change.   HENT:  Negative for hearing loss, postnasal drip, sinus pressure and sinus pain.    Eyes:  Negative for

## 2024-02-25 DIAGNOSIS — E66.01 CLASS 3 SEVERE OBESITY WITHOUT SERIOUS COMORBIDITY WITH BODY MASS INDEX (BMI) OF 45.0 TO 49.9 IN ADULT, UNSPECIFIED OBESITY TYPE (HCC): ICD-10-CM

## 2024-02-26 RX ORDER — PHENTERMINE HYDROCHLORIDE 37.5 MG/1
37.5 TABLET ORAL
Qty: 30 TABLET | Refills: 0 | OUTPATIENT
Start: 2024-02-26 | End: 2024-03-27

## 2024-03-26 ENCOUNTER — PATIENT MESSAGE (OUTPATIENT)
Dept: FAMILY MEDICINE CLINIC | Age: 44
End: 2024-03-26

## 2024-03-26 DIAGNOSIS — E66.01 CLASS 3 SEVERE OBESITY WITHOUT SERIOUS COMORBIDITY WITH BODY MASS INDEX (BMI) OF 45.0 TO 49.9 IN ADULT, UNSPECIFIED OBESITY TYPE (HCC): ICD-10-CM

## 2024-03-27 RX ORDER — PHENTERMINE HYDROCHLORIDE 37.5 MG/1
37.5 TABLET ORAL
Qty: 30 TABLET | Refills: 0 | Status: SHIPPED | OUTPATIENT
Start: 2024-03-27 | End: 2024-04-26

## 2024-03-27 NOTE — TELEPHONE ENCOUNTER
From: Nola Raya  To: Jolie Rojas  Sent: 3/26/2024 9:19 PM EDT  Subject: Phentermine    Evening. I will need this refilled by Thursday. I have 2 left, one for Wednesday and one for Thursday. Thank you :)

## 2024-03-31 DIAGNOSIS — E28.2 PCOS (POLYCYSTIC OVARIAN SYNDROME): ICD-10-CM

## 2024-04-22 DIAGNOSIS — E66.01 CLASS 3 SEVERE OBESITY WITHOUT SERIOUS COMORBIDITY WITH BODY MASS INDEX (BMI) OF 45.0 TO 49.9 IN ADULT, UNSPECIFIED OBESITY TYPE (HCC): ICD-10-CM

## 2024-04-24 RX ORDER — PHENTERMINE HYDROCHLORIDE 37.5 MG/1
37.5 TABLET ORAL
Qty: 30 TABLET | Refills: 0 | Status: SHIPPED | OUTPATIENT
Start: 2024-04-24 | End: 2024-05-24

## 2024-05-01 ENCOUNTER — HOSPITAL ENCOUNTER (OUTPATIENT)
Dept: MAMMOGRAPHY | Age: 44
Discharge: HOME | End: 2024-05-03
Payer: COMMERCIAL

## 2024-05-01 VITALS — BODY MASS INDEX: 38.45 KG/M2 | HEIGHT: 67 IN | WEIGHT: 245 LBS

## 2024-05-01 DIAGNOSIS — Z12.31 ENCOUNTER FOR SCREENING MAMMOGRAM FOR BREAST CANCER: ICD-10-CM

## 2024-05-01 PROCEDURE — 77063 BREAST TOMOSYNTHESIS BI: CPT

## 2024-05-22 ENCOUNTER — OFFICE VISIT (OUTPATIENT)
Dept: FAMILY MEDICINE CLINIC | Age: 44
End: 2024-05-22
Payer: COMMERCIAL

## 2024-05-22 VITALS
SYSTOLIC BLOOD PRESSURE: 128 MMHG | WEIGHT: 244.6 LBS | DIASTOLIC BLOOD PRESSURE: 82 MMHG | HEART RATE: 89 BPM | OXYGEN SATURATION: 100 % | RESPIRATION RATE: 18 BRPM | BODY MASS INDEX: 38.39 KG/M2 | HEIGHT: 67 IN

## 2024-05-22 DIAGNOSIS — Z13.0 SCREENING FOR DEFICIENCY ANEMIA: ICD-10-CM

## 2024-05-22 DIAGNOSIS — E66.01 CLASS 3 SEVERE OBESITY WITHOUT SERIOUS COMORBIDITY WITH BODY MASS INDEX (BMI) OF 45.0 TO 49.9 IN ADULT, UNSPECIFIED OBESITY TYPE (HCC): Primary | ICD-10-CM

## 2024-05-22 DIAGNOSIS — E28.2 PCOS (POLYCYSTIC OVARIAN SYNDROME): ICD-10-CM

## 2024-05-22 DIAGNOSIS — K59.00 CONSTIPATION, UNSPECIFIED CONSTIPATION TYPE: ICD-10-CM

## 2024-05-22 DIAGNOSIS — Z13.220 SCREENING FOR HYPERLIPIDEMIA: ICD-10-CM

## 2024-05-22 PROCEDURE — 1036F TOBACCO NON-USER: CPT | Performed by: NURSE PRACTITIONER

## 2024-05-22 PROCEDURE — G8417 CALC BMI ABV UP PARAM F/U: HCPCS | Performed by: NURSE PRACTITIONER

## 2024-05-22 PROCEDURE — G8427 DOCREV CUR MEDS BY ELIG CLIN: HCPCS | Performed by: NURSE PRACTITIONER

## 2024-05-22 PROCEDURE — 99214 OFFICE O/P EST MOD 30 MIN: CPT | Performed by: NURSE PRACTITIONER

## 2024-05-22 RX ORDER — PHENTERMINE HYDROCHLORIDE 37.5 MG/1
37.5 TABLET ORAL
Qty: 30 TABLET | Refills: 0 | Status: SHIPPED | OUTPATIENT
Start: 2024-05-22 | End: 2024-06-21

## 2024-05-22 RX ORDER — DOCUSATE SODIUM 100 MG/1
100 CAPSULE, LIQUID FILLED ORAL 2 TIMES DAILY
Qty: 60 CAPSULE | Refills: 0 | Status: SHIPPED | OUTPATIENT
Start: 2024-05-22 | End: 2024-06-21

## 2024-05-22 ASSESSMENT — ENCOUNTER SYMPTOMS
SINUS PAIN: 0
ABDOMINAL PAIN: 0
NAUSEA: 0
EYE PAIN: 0
SINUS PRESSURE: 0
SHORTNESS OF BREATH: 0
VOMITING: 0
CHEST TIGHTNESS: 0
COLOR CHANGE: 0
CONSTIPATION: 1
BACK PAIN: 0
DIARRHEA: 0

## 2024-05-22 NOTE — PROGRESS NOTES
weight change.   HENT:  Negative for hearing loss, postnasal drip, sinus pressure and sinus pain.    Eyes:  Negative for pain and visual disturbance.   Respiratory:  Negative for chest tightness and shortness of breath.    Cardiovascular:  Negative for chest pain and palpitations.   Gastrointestinal:  Positive for constipation. Negative for abdominal pain, diarrhea, nausea and vomiting.   Endocrine: Negative for polydipsia, polyphagia and polyuria.   Genitourinary:  Negative for dysuria, flank pain, frequency and urgency.   Musculoskeletal:  Negative for arthralgias, back pain and myalgias.   Skin:  Negative for color change and rash.   Neurological:  Negative for dizziness, weakness, light-headedness, numbness and headaches.   Psychiatric/Behavioral:  Negative for confusion, hallucinations, self-injury, sleep disturbance and suicidal ideas. The patient is not nervous/anxious.           Objective   Physical Exam  Vitals and nursing note reviewed.   Constitutional:       Appearance: Normal appearance.   HENT:      Head: Normocephalic.      Right Ear: Hearing normal.      Left Ear: Hearing normal.      Nose: Nose normal.      Mouth/Throat:      Mouth: Mucous membranes are moist.      Pharynx: Oropharynx is clear.   Eyes:      Extraocular Movements: Extraocular movements intact.      Conjunctiva/sclera: Conjunctivae normal.      Pupils: Pupils are equal, round, and reactive to light.   Cardiovascular:      Rate and Rhythm: Normal rate and regular rhythm.      Pulses: Normal pulses.      Heart sounds: Normal heart sounds.   Pulmonary:      Effort: Pulmonary effort is normal.      Breath sounds: Normal breath sounds.   Abdominal:      General: Abdomen is flat. Bowel sounds are normal.      Palpations: Abdomen is soft.   Musculoskeletal:         General: Normal range of motion.      Cervical back: Normal range of motion.   Skin:     General: Skin is warm and dry.      Capillary Refill: Capillary refill takes less than 2

## 2024-05-27 DIAGNOSIS — E66.01 CLASS 3 SEVERE OBESITY WITHOUT SERIOUS COMORBIDITY WITH BODY MASS INDEX (BMI) OF 45.0 TO 49.9 IN ADULT, UNSPECIFIED OBESITY TYPE (HCC): ICD-10-CM

## 2024-05-28 RX ORDER — PHENTERMINE HYDROCHLORIDE 37.5 MG/1
37.5 TABLET ORAL
Qty: 30 TABLET | Refills: 0 | OUTPATIENT
Start: 2024-05-28 | End: 2024-06-27

## 2024-06-15 DIAGNOSIS — F33.41 RECURRENT MAJOR DEPRESSIVE DISORDER, IN PARTIAL REMISSION (HCC): ICD-10-CM

## 2024-06-17 RX ORDER — BUPROPION HYDROCHLORIDE 150 MG/1
150 TABLET ORAL EVERY MORNING
Qty: 90 TABLET | Refills: 1 | Status: SHIPPED | OUTPATIENT
Start: 2024-06-17

## 2024-06-17 NOTE — TELEPHONE ENCOUNTER
COVID-19 vaccination declined     TRACIE positive     Encounter for counseling regarding contraception     Vitamin D deficiency     Gastroesophageal reflux disease without esophagitis     Influenza vaccination declined

## 2024-06-24 ENCOUNTER — PATIENT MESSAGE (OUTPATIENT)
Dept: FAMILY MEDICINE CLINIC | Age: 44
End: 2024-06-24

## 2024-06-24 DIAGNOSIS — E28.2 PCOS (POLYCYSTIC OVARIAN SYNDROME): ICD-10-CM

## 2024-06-24 DIAGNOSIS — E66.01 CLASS 3 SEVERE OBESITY WITHOUT SERIOUS COMORBIDITY WITH BODY MASS INDEX (BMI) OF 45.0 TO 49.9 IN ADULT, UNSPECIFIED OBESITY TYPE (HCC): ICD-10-CM

## 2024-06-24 RX ORDER — PHENTERMINE HYDROCHLORIDE 37.5 MG/1
37.5 TABLET ORAL
Qty: 30 TABLET | Refills: 0 | Status: SHIPPED | OUTPATIENT
Start: 2024-06-24 | End: 2024-07-24

## 2024-06-24 NOTE — TELEPHONE ENCOUNTER
From: Nola Raya  To: Jolie Rojas  Sent: 6/24/2024 1:41 AM EDT  Subject: Phentermine    I have enough through Thursday morning but I am leaving for vacation on Saturday and will need the refill before then. Thank you

## 2024-07-10 ENCOUNTER — HOSPITAL ENCOUNTER (OUTPATIENT)
Age: 44
Discharge: HOME | End: 2024-07-10
Payer: COMMERCIAL

## 2024-07-10 DIAGNOSIS — Z13.0 SCREENING FOR DEFICIENCY ANEMIA: ICD-10-CM

## 2024-07-10 DIAGNOSIS — Z13.220 SCREENING FOR HYPERLIPIDEMIA: ICD-10-CM

## 2024-07-10 DIAGNOSIS — E28.2 PCOS (POLYCYSTIC OVARIAN SYNDROME): ICD-10-CM

## 2024-07-10 LAB
ALBUMIN SERPL-MCNC: 4.1 G/DL (ref 3.5–5.2)
ALBUMIN/GLOB SERPL: 1 {RATIO} (ref 1–2.5)
ALP SERPL-CCNC: 55 U/L (ref 35–104)
ALT SERPL-CCNC: 20 U/L (ref 10–35)
ANION GAP SERPL CALCULATED.3IONS-SCNC: 9 MMOL/L (ref 9–16)
AST SERPL-CCNC: 20 U/L (ref 10–35)
BASOPHILS # BLD: 0.04 K/UL (ref 0–0.2)
BASOPHILS NFR BLD: 1 % (ref 0–2)
BILIRUB SERPL-MCNC: 0.4 MG/DL (ref 0–1.2)
BUN SERPL-MCNC: 7 MG/DL (ref 6–20)
CALCIUM SERPL-MCNC: 9.1 MG/DL (ref 8.6–10.4)
CHLORIDE SERPL-SCNC: 103 MMOL/L (ref 98–107)
CHOLEST SERPL-MCNC: 169 MG/DL (ref 0–199)
CHOLESTEROL/HDL RATIO: 3
CO2 SERPL-SCNC: 25 MMOL/L (ref 20–31)
CREAT SERPL-MCNC: 0.7 MG/DL (ref 0.5–0.9)
EOSINOPHIL # BLD: 0.19 K/UL (ref 0–0.44)
EOSINOPHILS RELATIVE PERCENT: 3 % (ref 1–4)
ERYTHROCYTE [DISTWIDTH] IN BLOOD BY AUTOMATED COUNT: 12.3 % (ref 11.8–14.4)
EST. AVERAGE GLUCOSE BLD GHB EST-MCNC: 103 MG/DL
GFR, ESTIMATED: >90 ML/MIN/1.73M2
GLUCOSE SERPL-MCNC: 91 MG/DL (ref 74–99)
HBA1C MFR BLD: 5.2 % (ref 4–6)
HCT VFR BLD AUTO: 43.1 % (ref 36.3–47.1)
HDLC SERPL-MCNC: 51 MG/DL
HGB BLD-MCNC: 14.3 G/DL (ref 11.9–15.1)
IMM GRANULOCYTES # BLD AUTO: <0.03 K/UL (ref 0–0.3)
IMM GRANULOCYTES NFR BLD: 0 %
LDLC SERPL CALC-MCNC: 101 MG/DL (ref 0–100)
LYMPHOCYTES NFR BLD: 1.78 K/UL (ref 1.1–3.7)
LYMPHOCYTES RELATIVE PERCENT: 29 % (ref 24–43)
MCH RBC QN AUTO: 30.2 PG (ref 25.2–33.5)
MCHC RBC AUTO-ENTMCNC: 33.2 G/DL (ref 28.4–34.8)
MCV RBC AUTO: 90.9 FL (ref 82.6–102.9)
MONOCYTES NFR BLD: 0.52 K/UL (ref 0.1–1.2)
MONOCYTES NFR BLD: 8 % (ref 3–12)
NEUTROPHILS NFR BLD: 59 % (ref 36–65)
NEUTS SEG NFR BLD: 3.63 K/UL (ref 1.5–8.1)
NRBC BLD-RTO: 0 PER 100 WBC
PLATELET # BLD AUTO: 366 K/UL (ref 138–453)
PMV BLD AUTO: 8.9 FL (ref 8.1–13.5)
POTASSIUM SERPL-SCNC: 3.8 MMOL/L (ref 3.7–5.3)
PROT SERPL-MCNC: 7 G/DL (ref 6.6–8.7)
RBC # BLD AUTO: 4.74 M/UL (ref 3.95–5.11)
SODIUM SERPL-SCNC: 137 MMOL/L (ref 136–145)
TRIGL SERPL-MCNC: 88 MG/DL
VLDLC SERPL CALC-MCNC: 18 MG/DL
WBC OTHER # BLD: 6.2 K/UL (ref 3.5–11.3)

## 2024-07-10 PROCEDURE — 80053 COMPREHEN METABOLIC PANEL: CPT

## 2024-07-10 PROCEDURE — 80061 LIPID PANEL: CPT

## 2024-07-10 PROCEDURE — 83036 HEMOGLOBIN GLYCOSYLATED A1C: CPT

## 2024-07-10 PROCEDURE — 36415 COLL VENOUS BLD VENIPUNCTURE: CPT

## 2024-07-10 PROCEDURE — 85025 COMPLETE CBC W/AUTO DIFF WBC: CPT

## 2024-07-29 ENCOUNTER — PATIENT MESSAGE (OUTPATIENT)
Dept: FAMILY MEDICINE CLINIC | Age: 44
End: 2024-07-29

## 2024-07-29 DIAGNOSIS — E66.01 CLASS 3 SEVERE OBESITY WITHOUT SERIOUS COMORBIDITY WITH BODY MASS INDEX (BMI) OF 45.0 TO 49.9 IN ADULT, UNSPECIFIED OBESITY TYPE: ICD-10-CM

## 2024-07-29 DIAGNOSIS — E66.01 CLASS 3 SEVERE OBESITY WITHOUT SERIOUS COMORBIDITY WITH BODY MASS INDEX (BMI) OF 45.0 TO 49.9 IN ADULT, UNSPECIFIED OBESITY TYPE (HCC): ICD-10-CM

## 2024-07-29 RX ORDER — PHENTERMINE HYDROCHLORIDE 37.5 MG/1
37.5 TABLET ORAL
Qty: 30 TABLET | Refills: 0 | Status: SHIPPED | OUTPATIENT
Start: 2024-07-29 | End: 2024-08-28

## 2024-07-29 RX ORDER — PHENTERMINE HYDROCHLORIDE 37.5 MG/1
TABLET ORAL
Qty: 30 TABLET | OUTPATIENT
Start: 2024-07-29

## 2024-07-29 NOTE — TELEPHONE ENCOUNTER
From: Nola Raya  To: Jolie Rojas  Sent: 7/29/2024 7:30 AM EDT  Subject: Phentermine    I was unaware that I am out of this medication as of last night. Can you please call it in so I’m able to pick it up today? Thank you

## 2024-07-30 RX ORDER — PHENTERMINE HYDROCHLORIDE 37.5 MG/1
37.5 TABLET ORAL
Qty: 30 TABLET | Refills: 0 | OUTPATIENT
Start: 2024-07-30 | End: 2024-08-29

## 2024-08-22 ENCOUNTER — OFFICE VISIT (OUTPATIENT)
Dept: FAMILY MEDICINE CLINIC | Age: 44
End: 2024-08-22
Payer: COMMERCIAL

## 2024-08-22 VITALS
HEIGHT: 67 IN | OXYGEN SATURATION: 100 % | WEIGHT: 248 LBS | BODY MASS INDEX: 38.92 KG/M2 | SYSTOLIC BLOOD PRESSURE: 126 MMHG | HEART RATE: 85 BPM | RESPIRATION RATE: 18 BRPM | DIASTOLIC BLOOD PRESSURE: 80 MMHG

## 2024-08-22 DIAGNOSIS — E66.9 CLASS 2 OBESITY WITHOUT SERIOUS COMORBIDITY WITH BODY MASS INDEX (BMI) OF 38.0 TO 38.9 IN ADULT, UNSPECIFIED OBESITY TYPE: Primary | ICD-10-CM

## 2024-08-22 PROCEDURE — 99213 OFFICE O/P EST LOW 20 MIN: CPT | Performed by: NURSE PRACTITIONER

## 2024-08-22 PROCEDURE — 1036F TOBACCO NON-USER: CPT | Performed by: NURSE PRACTITIONER

## 2024-08-22 PROCEDURE — G8427 DOCREV CUR MEDS BY ELIG CLIN: HCPCS | Performed by: NURSE PRACTITIONER

## 2024-08-22 PROCEDURE — G8417 CALC BMI ABV UP PARAM F/U: HCPCS | Performed by: NURSE PRACTITIONER

## 2024-08-22 RX ORDER — PHENTERMINE HYDROCHLORIDE 37.5 MG/1
TABLET ORAL
Qty: 30 TABLET | Refills: 0 | Status: SHIPPED | OUTPATIENT
Start: 2024-08-22 | End: 2024-09-21

## 2024-08-22 SDOH — ECONOMIC STABILITY: FOOD INSECURITY: WITHIN THE PAST 12 MONTHS, YOU WORRIED THAT YOUR FOOD WOULD RUN OUT BEFORE YOU GOT MONEY TO BUY MORE.: NEVER TRUE

## 2024-08-22 SDOH — ECONOMIC STABILITY: FOOD INSECURITY: WITHIN THE PAST 12 MONTHS, THE FOOD YOU BOUGHT JUST DIDN'T LAST AND YOU DIDN'T HAVE MONEY TO GET MORE.: NEVER TRUE

## 2024-08-22 SDOH — ECONOMIC STABILITY: INCOME INSECURITY: HOW HARD IS IT FOR YOU TO PAY FOR THE VERY BASICS LIKE FOOD, HOUSING, MEDICAL CARE, AND HEATING?: NOT HARD AT ALL

## 2024-08-22 ASSESSMENT — ENCOUNTER SYMPTOMS
DIARRHEA: 0
EYE PAIN: 0
ABDOMINAL PAIN: 0
CHEST TIGHTNESS: 0
SINUS PAIN: 0
CONSTIPATION: 0
BACK PAIN: 0
COLOR CHANGE: 0
VOMITING: 0
SINUS PRESSURE: 0
NAUSEA: 0
SHORTNESS OF BREATH: 0

## 2024-08-22 NOTE — PROGRESS NOTES
lb)     Temp Readings from Last 3 Encounters:   01/19/24 97.8 °F (36.6 °C) (Temporal)   01/06/23 97.4 °F (36.3 °C)   03/30/21 97.8 °F (36.6 °C)     BP Readings from Last 3 Encounters:   08/22/24 126/80   05/22/24 128/82   02/22/24 130/86     Pulse Readings from Last 3 Encounters:   08/22/24 85   05/22/24 89   02/22/24 68           An electronic signature was used to authenticate this note.    --Jolie Rojas, TORIBIO - NP

## 2024-09-27 DIAGNOSIS — E28.2 PCOS (POLYCYSTIC OVARIAN SYNDROME): ICD-10-CM

## 2024-10-04 ENCOUNTER — PATIENT MESSAGE (OUTPATIENT)
Dept: FAMILY MEDICINE CLINIC | Age: 44
End: 2024-10-04

## 2024-10-04 DIAGNOSIS — F33.41 RECURRENT MAJOR DEPRESSIVE DISORDER, IN PARTIAL REMISSION (HCC): ICD-10-CM

## 2024-10-07 RX ORDER — BUPROPION HYDROCHLORIDE 300 MG/1
300 TABLET ORAL EVERY MORNING
Qty: 90 TABLET | Refills: 1 | Status: SHIPPED | OUTPATIENT
Start: 2024-10-07

## 2024-11-17 DIAGNOSIS — Z30.41 ORAL CONTRACEPTIVE PILL SURVEILLANCE: ICD-10-CM

## 2024-11-17 DIAGNOSIS — E28.2 PCOS (POLYCYSTIC OVARIAN SYNDROME): ICD-10-CM

## 2024-11-18 RX ORDER — NORETHINDRONE ACETATE AND ETHINYL ESTRADIOL 1.5-30(21)
1 KIT ORAL DAILY
Qty: 3 PACKET | Refills: 0 | Status: SHIPPED | OUTPATIENT
Start: 2024-11-18 | End: 2025-11-18

## 2025-01-06 NOTE — PROGRESS NOTES
[facility]  [address]  [address]  [place] [address]  Dept: [phone]      25    Nola Raya     Gyn Annual Exam      CHIEF COMPLAINT:    Chief Complaint   Patient presents with    Annual Exam     Last pap 10/12/21 WNL last mammogram 24 WNL               Blood pressure 124/89, height 1.702 m (5' 7\"), weight 106.4 kg (234 lb 9.6 oz), last menstrual period 2024, not currently breastfeeding.           HPI :     Nola Raya 1980 is a 44 y.o.   who is here for her annual exam.  She is on ocps and would like to continue. She has a hx of pcos and is on metformin and ocps to regulate her cycles. She is no longer on adipex because she timed out but is down a total of 70 pounds.    Last pap 10/12/2021 NILM         She reports still having periods every 28 days, lasting 3-5 days.  Dysmenorrhea- occasionally  Menorrhagia- no  Denies hot flushes or vaginal dryness.   Uses pills for contraception.      Works in a [employer]  kids 18/10/7    _____________________________________________________________________  Past Medical History:   Diagnosis Date    Anxiety     Back pain     Chlamydia     Complication of anesthesia ‘95    Depression     on antidepressant loss of ectopic pregnancy    Drug effect     Sulfa drugs; codeine    Dysthymia 2020    Ectopic pregnancy 2013    Encounter for counseling regarding contraception 02/10/2022    Gall stones     Herpes simplex without mention of complication     History of blood transfusion     transfusion with ectopic pregnancy    Hypertension     No problem since 2015    Infertility, female     Obesity     PCOS (polycystic ovarian syndrome)     PONV (postoperative nausea and vomiting)     Prolonged emergence from general anesthesia     Seasonal allergies     Secondary hypertension     Trauma 2015    Uterine disorder     septation noted, but

## 2025-01-07 ENCOUNTER — HOSPITAL ENCOUNTER (OUTPATIENT)
Age: 45
Setting detail: SPECIMEN
Discharge: HOME | End: 2025-01-07

## 2025-01-07 ENCOUNTER — OFFICE VISIT (OUTPATIENT)
Dept: OBGYN CLINIC | Age: 45
End: 2025-01-07
Payer: COMMERCIAL

## 2025-01-07 ENCOUNTER — PATIENT MESSAGE (OUTPATIENT)
Dept: FAMILY MEDICINE CLINIC | Age: 45
End: 2025-01-07

## 2025-01-07 VITALS
SYSTOLIC BLOOD PRESSURE: 124 MMHG | BODY MASS INDEX: 36.82 KG/M2 | WEIGHT: 234.6 LBS | HEIGHT: 67 IN | DIASTOLIC BLOOD PRESSURE: 89 MMHG

## 2025-01-07 DIAGNOSIS — E28.2 PCOS (POLYCYSTIC OVARIAN SYNDROME): ICD-10-CM

## 2025-01-07 DIAGNOSIS — Z12.31 ENCOUNTER FOR SCREENING MAMMOGRAM FOR BREAST CANCER: ICD-10-CM

## 2025-01-07 DIAGNOSIS — Z30.41 ORAL CONTRACEPTIVE PILL SURVEILLANCE: ICD-10-CM

## 2025-01-07 DIAGNOSIS — Z01.419 ENCOUNTER FOR GYNECOLOGICAL EXAMINATION: Primary | ICD-10-CM

## 2025-01-07 PROCEDURE — 99396 PREV VISIT EST AGE 40-64: CPT | Performed by: NURSE PRACTITIONER

## 2025-01-07 RX ORDER — NORETHINDRONE ACETATE AND ETHINYL ESTRADIOL 1.5-30(21)
1 KIT ORAL DAILY
Qty: 3 PACKET | Refills: 3 | Status: SHIPPED | OUTPATIENT
Start: 2025-01-07 | End: 2026-01-07

## 2025-01-07 ASSESSMENT — ENCOUNTER SYMPTOMS
GASTROINTESTINAL NEGATIVE: 1
ALLERGIC/IMMUNOLOGIC NEGATIVE: 1
COUGH: 0
SHORTNESS OF BREATH: 0
ABDOMINAL DISTENTION: 0
RESPIRATORY NEGATIVE: 1
BACK PAIN: 0

## 2025-01-09 LAB
HPV I/H RISK 4 DNA CVX QL NAA+PROBE: NOT DETECTED
HPV SAMPLE: NORMAL
HPV, INTERPRETATION: NORMAL
HPV16 DNA CVX QL NAA+PROBE: NOT DETECTED
HPV18 DNA CVX QL NAA+PROBE: NOT DETECTED
SPECIMEN DESCRIPTION: NORMAL

## 2025-01-15 LAB — CYTOLOGY REPORT: NORMAL

## 2025-02-17 DIAGNOSIS — E28.2 PCOS (POLYCYSTIC OVARIAN SYNDROME): ICD-10-CM

## 2025-02-17 DIAGNOSIS — Z30.41 ORAL CONTRACEPTIVE PILL SURVEILLANCE: ICD-10-CM

## 2025-02-18 RX ORDER — NORETHINDRONE ACETATE AND ETHINYL ESTRADIOL 1.5-30(21)
1 KIT ORAL DAILY
Qty: 3 PACKET | Refills: 3 | OUTPATIENT
Start: 2025-02-18 | End: 2026-02-18

## 2025-05-08 DIAGNOSIS — Z30.41 ORAL CONTRACEPTIVE PILL SURVEILLANCE: ICD-10-CM

## 2025-05-08 DIAGNOSIS — E28.2 PCOS (POLYCYSTIC OVARIAN SYNDROME): ICD-10-CM

## 2025-05-08 RX ORDER — NORETHINDRONE ACETATE AND ETHINYL ESTRADIOL 1.5-30(21)
1 KIT ORAL DAILY
Qty: 3 PACKET | Refills: 3 | Status: SHIPPED | OUTPATIENT
Start: 2025-05-08 | End: 2026-05-08

## 2025-06-08 DIAGNOSIS — F33.41 RECURRENT MAJOR DEPRESSIVE DISORDER, IN PARTIAL REMISSION: ICD-10-CM

## 2025-06-09 NOTE — TELEPHONE ENCOUNTER
Last visit: 8/22/24  Last Med refill: 10/7/24  Does patient have enough medication for 72 hours: No:     Patient needs appt-will send [email or web address] message    Next Visit Date:  Future Appointments   Date Time Provider Department Center   6/13/2025  1:00 PM [facility]MO RM 2 [facility]       Health Maintenance   Topic Date Due    Hepatitis B vaccine (1 of 3 - 19+ 3-dose series) Never done    COVID-19 Vaccine (1 - 2024-25 season) Never done    Depression Monitoring  02/22/2025    Flu vaccine (Season Ended) 08/22/2025 (Originally 8/1/2025)    Breast cancer screen  05/01/2026    DTaP/Tdap/Td vaccine (2 - Td or Tdap) 11/07/2027    Lipids  07/10/2029    Cervical cancer screen  01/07/2030    Hepatitis C screen  Completed    HIV screen  Completed    Hepatitis A vaccine  Aged Out    Hib vaccine  Aged Out    HPV vaccine  Aged Out    Polio vaccine  Aged Out    Meningococcal (ACWY) vaccine  Aged Out    Meningococcal B vaccine  Aged Out    Pneumococcal 0-49 years Vaccine  Aged Out    Varicella vaccine  Discontinued       Hemoglobin A1C (%)   Date Value   07/10/2024 5.2   08/13/2022 5.3   04/03/2021 5.4             ( goal A1C is < 7)   No components found for: \"LABMICR\"  No components found for: \"LDLCHOLESTEROL\", \"LDLCALC\"    (goal LDL is <100)   AST (U/L)   Date Value   07/10/2024 20     ALT (U/L)   Date Value   07/10/2024 20     BUN (mg/dL)   Date Value   07/10/2024 7     BP Readings from Last 3 Encounters:   01/07/25 124/89   08/22/24 126/80   05/22/24 128/82          (goal 120/80)    All Future Testing planned in [facility]  Lab Frequency Next Occurrence   PAP SMEAR Once 02/03/2025   SWETHA WALESKA DIGITAL SCREEN BILATERAL Once 05/01/2025               Patient Active Problem List:     PCOS (polycystic ovarian syndrome)     History of ectopic pregnancy     Near syncope     Morbid obesity with BMI of 45.0-49.9, adult (HCC)     Chronic fatigue     Recurrent major depressive disorder, in full remission     COVID-19 vaccination

## 2025-06-10 RX ORDER — BUPROPION HYDROCHLORIDE 300 MG/1
300 TABLET ORAL EVERY MORNING
Qty: 30 TABLET | Refills: 0 | Status: SHIPPED | OUTPATIENT
Start: 2025-06-10

## 2025-06-13 ENCOUNTER — RESULTS FOLLOW-UP (OUTPATIENT)
Dept: OBGYN CLINIC | Age: 45
End: 2025-06-13

## 2025-06-13 ENCOUNTER — HOSPITAL ENCOUNTER (OUTPATIENT)
Dept: MAMMOGRAPHY | Age: 45
Discharge: HOME | End: 2025-06-15
Payer: COMMERCIAL

## 2025-06-13 VITALS — WEIGHT: 234 LBS | BODY MASS INDEX: 36.73 KG/M2 | HEIGHT: 67 IN

## 2025-06-13 DIAGNOSIS — Z12.31 ENCOUNTER FOR SCREENING MAMMOGRAM FOR BREAST CANCER: ICD-10-CM

## 2025-06-13 PROCEDURE — 77063 BREAST TOMOSYNTHESIS BI: CPT

## 2025-06-25 DIAGNOSIS — E28.2 PCOS (POLYCYSTIC OVARIAN SYNDROME): ICD-10-CM

## 2025-07-30 ENCOUNTER — PATIENT MESSAGE (OUTPATIENT)
Dept: FAMILY MEDICINE CLINIC | Age: 45
End: 2025-07-30

## 2025-07-30 DIAGNOSIS — F33.41 RECURRENT MAJOR DEPRESSIVE DISORDER, IN PARTIAL REMISSION: ICD-10-CM

## 2025-08-01 RX ORDER — BUPROPION HYDROCHLORIDE 300 MG/1
300 TABLET ORAL EVERY MORNING
Qty: 30 TABLET | Refills: 0 | Status: SHIPPED | OUTPATIENT
Start: 2025-08-01

## 2025-08-13 ENCOUNTER — OFFICE VISIT (OUTPATIENT)
Dept: FAMILY MEDICINE CLINIC | Age: 45
End: 2025-08-13
Payer: COMMERCIAL

## 2025-08-13 VITALS
SYSTOLIC BLOOD PRESSURE: 120 MMHG | DIASTOLIC BLOOD PRESSURE: 84 MMHG | HEART RATE: 98 BPM | WEIGHT: 237 LBS | RESPIRATION RATE: 20 BRPM | OXYGEN SATURATION: 98 % | BODY MASS INDEX: 37.2 KG/M2 | HEIGHT: 67 IN

## 2025-08-13 DIAGNOSIS — E66.812 CLASS 2 OBESITY WITHOUT SERIOUS COMORBIDITY WITH BODY MASS INDEX (BMI) OF 37.0 TO 37.9 IN ADULT, UNSPECIFIED OBESITY TYPE: ICD-10-CM

## 2025-08-13 DIAGNOSIS — Z13.1 SCREENING FOR DIABETES MELLITUS: ICD-10-CM

## 2025-08-13 DIAGNOSIS — F33.41 RECURRENT MAJOR DEPRESSIVE DISORDER, IN PARTIAL REMISSION: Primary | ICD-10-CM

## 2025-08-13 DIAGNOSIS — Z13.0 SCREENING FOR DEFICIENCY ANEMIA: ICD-10-CM

## 2025-08-13 PROCEDURE — G8427 DOCREV CUR MEDS BY ELIG CLIN: HCPCS | Performed by: NURSE PRACTITIONER

## 2025-08-13 PROCEDURE — 1036F TOBACCO NON-USER: CPT | Performed by: NURSE PRACTITIONER

## 2025-08-13 PROCEDURE — 99214 OFFICE O/P EST MOD 30 MIN: CPT | Performed by: NURSE PRACTITIONER

## 2025-08-13 PROCEDURE — G8417 CALC BMI ABV UP PARAM F/U: HCPCS | Performed by: NURSE PRACTITIONER

## 2025-08-13 RX ORDER — PHENTERMINE HYDROCHLORIDE 37.5 MG/1
37.5 TABLET ORAL
Qty: 30 TABLET | Refills: 0 | Status: SHIPPED | OUTPATIENT
Start: 2025-08-13 | End: 2025-09-12

## 2025-08-13 RX ORDER — BUPROPION HYDROCHLORIDE 300 MG/1
300 TABLET ORAL EVERY MORNING
Qty: 90 TABLET | Refills: 3 | Status: SHIPPED | OUTPATIENT
Start: 2025-08-13

## 2025-08-13 SDOH — ECONOMIC STABILITY: FOOD INSECURITY: WITHIN THE PAST 12 MONTHS, THE FOOD YOU BOUGHT JUST DIDN'T LAST AND YOU DIDN'T HAVE MONEY TO GET MORE.: PATIENT DECLINED

## 2025-08-13 SDOH — ECONOMIC STABILITY: INCOME INSECURITY: IN THE LAST 12 MONTHS, WAS THERE A TIME WHEN YOU WERE NOT ABLE TO PAY THE MORTGAGE OR RENT ON TIME?: NO

## 2025-08-13 SDOH — ECONOMIC STABILITY: FOOD INSECURITY: WITHIN THE PAST 12 MONTHS, YOU WORRIED THAT YOUR FOOD WOULD RUN OUT BEFORE YOU GOT MONEY TO BUY MORE.: PATIENT DECLINED

## 2025-08-13 ASSESSMENT — PATIENT HEALTH QUESTIONNAIRE - PHQ9
5. POOR APPETITE OR OVEREATING: SEVERAL DAYS
SUM OF ALL RESPONSES TO PHQ QUESTIONS 1-9: 5
SUM OF ALL RESPONSES TO PHQ QUESTIONS 1-9: 5
7. TROUBLE CONCENTRATING ON THINGS, SUCH AS READING THE NEWSPAPER OR WATCHING TELEVISION: NOT AT ALL
9. THOUGHTS THAT YOU WOULD BE BETTER OFF DEAD, OR OF HURTING YOURSELF: NOT AT ALL
10. IF YOU CHECKED OFF ANY PROBLEMS, HOW DIFFICULT HAVE THESE PROBLEMS MADE IT FOR YOU TO DO YOUR WORK, TAKE CARE OF THINGS AT HOME, OR GET ALONG WITH OTHER PEOPLE: NOT DIFFICULT AT ALL
5. POOR APPETITE OR OVEREATING: SEVERAL DAYS
6. FEELING BAD ABOUT YOURSELF - OR THAT YOU ARE A FAILURE OR HAVE LET YOURSELF OR YOUR FAMILY DOWN: NOT AT ALL
1. LITTLE INTEREST OR PLEASURE IN DOING THINGS: NOT AT ALL
7. TROUBLE CONCENTRATING ON THINGS, SUCH AS READING THE NEWSPAPER OR WATCHING TELEVISION: NOT AT ALL
SUM OF ALL RESPONSES TO PHQ QUESTIONS 1-9: 5
SUM OF ALL RESPONSES TO PHQ QUESTIONS 1-9: 5
4. FEELING TIRED OR HAVING LITTLE ENERGY: MORE THAN HALF THE DAYS
2. FEELING DOWN, DEPRESSED OR HOPELESS: SEVERAL DAYS
9. THOUGHTS THAT YOU WOULD BE BETTER OFF DEAD, OR OF HURTING YOURSELF: NOT AT ALL
10. IF YOU CHECKED OFF ANY PROBLEMS, HOW DIFFICULT HAVE THESE PROBLEMS MADE IT FOR YOU TO DO YOUR WORK, TAKE CARE OF THINGS AT HOME, OR GET ALONG WITH OTHER PEOPLE: NOT DIFFICULT AT ALL
8. MOVING OR SPEAKING SO SLOWLY THAT OTHER PEOPLE COULD HAVE NOTICED. OR THE OPPOSITE, BEING SO FIGETY OR RESTLESS THAT YOU HAVE BEEN MOVING AROUND A LOT MORE THAN USUAL: NOT AT ALL
2. FEELING DOWN, DEPRESSED OR HOPELESS: SEVERAL DAYS
6. FEELING BAD ABOUT YOURSELF - OR THAT YOU ARE A FAILURE OR HAVE LET YOURSELF OR YOUR FAMILY DOWN: NOT AT ALL
8. MOVING OR SPEAKING SO SLOWLY THAT OTHER PEOPLE COULD HAVE NOTICED. OR THE OPPOSITE - BEING SO FIDGETY OR RESTLESS THAT YOU HAVE BEEN MOVING AROUND A LOT MORE THAN USUAL: NOT AT ALL
4. FEELING TIRED OR HAVING LITTLE ENERGY: MORE THAN HALF THE DAYS
3. TROUBLE FALLING OR STAYING ASLEEP: SEVERAL DAYS
SUM OF ALL RESPONSES TO PHQ QUESTIONS 1-9: 5
3. TROUBLE FALLING OR STAYING ASLEEP: SEVERAL DAYS
1. LITTLE INTEREST OR PLEASURE IN DOING THINGS: NOT AT ALL

## 2025-08-15 ASSESSMENT — ENCOUNTER SYMPTOMS
CONSTIPATION: 0
CHEST TIGHTNESS: 0
SINUS PRESSURE: 0
NAUSEA: 0
COLOR CHANGE: 0
EYE PAIN: 0
ABDOMINAL PAIN: 0
VOMITING: 0
SINUS PAIN: 0
BACK PAIN: 1
SHORTNESS OF BREATH: 0
DIARRHEA: 0

## 2025-08-20 ENCOUNTER — HOSPITAL ENCOUNTER (OUTPATIENT)
Dept: LAB | Age: 45
Discharge: HOME | End: 2025-08-20
Payer: COMMERCIAL

## 2025-08-20 DIAGNOSIS — Z13.1 SCREENING FOR DIABETES MELLITUS: ICD-10-CM

## 2025-08-20 DIAGNOSIS — E66.812 CLASS 2 OBESITY WITHOUT SERIOUS COMORBIDITY WITH BODY MASS INDEX (BMI) OF 37.0 TO 37.9 IN ADULT, UNSPECIFIED OBESITY TYPE: ICD-10-CM

## 2025-08-20 DIAGNOSIS — Z13.0 SCREENING FOR DEFICIENCY ANEMIA: ICD-10-CM

## 2025-08-20 LAB
ALBUMIN SERPL-MCNC: 4 G/DL (ref 3.5–5.2)
ALBUMIN/GLOB SERPL: 1.3 {RATIO} (ref 1–2.5)
ALP SERPL-CCNC: 48 U/L (ref 35–104)
ALT SERPL-CCNC: 18 U/L (ref 10–35)
ANION GAP SERPL CALCULATED.3IONS-SCNC: 11 MMOL/L (ref 9–16)
AST SERPL-CCNC: 21 U/L (ref 10–35)
BILIRUB SERPL-MCNC: 0.5 MG/DL (ref 0–1.2)
BUN SERPL-MCNC: 8 MG/DL (ref 6–20)
CALCIUM SERPL-MCNC: 9.2 MG/DL (ref 8.6–10.4)
CHLORIDE SERPL-SCNC: 101 MMOL/L (ref 98–107)
CHOLEST SERPL-MCNC: 176 MG/DL (ref 0–199)
CHOLESTEROL/HDL RATIO: 3.2
CO2 SERPL-SCNC: 25 MMOL/L (ref 20–31)
CREAT SERPL-MCNC: 0.8 MG/DL (ref 0.6–0.9)
ERYTHROCYTE [DISTWIDTH] IN BLOOD BY AUTOMATED COUNT: 12.5 % (ref 11.8–14.4)
GFR, ESTIMATED: >90 ML/MIN/1.73M2
GLUCOSE SERPL-MCNC: 79 MG/DL (ref 74–99)
HCT VFR BLD AUTO: 41.1 % (ref 36.3–47.1)
HDLC SERPL-MCNC: 55 MG/DL
HGB BLD-MCNC: 13.8 G/DL (ref 11.9–15.1)
LDLC SERPL CALC-MCNC: 100 MG/DL (ref 0–100)
MCH RBC QN AUTO: 30.1 PG (ref 25.2–33.5)
MCHC RBC AUTO-ENTMCNC: 33.6 G/DL (ref 28.4–34.8)
MCV RBC AUTO: 89.5 FL (ref 82.6–102.9)
NRBC BLD-RTO: 0 PER 100 WBC
PLATELET # BLD AUTO: 379 K/UL (ref 138–453)
PMV BLD AUTO: 8.6 FL (ref 8.1–13.5)
POTASSIUM SERPL-SCNC: 3.8 MMOL/L (ref 3.7–5.3)
PROT SERPL-MCNC: 7 G/DL (ref 6.6–8.7)
RBC # BLD AUTO: 4.59 M/UL (ref 3.95–5.11)
SODIUM SERPL-SCNC: 137 MMOL/L (ref 136–145)
TRIGL SERPL-MCNC: 107 MG/DL
TSH SERPL DL<=0.05 MIU/L-ACNC: 2.55 UIU/ML (ref 0.27–4.2)
VLDLC SERPL CALC-MCNC: 21 MG/DL (ref 1–30)
WBC OTHER # BLD: 7.7 K/UL (ref 3.5–11.3)

## 2025-08-20 PROCEDURE — 80061 LIPID PANEL: CPT

## 2025-08-20 PROCEDURE — 36415 COLL VENOUS BLD VENIPUNCTURE: CPT

## 2025-08-20 PROCEDURE — 80053 COMPREHEN METABOLIC PANEL: CPT

## 2025-08-20 PROCEDURE — 85027 COMPLETE CBC AUTOMATED: CPT

## 2025-08-20 PROCEDURE — 84443 ASSAY THYROID STIM HORMONE: CPT

## (undated) DEVICE — DEVICE TRCR 12X9X3IN WHT CLSR DISP OMNICLOSE

## (undated) DEVICE — BLADELESS OBTURATOR: Brand: WECK VISTA

## (undated) DEVICE — MHPB BASIC LAP PACK: Brand: MEDLINE INDUSTRIES, INC.

## (undated) DEVICE — CONTAINER,SPECIMEN,4OZ,OR STRL: Brand: MEDLINE

## (undated) DEVICE — SOLUTION ANTIFOG VIS SYS CLEARIFY LAPSCP

## (undated) DEVICE — GLOVE SURG SZ 65 L12IN THK75MIL DK GRN LTX FREE

## (undated) DEVICE — STRAP,POSITIONING,KNEE/BODY,FOAM,4X60": Brand: MEDLINE

## (undated) DEVICE — SUTURE MCRYL + SZ 4-0 L27IN ABSRB UD L19MM PS-2 3/8 CIR MCP426H

## (undated) DEVICE — GLOVE ORANGE PI 7 1/2   MSG9075

## (undated) DEVICE — PLUMEPORT ACTIV LAPAROSCOPIC SMOKE FILTRATION DEVICE: Brand: PLUMEPORT ACTIVE

## (undated) DEVICE — TROCAR: Brand: KII FIOS FIRST ENTRY

## (undated) DEVICE — SEAL

## (undated) DEVICE — PAD PT POS 36 IN SURGYPAD DISP

## (undated) DEVICE — LIQUIBAND RAPID ADHESIVE 36/CS 0.8ML: Brand: MEDLINE

## (undated) DEVICE — STERILE POLYISOPRENE POWDER-FREE SURGICAL GLOVES WITH EMOLLIENT COATING: Brand: PROTEXIS

## (undated) DEVICE — SUTURE VCRL + SZ 0 L27IN ABSRB VLT L26MM UR-6 5/8 CIR VCP603H

## (undated) DEVICE — APPLICATOR MEDICATED 26 CC SOLUTION HI LT ORNG CHLORAPREP

## (undated) DEVICE — BLANKET WRM W29.9XL79.1IN UP BODY FORC AIR MISTRAL-AIR

## (undated) DEVICE — GLOVE ORANGE PI 8   MSG9080

## (undated) DEVICE — GLOVE SURG SZ 8 L12IN THK75MIL DK GRN LTX FREE

## (undated) DEVICE — GOWN,AURORA,NONREINFORCED,LARGE: Brand: MEDLINE

## (undated) DEVICE — ARM DRAPE

## (undated) DEVICE — ELECTRODE PT RET AD L9FT HI MOIST COND ADH HYDRGEL CORDED

## (undated) DEVICE — SOLUTION IRRIG 1000ML 0.9% SOD CHL USP POUR PLAS BTL

## (undated) DEVICE — GLOVE SURG SZ 65 THK91MIL LTX FREE SYN POLYISOPRENE

## (undated) DEVICE — INSUFFLATION NEEDLE TO ESTABLISH PNEUMOPERITONEUM.: Brand: INSUFFLATION NEEDLE

## (undated) DEVICE — GOWN,SIRUS,NONRNF,XLN/XL,20/CS: Brand: MEDLINE

## (undated) DEVICE — ANCHOR TISSUE RETRIEVAL SYSTEM, BAG SIZE 125 ML, PORT SIZE 8 MM: Brand: ANCHOR TISSUE RETRIEVAL SYSTEM